# Patient Record
Sex: MALE | Race: WHITE | Employment: OTHER | URBAN - METROPOLITAN AREA
[De-identification: names, ages, dates, MRNs, and addresses within clinical notes are randomized per-mention and may not be internally consistent; named-entity substitution may affect disease eponyms.]

---

## 2018-01-22 ENCOUNTER — TELEPHONE (OUTPATIENT)
Dept: INTERNAL MEDICINE CLINIC | Age: 63
End: 2018-01-22

## 2018-01-25 ENCOUNTER — OFFICE VISIT (OUTPATIENT)
Dept: INTERNAL MEDICINE CLINIC | Age: 63
End: 2018-01-25

## 2018-01-25 VITALS
OXYGEN SATURATION: 97 % | TEMPERATURE: 97.8 F | HEIGHT: 71 IN | HEART RATE: 75 BPM | BODY MASS INDEX: 40.38 KG/M2 | RESPIRATION RATE: 16 BRPM | SYSTOLIC BLOOD PRESSURE: 128 MMHG | DIASTOLIC BLOOD PRESSURE: 74 MMHG | WEIGHT: 288.4 LBS

## 2018-01-25 DIAGNOSIS — J40 BRONCHITIS: Primary | ICD-10-CM

## 2018-01-25 PROCEDURE — 99213 OFFICE O/P EST LOW 20 MIN: CPT | Performed by: INTERNAL MEDICINE

## 2018-01-25 RX ORDER — AZITHROMYCIN 250 MG/1
TABLET, FILM COATED ORAL
Qty: 1 PACKET | Refills: 0 | Status: SHIPPED | OUTPATIENT
Start: 2018-01-25 | End: 2018-02-04

## 2018-01-25 RX ORDER — LORATADINE 10 MG/1
10 TABLET ORAL DAILY
Qty: 30 TABLET | Refills: 0 | Status: SHIPPED | OUTPATIENT
Start: 2018-01-25 | End: 2021-04-08 | Stop reason: ALTCHOICE

## 2018-01-25 ASSESSMENT — PATIENT HEALTH QUESTIONNAIRE - PHQ9
SUM OF ALL RESPONSES TO PHQ9 QUESTIONS 1 & 2: 0
SUM OF ALL RESPONSES TO PHQ QUESTIONS 1-9: 0
1. LITTLE INTEREST OR PLEASURE IN DOING THINGS: 0
2. FEELING DOWN, DEPRESSED OR HOPELESS: 0

## 2018-01-25 NOTE — PROGRESS NOTES
negative    Objective  Physical Examination:    Nursing note reviewed    /74 (Site: Right Arm, Position: Sitting, Cuff Size: Large Adult)   Pulse 75   Temp 97.8 °F (36.6 °C) (Oral)   Resp 16   Ht 5' 10.98\" (1.803 m)   Wt 288 lb 6.4 oz (130.8 kg)   SpO2 97%   BMI 40.24 kg/m²   BP Readings from Last 3 Encounters:   01/25/18 128/74   11/04/13 140/84         Constitutional:  Stephanie Link is oriented to place, person and time ,appears well-developed and well-nourished  HEENT:  Atraumatic and normocephalic, external ears normal bilaterally, nose normal no oropharyngeal exudate and is clear and moist  Eyes:  EOCM normal; conjunctivae normal; PERRLA bilaterally  Neck:  Normal range of motion, neck supple, no JVD and no thyromegaly  Cardiovascular:  RRR, normal heart sounds and intact distal pulses  Pulmonary:  effort normal and breath sounds normal bilaterally,no wheezes or rales, no respiratory distress  Abdominal:  Soft, non-tender; normal bowel sounds, no masses  Musculoskeletal:  Normal range of motion and no edema or tenderness bilaterally  No lymphadenopathy  Neurological:  alert, oriented, and normal reflexes bilaterally  Skin: warm and dry  Psychiatric:  normal mood and effect; behavior normal.    Labs:   No results found for: LABA1C  No results found for: CHOL  No results found for: HDL  No results found for: LDLCALC  No results found for: TRIG  No components found for: CHOLHDL  No results found for: WBC, HGB, HCT, MCV, PLT  No results found for: INR, PROTIME  No results found for: GLUCOSE, CREATININE, BUN, NA, K, CL, CO2  No results found for: ALT, AST, GGT, ALKPHOS, BILITOT  No results found for: LABPROT, LABALBU  No results found for: TSH, CBC  Assessment:  1. Bronchitis        Plan:  Z-Stuart for 5 days  Claritin 10 mg daily  Call back next week if no improvement           1. Stephanie Likn received counseling on the following healthy behaviors: nutrition and exercise    2.  Prior labs and health maintenance reviewed. 3.  Discussed use, benefit, and side effects of prescribed medications. Barriers to medication compliance addressed. All his questions were answered. Pt voiced understanding. Jaci Cates will continue current medications, diet and exercise. No orders of the defined types were placed in this encounter. Completed Refills               Requested Prescriptions      No prescriptions requested or ordered in this encounter     4. Patient given educational materials - see patient instructions    5. Was a self-tracking handout given in paper form or via [employer]? NO    No orders of the defined types were placed in this encounter. No Follow-up on file. Patient voiced understanding and agreed to treatment plan. Electronically signed by Luba Novak MD on 1/25/2018 at 12:01 PM    This note is created with a voice recognition program and while intend to generate a document that accurately reflects the content of the visit, no guarantee can be provided that every mistake has been identified and corrected by editing.

## 2018-02-07 ENCOUNTER — TELEPHONE (OUTPATIENT)
Dept: INTERNAL MEDICINE CLINIC | Age: 63
End: 2018-02-07

## 2018-02-07 DIAGNOSIS — N39.0 URINARY TRACT INFECTION WITHOUT HEMATURIA, SITE UNSPECIFIED: Primary | ICD-10-CM

## 2018-02-07 RX ORDER — LEVOFLOXACIN 500 MG/1
500 TABLET, FILM COATED ORAL DAILY
Qty: 10 TABLET | Refills: 0 | Status: SHIPPED | OUTPATIENT
Start: 2018-02-07 | End: 2018-02-17

## 2018-02-07 NOTE — TELEPHONE ENCOUNTER
Next Visit Date:  No future appointments. Health Maintenance   Topic Date Due    DTaP/Tdap/Td vaccine (1 - Tdap) 11/02/1975    Lipid screen  11/02/1996    Diabetes screen  11/02/1996    Colon cancer screen colonoscopy  11/02/2006    Flu vaccine (1) 09/01/2017    HIV screen  02/22/2018 (Originally 11/2/1970)    Zostavax vaccine  03/21/2018 (Originally 11/2/2016)    Hepatitis C screen  03/22/2018 (Originally 1955)       No results found for: LABA1C          ( goal A1C is < 7)   No results found for: LABMICR  No results found for: LDLCHOLESTEROL, LDLCALC    (goal LDL is <100)   No results found for: AST, ALT, BUN  BP Readings from Last 3 Encounters:   01/25/18 128/74   11/04/13 140/84          (goal 120/80)    All Future Testing planned in [facility]              There is no problem list on file for this patient.

## 2021-04-08 ENCOUNTER — HOSPITAL ENCOUNTER (OUTPATIENT)
Age: 66
Setting detail: SPECIMEN
Discharge: HOME | End: 2021-04-08
Payer: COMMERCIAL

## 2021-04-08 ENCOUNTER — OFFICE VISIT (OUTPATIENT)
Dept: INTERNAL MEDICINE CLINIC | Age: 66
End: 2021-04-08
Payer: COMMERCIAL

## 2021-04-08 VITALS
SYSTOLIC BLOOD PRESSURE: 156 MMHG | BODY MASS INDEX: 43.4 KG/M2 | DIASTOLIC BLOOD PRESSURE: 84 MMHG | WEIGHT: 310 LBS | TEMPERATURE: 98.3 F | RESPIRATION RATE: 18 BRPM | HEART RATE: 80 BPM | HEIGHT: 71 IN

## 2021-04-08 DIAGNOSIS — G89.29 CHRONIC PAIN OF BOTH KNEES: ICD-10-CM

## 2021-04-08 DIAGNOSIS — R60.0 LOWER EXTREMITY EDEMA: ICD-10-CM

## 2021-04-08 DIAGNOSIS — I10 ESSENTIAL HYPERTENSION: Primary | ICD-10-CM

## 2021-04-08 DIAGNOSIS — E66.01 MORBID OBESITY WITH BMI OF 40.0-44.9, ADULT (HCC): ICD-10-CM

## 2021-04-08 DIAGNOSIS — M25.562 CHRONIC PAIN OF BOTH KNEES: ICD-10-CM

## 2021-04-08 DIAGNOSIS — M25.561 CHRONIC PAIN OF BOTH KNEES: ICD-10-CM

## 2021-04-08 DIAGNOSIS — I10 ESSENTIAL HYPERTENSION: ICD-10-CM

## 2021-04-08 LAB
ALBUMIN SERPL-MCNC: 4.4 G/DL (ref 3.5–5.2)
ALBUMIN/GLOBULIN RATIO: 1.6 (ref 1–2.5)
ALP BLD-CCNC: 109 U/L (ref 40–129)
ALT SERPL-CCNC: 28 U/L (ref 5–41)
ANION GAP SERPL CALCULATED.3IONS-SCNC: 9 MMOL/L (ref 9–17)
AST SERPL-CCNC: 28 U/L
BILIRUB SERPL-MCNC: 0.43 MG/DL (ref 0.3–1.2)
BUN BLDV-MCNC: 10 MG/DL (ref 8–23)
BUN/CREAT BLD: NORMAL (ref 9–20)
CALCIUM SERPL-MCNC: 9 MG/DL (ref 8.6–10.4)
CHLORIDE BLD-SCNC: 104 MMOL/L (ref 98–107)
CHOLESTEROL/HDL RATIO: 7.4
CHOLESTEROL: 216 MG/DL
CO2: 26 MMOL/L (ref 20–31)
CREAT SERPL-MCNC: 0.8 MG/DL (ref 0.7–1.2)
GFR AFRICAN AMERICAN: >60 ML/MIN
GFR NON-AFRICAN AMERICAN: >60 ML/MIN
GFR SERPL CREATININE-BSD FRML MDRD: NORMAL ML/MIN/{1.73_M2}
GFR SERPL CREATININE-BSD FRML MDRD: NORMAL ML/MIN/{1.73_M2}
GLUCOSE BLD-MCNC: 95 MG/DL (ref 70–99)
HCT VFR BLD CALC: 49.6 % (ref 40.7–50.3)
HDLC SERPL-MCNC: 29 MG/DL
HEMOGLOBIN: 16.2 G/DL (ref 13–17)
LDL CHOLESTEROL DIRECT: 41 MG/DL
LDL CHOLESTEROL: ABNORMAL MG/DL (ref 0–130)
MAGNESIUM: 2.5 MG/DL (ref 1.6–2.6)
MCH RBC QN AUTO: 29.8 PG (ref 25.2–33.5)
MCHC RBC AUTO-ENTMCNC: 32.7 G/DL (ref 28.4–34.8)
MCV RBC AUTO: 91.3 FL (ref 82.6–102.9)
NRBC AUTOMATED: 0 PER 100 WBC
PDW BLD-RTO: 12.3 % (ref 11.8–14.4)
PLATELET # BLD: 233 K/UL (ref 138–453)
PMV BLD AUTO: 11.5 FL (ref 8.1–13.5)
POTASSIUM SERPL-SCNC: 3.9 MMOL/L (ref 3.7–5.3)
PROSTATE SPECIFIC ANTIGEN: 4.52 UG/L
RBC # BLD: 5.43 M/UL (ref 4.21–5.77)
SODIUM BLD-SCNC: 139 MMOL/L (ref 135–144)
TOTAL PROTEIN: 7.2 G/DL (ref 6.4–8.3)
TRIGL SERPL-MCNC: 810 MG/DL
TSH SERPL DL<=0.05 MIU/L-ACNC: 1.15 MIU/L (ref 0.3–5)
VITAMIN B-12: 326 PG/ML (ref 232–1245)
VLDLC SERPL CALC-MCNC: ABNORMAL MG/DL (ref 1–30)
WBC # BLD: 7.4 K/UL (ref 3.5–11.3)

## 2021-04-08 PROCEDURE — 99215 OFFICE O/P EST HI 40 MIN: CPT | Performed by: INTERNAL MEDICINE

## 2021-04-08 RX ORDER — HYDROCHLOROTHIAZIDE 25 MG/1
25 TABLET ORAL EVERY MORNING
Qty: 30 TABLET | Refills: 0 | Status: SHIPPED | OUTPATIENT
Start: 2021-04-08 | End: 2021-05-05

## 2021-04-08 SDOH — ECONOMIC STABILITY: FOOD INSECURITY: WITHIN THE PAST 12 MONTHS, YOU WORRIED THAT YOUR FOOD WOULD RUN OUT BEFORE YOU GOT MONEY TO BUY MORE.: NEVER TRUE

## 2021-04-08 SDOH — ECONOMIC STABILITY: INCOME INSECURITY: HOW HARD IS IT FOR YOU TO PAY FOR THE VERY BASICS LIKE FOOD, HOUSING, MEDICAL CARE, AND HEATING?: NOT HARD AT ALL

## 2021-04-08 SDOH — ECONOMIC STABILITY: FOOD INSECURITY: WITHIN THE PAST 12 MONTHS, THE FOOD YOU BOUGHT JUST DIDN'T LAST AND YOU DIDN'T HAVE MONEY TO GET MORE.: NEVER TRUE

## 2021-04-08 ASSESSMENT — PATIENT HEALTH QUESTIONNAIRE - PHQ9
SUM OF ALL RESPONSES TO PHQ QUESTIONS 1-9: 0
1. LITTLE INTEREST OR PLEASURE IN DOING THINGS: 0
SUM OF ALL RESPONSES TO PHQ QUESTIONS 1-9: 0

## 2021-04-08 NOTE — PROGRESS NOTES
Judah Wilkerson is a 60 y.o. male who presents for   Chief Complaint   Patient presents with    Knee Pain     bilat knee pain    Back Pain    Other     co legs feeling very heavy    and follow up of chronic medical problems. There is no problem list on file for this patient. HPI  Here to reestablish patient in complaining of leg swelling and gained weight and gained about 22 pounds since the last visit in 2018 and complains of legs feeling heavy otherwise denies any new complaints    Current Outpatient Medications   Medication Sig Dispense Refill    hydroCHLOROthiazide (HYDRODIURIL) 25 MG tablet Take 1 tablet by mouth every morning 30 tablet 0    Cyanocobalamin (VITAMIN B 12 PO) Take 1 tablet by mouth      VITAMIN D-3 (COLECALCIFEROL) 400 UNITS TABS Take  by mouth daily. No current facility-administered medications for this visit. Allergies   Allergen Reactions    Pcn [Penicillins]        No past medical history on file. No past surgical history on file. No family history on file.   ROS   Constitutional:  Negative for fatigue, loss of appetite and unexpected weight change   HEENT            : Negative for neck stiffness and pain, no congestion or sinus pressure   Eyes                : No visual disturbance or pain   Cardiovascular: No chest pain or palpitations or leg swelling   Respiratory      : Negative for cough, shortness of breath or wheezing   Gastrointestinal: Negative for abdominal pain, constipation or diarrhea and bloating No nausea or vomiting   Genitourinary:     No urgency or frequency, no burning or hematuria   Musculoskeletal: No arthralgias, back pain or myalgias   Skin                  : Negative for rash or erythema   Neurological    : Negative for dizziness, weakness, tremors ,light headedness or syncope   Psychiatric       : Negative for dysphoric mood, sleep disturbances, nervous or anxious, or decreased concentration   All other review of systems was negative    Objective  Physical Examination:    Nursing note reviewed    BP (!) 156/84   Pulse 80   Temp 98.3 °F (36.8 °C) (Infrared)   Resp 18   Ht 5' 11\" (1.803 m)   Wt (!) 310 lb (140.6 kg)   BMI 43.24 kg/m²   BP Readings from Last 3 Encounters:   04/08/21 (!) 156/84   01/25/18 128/74   11/04/13 140/84         Constitutional:  Jenna Pritchett is oriented to place, person and time ,appears well-developed and well-nourished  HEENT:  Atraumatic and normocephalic, external ears normal bilaterally, nose normal no oropharyngeal exudate and is clear and moist  Eyes:  EOCM normal; conjunctivae normal; PERRLA bilaterally  Neck:  Normal range of motion, neck supple, no JVD and no thyromegaly  Cardiovascular:  RRR, normal heart sounds and intact distal pulses  Pulmonary:  effort normal and breath sounds normal bilaterally,no wheezes or rales, no respiratory distress  Abdominal:  Soft, non-tender; normal bowel sounds, no masses  Musculoskeletal:  Normal range of motion and 1+ edema edema or tenderness bilaterally  No lymphadenopathy  Neurological:  alert, oriented, and normal reflexes bilaterally  Skin: warm and dry  Psychiatric:  normal mood and effect; behavior normal.    Labs:   No results found for: LABA1C  No results found for: CHOL  No results found for: HDL  No results found for: LDLCALC  No results found for: TRIG  No components found for: CHOLHDL  No results found for: WBC, HGB, HCT, MCV, PLT  No results found for: INR, PROTIME  No results found for: GLUCOSE, CREATININE, BUN, NA, K, CL, CO2  No results found for: ALT, AST, GGT, ALKPHOS, BILITOT  No results found for: LABPROT, LABALBU  No results found for: TSH, CBC  Assessment:  1. Essential hypertension    2. Lower extremity edema    3. Chronic pain of both knees    4.  Morbid obesity with BMI of 40.0-44.9, adult ([facility] [address]        Plan:  Patient had elevated blood pressure and also 1+ edema in the legs and so patient is started on hydrochlorothiazide 25 mg daily and advised to monitor blood pressure  Labs ordered to check for CBC CMP TSH and FLP  Bilateral knee x-rays ordered as patient complains of pain  Strongly counseled about diet exercise and weight loss  Review in 3 weeks           1. Raheem Cevallos received counseling on the following healthy behaviors: nutrition and exercise    2. Prior labs and health maintenance reviewed. 3.  Discussed use, benefit, and side effects of prescribed medications. Barriers to medication compliance addressed. All his questions were answered. Pt voiced understanding. Raheem Cevallos will continue current medications, diet and exercise. Orders Placed This Encounter   Medications    hydroCHLOROthiazide (HYDRODIURIL) 25 MG tablet     Sig: Take 1 tablet by mouth every morning     Dispense:  30 tablet     Refill:  0          Completed Refills               Requested Prescriptions     Signed Prescriptions Disp Refills    hydroCHLOROthiazide (HYDRODIURIL) 25 MG tablet 30 tablet 0     Sig: Take 1 tablet by mouth every morning     4. Patient given educational materials - see patient instructions    5. Was a self-tracking handout given in paper form or via [employer]?   NO    Orders Placed This Encounter   Procedures    XR Knee Bilateral 3 VW     Standing Status:   Future     Standing Expiration Date:   4/8/2022    Comprehensive Metabolic Panel     Standing Status:   Future     Standing Expiration Date:   4/8/2022    Lipid Panel     Standing Status:   Future     Standing Expiration Date:   4/8/2022     Order Specific Question:   Is Patient Fasting?/# of Hours     Answer:   12    TSH without Reflex     Standing Status:   Future     Standing Expiration Date:   4/8/2022    CBC     Standing Status:   Future     Standing Expiration Date:   4/8/2022    PSA screening     Standing Status:   Future     Standing Expiration Date:   4/8/2022    Magnesium     Standing Status:   Future     Standing Expiration Date:   4/8/2022    Vitamin B12 Standing Status:   Future     Standing Expiration Date:   4/8/2022     Return in about 3 weeks (around 4/29/2021). Patient voiced understanding and agreed to treatment plan. Electronically signed by Nigel Lindquist MD on 4/8/2021 at 3:15 PM    This note is created with a voice recognition program and while intend to generate a document that accurately reflects the content of the visit, no guarantee can be provided that every mistake has been identified and corrected by editing.

## 2021-04-13 ENCOUNTER — TELEPHONE (OUTPATIENT)
Dept: INTERNAL MEDICINE CLINIC | Age: 66
End: 2021-04-13

## 2021-04-13 DIAGNOSIS — R97.20 ELEVATED PSA: Primary | ICD-10-CM

## 2021-04-13 DIAGNOSIS — I10 ESSENTIAL HYPERTENSION: ICD-10-CM

## 2021-04-13 RX ORDER — FENOFIBRATE 145 MG/1
145 TABLET, COATED ORAL DAILY
Qty: 30 TABLET | Refills: 3 | Status: SHIPPED | OUTPATIENT
Start: 2021-04-13 | End: 2021-06-29 | Stop reason: SDUPTHER

## 2021-04-13 NOTE — TELEPHONE ENCOUNTER
----- Message from Steph Tamayo MD sent at 4/12/2021  1:13 PM EDT -----  Patient's PSA is elevated and advise to see urology and please make a referral  Also start on fenofibrate 145 mg daily for high triglycerides

## 2021-04-29 ENCOUNTER — OFFICE VISIT (OUTPATIENT)
Dept: INTERNAL MEDICINE CLINIC | Age: 66
End: 2021-04-29
Payer: COMMERCIAL

## 2021-04-29 VITALS
SYSTOLIC BLOOD PRESSURE: 134 MMHG | WEIGHT: 310 LBS | HEIGHT: 71 IN | TEMPERATURE: 98.2 F | BODY MASS INDEX: 43.4 KG/M2 | HEART RATE: 92 BPM | RESPIRATION RATE: 16 BRPM | DIASTOLIC BLOOD PRESSURE: 72 MMHG

## 2021-04-29 DIAGNOSIS — K92.89 GAS BLOAT SYNDROME: ICD-10-CM

## 2021-04-29 DIAGNOSIS — R60.0 LOWER EXTREMITY EDEMA: ICD-10-CM

## 2021-04-29 DIAGNOSIS — I10 ESSENTIAL HYPERTENSION: Primary | ICD-10-CM

## 2021-04-29 DIAGNOSIS — R97.20 ELEVATED PSA: ICD-10-CM

## 2021-04-29 PROCEDURE — 99214 OFFICE O/P EST MOD 30 MIN: CPT | Performed by: INTERNAL MEDICINE

## 2021-04-29 NOTE — PROGRESS NOTES
Morelia Gonzalez is a 60 y.o. male who presents for   Chief Complaint   Patient presents with    Hypertension     3 week check up, did not get home machine yet- labs 4/8/21   Eunice Krishnan     said new meds causing alot of gas    Leg Swelling     swelling of legs much better, feet and toes feel better    and follow up of chronic medical problems. There is no problem list on file for this patient. HPI  Here for follow-up on blood pressure and leg swelling and patient feeling better except developed gas and bloating after starting the medications    Current Outpatient Medications   Medication Sig Dispense Refill    fenofibrate (TRICOR) 145 MG tablet Take 1 tablet by mouth daily 30 tablet 3    Blood Pressure Monitoring MISC Blood pressure cuff for home use 1 each 0    hydroCHLOROthiazide (HYDRODIURIL) 25 MG tablet Take 1 tablet by mouth every morning 30 tablet 0    Cyanocobalamin (VITAMIN B 12 PO) Take 1 tablet by mouth      VITAMIN D-3 (COLECALCIFEROL) 400 UNITS TABS Take  by mouth daily. No current facility-administered medications for this visit. Allergies   Allergen Reactions    Pcn [Penicillins]        No past medical history on file. No past surgical history on file. No family history on file.   ROS   Constitutional:  Negative for fatigue, loss of appetite and unexpected weight change   HEENT            : Negative for neck stiffness and pain, no congestion or sinus pressure   Eyes                : No visual disturbance or pain   Cardiovascular: No chest pain or palpitations or leg swelling   Respiratory      : Negative for cough, shortness of breath or wheezing   Gastrointestinal: Negative for abdominal pain, constipation or diarrhea but positive for bloating No nausea or vomiting   Genitourinary:     No urgency or frequency, no burning or hematuria   Musculoskeletal: No arthralgias, back pain or myalgias   Skin                  : Negative for rash or erythema   Neurological    : Negative for dizziness, weakness, tremors ,light headedness or syncope   Psychiatric       : Negative for dysphoric mood, sleep disturbances, nervous or anxious, or decreased concentration   All other review of systems was negative    Objective  Physical Examination:    Nursing note reviewed    /72 (Site: Right Upper Arm, Position: Sitting, Cuff Size: Large Adult)   Pulse 92   Temp 98.2 °F (36.8 °C) (Infrared)   Resp 16   Ht 5' 11\" (1.803 m)   Wt (!) 310 lb (140.6 kg)   BMI 43.24 kg/m²   BP Readings from Last 3 Encounters:   04/29/21 134/72   04/08/21 (!) 156/84   01/25/18 128/74         Constitutional:  Mai Silva is oriented to place, person and time ,appears well-developed and well-nourished  HEENT:  Atraumatic and normocephalic, external ears normal bilaterally, nose normal no oropharyngeal exudate and is clear and moist  Eyes:  EOCM normal; conjunctivae normal; PERRLA bilaterally  Neck:  Normal range of motion, neck supple, no JVD and no thyromegaly  Cardiovascular:  RRR, normal heart sounds and intact distal pulses  Pulmonary:  effort normal and breath sounds normal bilaterally,no wheezes or rales, no respiratory distress  Abdominal:  Soft, non-tender; normal bowel sounds, no masses  Musculoskeletal:  Normal range of motion and no edema or tenderness bilaterally  No lymphadenopathy  Neurological:  alert, oriented, and normal reflexes bilaterally  Skin: warm and dry  Psychiatric:  normal mood and effect; behavior normal.    Labs:   No results found for: LABA1C  Lab Results   Component Value Date    CHOL 216 (H) 04/08/2021     Lab Results   Component Value Date    HDL 29 (L) 04/08/2021     No results found for: [address]  Lab Results   Component Value Date    TRIG 810 (H) 04/08/2021     No components found for: CHOLHDL  Lab Results   Component Value Date    WBC 7.4 04/08/2021    HGB 16.2 04/08/2021    HCT 49.6 04/08/2021    MCV 91.3 04/08/2021     04/08/2021     No results found for: INR, PROTIME Lab Results   Component Value Date    GLUCOSE 95 04/08/2021    CREATININE 0.80 04/08/2021    BUN 10 04/08/2021     04/08/2021    K 3.9 04/08/2021     04/08/2021    CO2 26 04/08/2021     Lab Results   Component Value Date    ALT 28 04/08/2021    AST 28 04/08/2021    ALKPHOS 109 04/08/2021    BILITOT 0.43 04/08/2021     Lab Results   Component Value Date    LABALBU 4.4 04/08/2021     Lab Results   Component Value Date    TSH 1.15 04/08/2021     Assessment:  1. Essential hypertension    2. Lower extremity edema    3. Elevated PSA    4. Gas bloat syndrome        Plan:  Patient blood pressure has improved and continue hydrochlorothiazide and swelling is resolved in the legs  As patient is complaining of bloating and gas after starting the medication side told him to hold the fenofibrate for 1 week to 10 days and call me back if bloating improved  or not  Patient has seen the urologist for elevated PSA and scheduling biopsy on May 20  Review in 3 months           1. Stella Cardona received counseling on the following healthy behaviors: nutrition and exercise    2. Prior labs and health maintenance reviewed. 3.  Discussed use, benefit, and side effects of prescribed medications. Barriers to medication compliance addressed. All his questions were answered. Pt voiced understanding. Stella Cardona will continue current medications, diet and exercise. No orders of the defined types were placed in this encounter. Completed Refills               Requested Prescriptions      No prescriptions requested or ordered in this encounter     4. Patient given educational materials - see patient instructions    5. Was a self-tracking handout given in paper form or via [employer]? NO    No orders of the defined types were placed in this encounter. Return in about 3 months (around 7/29/2021). Patient voiced understanding and agreed to treatment plan.      Electronically signed by Clyde Astudillo MD on 4/29/2021 at 3:01 PM    This note is created with a voice recognition program and while intend to generate a document that accurately reflects the content of the visit, no guarantee can be provided that every mistake has been identified and corrected by editing.

## 2021-05-05 RX ORDER — HYDROCHLOROTHIAZIDE 25 MG/1
25 TABLET ORAL EVERY MORNING
Qty: 30 TABLET | Refills: 1 | Status: SHIPPED | OUTPATIENT
Start: 2021-05-05 | End: 2021-06-29

## 2021-05-05 NOTE — TELEPHONE ENCOUNTER
Farideh Herndon is calling to request a refill on the following medication(s):    Medication Request:  Requested Prescriptions     Pending Prescriptions Disp Refills    hydroCHLOROthiazide (HYDRODIURIL) 25 MG tablet [Pharmacy Med Name: HYDROCHLOROTHIAZIDE 25MG TABLETS] 30 tablet 0     Sig: TAKE 1 TABLET BY MOUTH EVERY MORNING     Last fill 4/8/21  Last Visit Date (If Applicable):  6/52/8289    Next Visit Date:    7/29/2021

## 2021-05-11 ENCOUNTER — TELEPHONE (OUTPATIENT)
Dept: INTERNAL MEDICINE CLINIC | Age: 66
End: 2021-05-11

## 2021-05-29 ENCOUNTER — HOSPITAL ENCOUNTER (OUTPATIENT)
Dept: LAB | Age: 66
Setting detail: SPECIMEN
Discharge: HOME | End: 2021-05-29
Payer: COMMERCIAL

## 2021-05-29 DIAGNOSIS — Z01.818 PREOP TESTING: Primary | ICD-10-CM

## 2021-05-29 PROCEDURE — U0005 INFEC AGEN DETEC AMPLI PROBE: HCPCS

## 2021-05-29 PROCEDURE — U0003 INFECTIOUS AGENT DETECTION BY NUCLEIC ACID (DNA OR RNA); SEVERE ACUTE RESPIRATORY SYNDROME CORONAVIRUS 2 (SARS-COV-2) (CORONAVIRUS DISEASE [COVID-19]), AMPLIFIED PROBE TECHNIQUE, MAKING USE OF HIGH THROUGHPUT TECHNOLOGIES AS DESCRIBED BY CMS-2020-01-R: HCPCS

## 2021-05-30 LAB
SARS-COV-2: NORMAL
SARS-COV-2: NOT DETECTED
SOURCE: NORMAL

## 2021-06-02 ENCOUNTER — HOSPITAL ENCOUNTER (OUTPATIENT)
Dept: ULTRASOUND IMAGING | Age: 66
Discharge: HOME | End: 2021-06-04
Attending: UROLOGY
Payer: COMMERCIAL

## 2021-06-02 ENCOUNTER — HOSPITAL ENCOUNTER (OUTPATIENT)
Age: 66
Setting detail: OUTPATIENT SURGERY
Discharge: HOME | End: 2021-06-02
Attending: UROLOGY | Admitting: UROLOGY
Payer: COMMERCIAL

## 2021-06-02 VITALS
HEIGHT: 71 IN | DIASTOLIC BLOOD PRESSURE: 91 MMHG | WEIGHT: 295 LBS | BODY MASS INDEX: 41.3 KG/M2 | SYSTOLIC BLOOD PRESSURE: 181 MMHG | RESPIRATION RATE: 18 BRPM | HEART RATE: 85 BPM | TEMPERATURE: 96.8 F | OXYGEN SATURATION: 95 %

## 2021-06-02 PROCEDURE — 2500000003 HC RX 250 WO HCPCS: Performed by: UROLOGY

## 2021-06-02 PROCEDURE — 88305 TISSUE EXAM BY PATHOLOGIST: CPT

## 2021-06-02 PROCEDURE — 88344 IMHCHEM/IMCYTCHM EA MLT ANTB: CPT

## 2021-06-02 PROCEDURE — 3600000012 HC SURGERY LEVEL 2 ADDTL 15MIN: Performed by: UROLOGY

## 2021-06-02 PROCEDURE — 7100000040 HC SPAR PHASE II RECOVERY - FIRST 15 MIN: Performed by: UROLOGY

## 2021-06-02 PROCEDURE — 76942 ECHO GUIDE FOR BIOPSY: CPT

## 2021-06-02 PROCEDURE — 2709999900 HC NON-CHARGEABLE SUPPLY: Performed by: UROLOGY

## 2021-06-02 PROCEDURE — 6370000000 HC RX 637 (ALT 250 FOR IP): Performed by: UROLOGY

## 2021-06-02 PROCEDURE — 3600000002 HC SURGERY LEVEL 2 BASE: Performed by: UROLOGY

## 2021-06-02 RX ORDER — LIDOCAINE HYDROCHLORIDE 20 MG/ML
JELLY TOPICAL PRN
Status: DISCONTINUED | OUTPATIENT
Start: 2021-06-02 | End: 2021-06-02 | Stop reason: ALTCHOICE

## 2021-06-02 RX ORDER — CIPROFLOXACIN 500 MG/1
500 TABLET, FILM COATED ORAL 2 TIMES DAILY
COMMUNITY
End: 2021-07-29

## 2021-06-02 RX ORDER — LIDOCAINE HYDROCHLORIDE 10 MG/ML
INJECTION, SOLUTION EPIDURAL; INFILTRATION; INTRACAUDAL; PERINEURAL PRN
Status: DISCONTINUED | OUTPATIENT
Start: 2021-06-02 | End: 2021-06-02 | Stop reason: ALTCHOICE

## 2021-06-02 ASSESSMENT — PAIN SCALES - GENERAL: PAINLEVEL_OUTOF10: 0

## 2021-06-02 ASSESSMENT — PAIN - FUNCTIONAL ASSESSMENT: PAIN_FUNCTIONAL_ASSESSMENT: 0-10

## 2021-06-02 ASSESSMENT — PAIN DESCRIPTION - PAIN TYPE: TYPE: SURGICAL PAIN

## 2021-06-02 NOTE — PROGRESS NOTES
Pt. Drove self here, nobody to be with at home.   Urology PA here to talk with with about going as local.

## 2021-06-02 NOTE — OP NOTE
Dr. Patito Mendoza MD      [address] [facility]. [place]  06/02/21    Patient:  Radha Morse  MRN: [id number]  YOB: 1955    Surgeon: Dr. Patito Mendoza MD  Assistant: Lynn Rodriguez MD    Pre-op Diagnosis: Elevated PSA. Post-op Diagnosis: Elevated PSA. Procedure:   1. Trans-Rectal Ultrasound Guided Biopsy of the Prostate    Intraoperative Findings:   - Prostate Gland Size: 121cc  - Prostate Lesions: None    Anesthesia: Local  Complications: None  OR Blood Loss: Minimal  Fluids: Cystalloids  Specimens:  ID Type Source Tests Collected by Time Destination   A : PROSTATE BIOPSY X12 Tissue Prostate SURGICAL PATHOLOGY Chilo Rico MD 6/2/2021 1118        Indication:  60year old male with history of elevated PSA who presents for the prostate biopsy. Narrative of the Procedure:    After informed consent was obtain, the patient was taken to the operative suite. He remained on the [facility]. He was rolled onto the side. The legs were brought toward the chest. Lidocaine jelly was used in the rectum. A timeout occurred in which two patient identifiers were used. The rectal ultrasound probe was inserted and volumetric measurements were taken. The prostate volume was 121 grams. The prostate was assessed in its entirety and no hypoechoic or otherwise abnormal lesions were noted. The bilateral neurovascular bundles were located and 1% lidocaine local anesthetic was injected bilaterally for local nerve blocks. Starting at the base of the gland and worked apically, sampling was completed. A standard sextant template was employed bilaterally. A total of 2 cores were taken within each quadrant for a total of 24 biopsies. Once completed, the rectal ultrasound probe was removed. Inspection of the rectum demonstrated no active bleeding. The patient was rolled back into the supine position. He was then discharged back to the PACU in good and stable condition.      Follow-Up: The

## 2021-06-02 NOTE — H&P
Pre-op History and Physical  [address] PA-C    Patient:  Mallorie Win  MRN: [id number]  YOB: 1955    HISTORY OF PRESENT ILLNESS:     The patient is a 60 y.o. male who presents with elevated PSA. Here for prostate biopsy under mac. Patient's old records, notes and chart reviewed and summarized above. [address] Slater PA-C independently reviewed the images and verified the radiology reports from:    No results found. Past Medical History:    Past Medical History:   Diagnosis Date    Hyperlipidemia     Hypertension     PSA elevation        Past Surgical History:    History reviewed. No pertinent surgical history. Medications Prior to Admission:    Prior to Admission medications    Medication Sig Start Date End Date Taking? Authorizing Provider   ciprofloxacin (CIPRO) 500 MG tablet Take 500 mg by mouth 2 times daily   Yes Historical Provider, MD   VITAMIN E PO Take 1 tablet by mouth daily   Yes Historical Provider, MD   hydroCHLOROthiazide (HYDRODIURIL) 25 MG tablet TAKE 1 TABLET BY MOUTH EVERY MORNING 5/5/21  Yes Hank Anderson MD   fenofibrate (TRICOR) 145 MG tablet Take 1 tablet by mouth daily 4/13/21  Yes Hank Anderson MD   Cyanocobalamin (VITAMIN B 12 PO) Take 1 tablet by mouth   Yes Historical Provider, MD   VITAMIN D-3 (COLECALCIFEROL) 400 UNITS TABS Take  by mouth daily.    Yes Historical Provider, MD   Blood Pressure Monitoring MISC Blood pressure cuff for home use 4/13/21   Hank Anderson MD       Allergies:  Pcn [penicillins]    Social History:    Social History     Socioeconomic History    Marital status: Single     Spouse name: Not on file    Number of children: Not on file    Years of education: Not on file    Highest education level: Not on file   Occupational History    Not on file   Tobacco Use    Smoking status: Never Smoker    Smokeless tobacco: Never Used   Substance and Sexual Activity    Alcohol use: No    Drug use: Never    Sexual activity: Not on file   Other Topics Concern    Not on file   Social History Narrative    Not on file     Social Determinants of Health     Financial Resource Strain: Low Risk     Difficulty of Paying Living Expenses: Not hard at all   Food Insecurity: No Food Insecurity    Worried About Running Out of Food in the Last Year: Never true    [address] in the Last Year: Never true   Transportation Needs: No Transportation Needs    Lack of Transportation (Medical): No    Lack of Transportation (Non-Medical): No   Physical Activity:     Days of Exercise per Week:     Minutes of Exercise per Session:    Stress:     Feeling of Stress :    Social Connections:     Frequency of Communication with Friends and Family:     Frequency of Social Gatherings with Friends and Family:     Attends [employer] Services:     Active Member of Clubs or Organizations:     Attends Club or Organization Meetings:     Marital Status:    Intimate Partner Violence:     Fear of Current or Ex-Partner:     Emotionally Abused:     Physically Abused:     Sexually Abused:        Family History:  History reviewed. No pertinent family history. REVIEW OF SYSTEMS:  Constitutional: negative  Eyes: negative  Respiratory: negative  Cardiovascular: negative  Gastrointestinal: negative  Genitourinary: no acute issues  Musculoskeletal: negative  Skin: negative   Neurological: negative  Hematological/Lymphatic: negative  Psychological: negative    PHYSICAL EXAM:    Patient Vitals for the past 24 hrs:   BP Temp Temp src Pulse Resp SpO2 Height Weight   06/02/21 1002 (!) 160/92 97.2 °F (36.2 °C) Temporal 81 20 95 % 5' 11\" (1.803 m) 295 lb (133.8 kg)     Constitutional: Patient in NAD  Neuro: Alert and oriented to person, place, and time  Psych: Mood and affect normal  Skin: Clean, dry, intact   Lungs: Respiratory effort normal, CTA  Cardiovascular:  Normal peripheral pulses; no murmur.  Normal rhythm  Abdomen: Soft, non-tender,

## 2021-06-04 LAB — SURGICAL PATHOLOGY REPORT: NORMAL

## 2021-06-11 ENCOUNTER — OFFICE VISIT (OUTPATIENT)
Dept: INTERNAL MEDICINE CLINIC | Age: 66
End: 2021-06-11
Payer: COMMERCIAL

## 2021-06-11 VITALS
SYSTOLIC BLOOD PRESSURE: 142 MMHG | RESPIRATION RATE: 20 BRPM | DIASTOLIC BLOOD PRESSURE: 80 MMHG | HEART RATE: 80 BPM | HEIGHT: 71 IN | OXYGEN SATURATION: 98 % | BODY MASS INDEX: 41.47 KG/M2 | WEIGHT: 296.2 LBS | TEMPERATURE: 98.1 F

## 2021-06-11 DIAGNOSIS — R97.20 ELEVATED PSA: ICD-10-CM

## 2021-06-11 DIAGNOSIS — H53.9 DIFFICULTY READING DUE TO VISUAL PROBLEM: Primary | ICD-10-CM

## 2021-06-11 DIAGNOSIS — H43.392 FLOATERS IN VISUAL FIELD, LEFT: ICD-10-CM

## 2021-06-11 DIAGNOSIS — I10 ESSENTIAL HYPERTENSION: ICD-10-CM

## 2021-06-11 PROCEDURE — 99214 OFFICE O/P EST MOD 30 MIN: CPT | Performed by: INTERNAL MEDICINE

## 2021-06-11 ASSESSMENT — PATIENT HEALTH QUESTIONNAIRE - PHQ9
1. LITTLE INTEREST OR PLEASURE IN DOING THINGS: 0
SUM OF ALL RESPONSES TO PHQ QUESTIONS 1-9: 0
SUM OF ALL RESPONSES TO PHQ QUESTIONS 1-9: 0
SUM OF ALL RESPONSES TO PHQ9 QUESTIONS 1 & 2: 0
2. FEELING DOWN, DEPRESSED OR HOPELESS: 0
SUM OF ALL RESPONSES TO PHQ QUESTIONS 1-9: 0

## 2021-06-11 NOTE — PROGRESS NOTES
Negative for cough, shortness of breath or wheezing   Gastrointestinal: Negative for abdominal pain, constipation or diarrhea and bloating No nausea or vomiting   Genitourinary:     No urgency or frequency, no burning or hematuria   Musculoskeletal: No arthralgias, back pain or myalgias   Skin                  : Negative for rash or erythema   Neurological    : Negative for dizziness, weakness, tremors ,light headedness or syncope   Psychiatric       : Negative for dysphoric mood, sleep disturbances, nervous or anxious, or decreased concentration   All other review of systems was negative    Objective  Physical Examination:    Nursing note reviewed    BP (!) 142/80 (Site: Right Upper Arm, Position: Sitting, Cuff Size: Large Adult)   Pulse 80   Temp 98.1 °F (36.7 °C) (Temporal)   Resp 20   Ht 5' 10.98\" (1.803 m)   Wt 296 lb 3.2 oz (134.4 kg)   SpO2 98%   BMI 41.33 kg/m²   BP Readings from Last 3 Encounters:   06/11/21 (!) 142/80   06/02/21 (!) 181/91   04/29/21 134/72         Constitutional:  YOUSUF is oriented to place, person and time ,appears well-developed and well-nourished  HEENT:  Atraumatic and normocephalic, external ears normal bilaterally, nose normal no oropharyngeal exudate and is clear and moist  Eyes:  EOCM normal; conjunctivae normal; PERRLA bilaterally  Neck:  Normal range of motion, neck supple, no JVD and no thyromegaly  Cardiovascular:  RRR, normal heart sounds and intact distal pulses  Pulmonary:  effort normal and breath sounds normal bilaterally,no wheezes or rales, no respiratory distress  Abdominal:  Soft, non-tender; normal bowel sounds, no masses  Musculoskeletal:  Normal range of motion and no edema or tenderness bilaterally  No lymphadenopathy  Neurological:  alert, oriented, and normal reflexes bilaterally  Skin: warm and dry  Psychiatric:  normal mood and effect; behavior normal.    Labs:   No results found for: LABA1C  Lab Results   Component Value Date    CHOL 216 (H) 04/08/2021     Lab Results   Component Value Date    HDL 29 (L) 04/08/2021     No results found for: [address]  Lab Results   Component Value Date    TRIG 810 (H) 04/08/2021     No components found for: CHOLHDL  Lab Results   Component Value Date    WBC 7.4 04/08/2021    HGB 16.2 04/08/2021    HCT 49.6 04/08/2021    MCV 91.3 04/08/2021     04/08/2021     No results found for: INR, PROTIME  Lab Results   Component Value Date    GLUCOSE 95 04/08/2021    CREATININE 0.80 04/08/2021    BUN 10 04/08/2021     04/08/2021    K 3.9 04/08/2021     04/08/2021    CO2 26 04/08/2021     Lab Results   Component Value Date    ALT 28 04/08/2021    AST 28 04/08/2021    ALKPHOS 109 04/08/2021    BILITOT 0.43 04/08/2021     Lab Results   Component Value Date    LABALBU 4.4 04/08/2021     Lab Results   Component Value Date    TSH 1.15 04/08/2021     Assessment:   Diagnosis Orders   1. Difficulty reading due to visual problem     2. Essential hypertension     3. Elevated PSA     4. Floaters in visual field, left           Plan:  Patient blood pressure is borderline elevated and patient states his blood pressure is doing good and advised him to continue current treatment and monitor blood pressure  Patient had problems with his vision in the left eye which is getting worse over the last few months and advised patient to see the eye doctor and make an appointment and advised him to go to the ER if any worse  Patient had a prostate biopsy which is reviewed and showed one area where there is some atypical proliferation of the cells and advised patient to follow with urology  Review as scheduled           1. Inderjit Maldonado received counseling on the following healthy behaviors: nutrition and exercise    2. Prior labs and health maintenance reviewed. 3.  Discussed use, benefit, and side effects of prescribed medications. Barriers to medication compliance addressed. All his questions were answered. Pt voiced understanding. Margarita Avila will continue current medications, diet and exercise. No orders of the defined types were placed in this encounter. Completed Refills               Requested Prescriptions      No prescriptions requested or ordered in this encounter     4. Patient given educational materials - see patient instructions    5. Was a self-tracking handout given in paper form or via [employer]? NO    No orders of the defined types were placed in this encounter. No follow-ups on file. Patient voiced understanding and agreed to treatment plan. Electronically signed by Wendy Barger MD on 6/11/2021 at 11:08 AM    This note is created with a voice recognition program and while intend to generate a document that accurately reflects the content of the visit, no guarantee can be provided that every mistake has been identified and corrected by editing.

## 2021-06-28 NOTE — TELEPHONE ENCOUNTER
Erik Celeste is calling to request a refill on the following medication(s):    Medication Request:  Requested Prescriptions     Pending Prescriptions Disp Refills    hydroCHLOROthiazide (HYDRODIURIL) 25 MG tablet [Pharmacy Med Name: HYDROCHLOROTHIAZIDE 25MG TABLETS] 30 tablet 1     Sig: TAKE 1 TABLET BY MOUTH EVERY MORNING     Last fill 5/5/21  Last Visit Date (If Applicable):  1/80/9361    Next Visit Date:    7/29/2021

## 2021-06-29 RX ORDER — FENOFIBRATE 145 MG/1
145 TABLET, COATED ORAL DAILY
Qty: 30 TABLET | Refills: 3 | Status: SHIPPED | OUTPATIENT
Start: 2021-06-29 | End: 2021-08-20

## 2021-06-29 RX ORDER — HYDROCHLOROTHIAZIDE 25 MG/1
25 TABLET ORAL EVERY MORNING
Qty: 30 TABLET | Refills: 1 | Status: SHIPPED | OUTPATIENT
Start: 2021-06-29 | End: 2021-08-10

## 2021-06-29 NOTE — TELEPHONE ENCOUNTER
Ten Espinal is calling to request a refill on the following medication(s):    Medication Request:  Requested Prescriptions     Pending Prescriptions Disp Refills    fenofibrate (TRICOR) 145 MG tablet 30 tablet 3     Sig: Take 1 tablet by mouth daily       Last Visit Date (If Applicable):  1/14/9981    Next Visit Date:    7/29/2021

## 2021-07-29 ENCOUNTER — OFFICE VISIT (OUTPATIENT)
Dept: INTERNAL MEDICINE CLINIC | Age: 66
End: 2021-07-29
Payer: COMMERCIAL

## 2021-07-29 ENCOUNTER — HOSPITAL ENCOUNTER (OUTPATIENT)
Age: 66
Setting detail: SPECIMEN
Discharge: HOME | End: 2021-07-29
Payer: COMMERCIAL

## 2021-07-29 ENCOUNTER — TELEPHONE (OUTPATIENT)
Dept: INTERNAL MEDICINE CLINIC | Age: 66
End: 2021-07-29

## 2021-07-29 VITALS
HEART RATE: 76 BPM | RESPIRATION RATE: 18 BRPM | WEIGHT: 299 LBS | HEIGHT: 70 IN | DIASTOLIC BLOOD PRESSURE: 80 MMHG | BODY MASS INDEX: 42.8 KG/M2 | TEMPERATURE: 98.1 F | SYSTOLIC BLOOD PRESSURE: 138 MMHG

## 2021-07-29 DIAGNOSIS — K59.09 OTHER CONSTIPATION: ICD-10-CM

## 2021-07-29 DIAGNOSIS — R82.90 FOUL SMELLING URINE: ICD-10-CM

## 2021-07-29 DIAGNOSIS — K14.6 TONGUE BURNING SENSATION: ICD-10-CM

## 2021-07-29 DIAGNOSIS — N39.0 URINARY TRACT INFECTION WITHOUT HEMATURIA, SITE UNSPECIFIED: Primary | ICD-10-CM

## 2021-07-29 DIAGNOSIS — M79.10 MYALGIA: ICD-10-CM

## 2021-07-29 DIAGNOSIS — R15.1 FECAL SMEARING: ICD-10-CM

## 2021-07-29 DIAGNOSIS — K92.89 GAS BLOAT SYNDROME: ICD-10-CM

## 2021-07-29 DIAGNOSIS — K59.09 OTHER CONSTIPATION: Primary | ICD-10-CM

## 2021-07-29 LAB
-: ABNORMAL
ALBUMIN SERPL-MCNC: 3.9 G/DL (ref 3.5–5.2)
ALBUMIN/GLOBULIN RATIO: 1.3 (ref 1–2.5)
ALP BLD-CCNC: 81 U/L (ref 40–129)
ALT SERPL-CCNC: 24 U/L (ref 5–41)
AMORPHOUS: ABNORMAL
ANION GAP SERPL CALCULATED.3IONS-SCNC: 14 MMOL/L (ref 9–17)
AST SERPL-CCNC: 18 U/L
BACTERIA: ABNORMAL
BILIRUB SERPL-MCNC: 0.6 MG/DL (ref 0.3–1.2)
BILIRUBIN URINE: NEGATIVE
BUN BLDV-MCNC: 14 MG/DL (ref 8–23)
BUN/CREAT BLD: ABNORMAL (ref 9–20)
CALCIUM SERPL-MCNC: 9.1 MG/DL (ref 8.6–10.4)
CASTS UA: ABNORMAL /LPF (ref 0–8)
CHLORIDE BLD-SCNC: 104 MMOL/L (ref 98–107)
CO2: 24 MMOL/L (ref 20–31)
COLOR: YELLOW
COMMENT UA: ABNORMAL
CREAT SERPL-MCNC: 1.11 MG/DL (ref 0.7–1.2)
CRYSTALS, UA: ABNORMAL /HPF
EPITHELIAL CELLS UA: ABNORMAL /HPF (ref 0–5)
GFR AFRICAN AMERICAN: >60 ML/MIN
GFR NON-AFRICAN AMERICAN: >60 ML/MIN
GFR SERPL CREATININE-BSD FRML MDRD: ABNORMAL ML/MIN/{1.73_M2}
GFR SERPL CREATININE-BSD FRML MDRD: ABNORMAL ML/MIN/{1.73_M2}
GLUCOSE BLD-MCNC: 117 MG/DL (ref 70–99)
GLUCOSE URINE: NEGATIVE
HCT VFR BLD CALC: 46.9 % (ref 40.7–50.3)
HEMOGLOBIN: 14.8 G/DL (ref 13–17)
KETONES, URINE: NEGATIVE
LEUKOCYTE ESTERASE, URINE: ABNORMAL
MCH RBC QN AUTO: 29.5 PG (ref 25.2–33.5)
MCHC RBC AUTO-ENTMCNC: 31.6 G/DL (ref 28.4–34.8)
MCV RBC AUTO: 93.6 FL (ref 82.6–102.9)
MUCUS: ABNORMAL
NITRITE, URINE: POSITIVE
NRBC AUTOMATED: 0 PER 100 WBC
OTHER OBSERVATIONS UA: ABNORMAL
PDW BLD-RTO: 12.3 % (ref 11.8–14.4)
PH UA: 7.5 (ref 5–8)
PLATELET # BLD: 248 K/UL (ref 138–453)
PMV BLD AUTO: 11.2 FL (ref 8.1–13.5)
POTASSIUM SERPL-SCNC: 3.7 MMOL/L (ref 3.7–5.3)
PROTEIN UA: NEGATIVE
RBC # BLD: 5.01 M/UL (ref 4.21–5.77)
RBC UA: ABNORMAL /HPF (ref 0–4)
RENAL EPITHELIAL, UA: ABNORMAL /HPF
SODIUM BLD-SCNC: 142 MMOL/L (ref 135–144)
SPECIFIC GRAVITY UA: 1.02 (ref 1–1.03)
TOTAL CK: 77 U/L (ref 39–308)
TOTAL PROTEIN: 7 G/DL (ref 6.4–8.3)
TRICHOMONAS: ABNORMAL
TURBIDITY: CLEAR
URIC ACID: 5.2 MG/DL (ref 3.4–7)
URINE HGB: NEGATIVE
UROBILINOGEN, URINE: NORMAL
VITAMIN B-12: 305 PG/ML (ref 232–1245)
WBC # BLD: 6.9 K/UL (ref 3.5–11.3)
WBC UA: ABNORMAL /HPF (ref 0–5)
YEAST: ABNORMAL

## 2021-07-29 PROCEDURE — 99214 OFFICE O/P EST MOD 30 MIN: CPT | Performed by: INTERNAL MEDICINE

## 2021-07-29 RX ORDER — CIPROFLOXACIN 500 MG/1
500 TABLET, FILM COATED ORAL 2 TIMES DAILY
Qty: 14 TABLET | Refills: 0 | Status: SHIPPED | OUTPATIENT
Start: 2021-07-29 | End: 2021-08-05

## 2021-07-29 RX ORDER — POLYETHYLENE GLYCOL 3350 17 G/17G
17 POWDER, FOR SOLUTION ORAL DAILY PRN
Qty: 510 G | Refills: 0 | Status: SHIPPED | OUTPATIENT
Start: 2021-07-29 | End: 2021-08-28

## 2021-07-29 NOTE — PROGRESS NOTES
ULTRASOUND, OFFICE NOTIFYING ULTRASOUND performed by Helga Angelucci, MD at [place] [address]       No family history on file.   ROS   Constitutional:  Negative for fatigue, loss of appetite and unexpected weight change   HEENT            : Negative for neck stiffness and pain, no congestion or sinus pressure   Eyes                : No visual disturbance or pain   Cardiovascular: No chest pain or palpitations or leg swelling   Respiratory      : Negative for cough, shortness of breath or wheezing   Gastrointestinal: Negative for abdominal pain, positive for constipation but no diarrhea and positive for bloating No nausea or vomiting   Genitourinary:     No urgency or frequency, no burning or hematuria   Musculoskeletal: Positive for arthralgias, back pain or myalgias   Skin                  : Negative for rash or erythema   Neurological    : Negative for dizziness, weakness, tremors ,light headedness or syncope   Psychiatric       : Negative for dysphoric mood, sleep disturbances, nervous or anxious, or decreased concentration   All other review of systems was negative    Objective  Physical Examination:    Nursing note reviewed    /80   Pulse 76   Temp 98.1 °F (36.7 °C) (Infrared)   Resp 18   Ht 5' 10\" (1.778 m)   Wt 299 lb (135.6 kg)   BMI 42.90 kg/m²   BP Readings from Last 3 Encounters:   07/29/21 138/80   06/11/21 (!) 142/80   06/02/21 (!) 181/91         Constitutional:  Matt Redding is oriented to place, person and time ,appears well-developed and well-nourished  HEENT:  Atraumatic and normocephalic, external ears normal bilaterally, nose normal no oropharyngeal exudate and is clear and moist  Eyes:  EOCM normal; conjunctivae normal; PERRLA bilaterally  Neck:  Normal range of motion, neck supple, no JVD and no thyromegaly  Cardiovascular:  RRR, normal heart sounds and intact distal pulses  Pulmonary:  effort normal and breath sounds normal bilaterally,no wheezes or rales, no respiratory distress  Abdominal:  Soft, non-tender; normal bowel sounds, no masses  Musculoskeletal:  Normal range of motion and no edema or tenderness bilaterally  No lymphadenopathy  Neurological:  alert, oriented, and normal reflexes bilaterally  Skin: warm and dry  Psychiatric:  normal mood and effect; behavior normal.    Labs:   No results found for: LABA1C  Lab Results   Component Value Date    CHOL 216 (H) 04/08/2021     Lab Results   Component Value Date    HDL 29 (L) 04/08/2021     No results found for: [address]  Lab Results   Component Value Date    TRIG 810 (H) 04/08/2021     No components found for: CHOLHDL  Lab Results   Component Value Date    WBC 7.4 04/08/2021    HGB 16.2 04/08/2021    HCT 49.6 04/08/2021    MCV 91.3 04/08/2021     04/08/2021     No results found for: INR, PROTIME  Lab Results   Component Value Date    GLUCOSE 95 04/08/2021    CREATININE 0.80 04/08/2021    BUN 10 04/08/2021     04/08/2021    K 3.9 04/08/2021     04/08/2021    CO2 26 04/08/2021     Lab Results   Component Value Date    ALT 28 04/08/2021    AST 28 04/08/2021    ALKPHOS 109 04/08/2021    BILITOT 0.43 04/08/2021     Lab Results   Component Value Date    LABALBU 4.4 04/08/2021     Lab Results   Component Value Date    TSH 1.15 04/08/2021     Assessment:   Diagnosis Orders   1. Other constipation  Comprehensive Metabolic Panel    CBC    Urinalysis    Uric Acid    Vitamin B12    CK    Andreina Rush MD, Gastroenterology, [address]    CT ABDOMEN PELVIS W IV CONTRAST Additional Contrast? Oral    COVID-19   2. Gas bloat syndrome  Comprehensive Metabolic Panel    CBC    Urinalysis    Uric Acid    Vitamin B12    PRABHU Rush MD, Gastroenterology, [address]    CT ABDOMEN PELVIS W IV CONTRAST Additional Contrast? Oral    COVID-19   3.  Myalgia  Comprehensive Metabolic Panel    CBC    Urinalysis    Uric Acid    Vitamin B12    PRABHU Rush MD, Gastroenterology, [facility]    COVID-19   4. Foul smelling urine  Comprehensive Metabolic Panel    CBC    Urinalysis    Uric Acid    Vitamin B12    CK    [facility]   5. Fecal smearing  Comprehensive Metabolic Panel    CBC    Urinalysis    Uric Acid    Vitamin B12    CK    [facility]   6. Tongue burning sensation           Plan:  In view of his constipation advised to get a CT scan of the abdomen and pelvis and also referral done to cardiology as patient had no previous colonoscopy done and patient is started on MiraLAX 17 g daily for constipation  Patient also complaining of sore tongue and some loss of appetite and also having myalgias for the last 2 weeks and will get a Covid screening  Also advised patient to hold the fenofibrate because of the myalgia  Labs ordered to check for CBC CMP uric acid and urinalysis  Review in 3 weeks           1. Reena Mittal received counseling on the following healthy behaviors: nutrition and exercise    2. Prior labs and health maintenance reviewed. 3.  Discussed use, benefit, and side effects of prescribed medications. Barriers to medication compliance addressed. All his questions were answered. Pt voiced understanding. Reena Mittal will continue current medications, diet and exercise. Orders Placed This Encounter   Medications    polyethylene glycol (GLYCOLAX) 17 GM/SCOOP powder     Sig: Take 17 g by mouth daily as needed (Constipation)     Dispense:  510 g     Refill:  0          Completed Refills               Requested Prescriptions     Signed Prescriptions Disp Refills    polyethylene glycol (GLYCOLAX) 17 GM/SCOOP powder 510 g 0     Sig: Take 17 g by mouth daily as needed (Constipation)     4. Patient given educational materials - see patient instructions    5. Was a self-tracking handout given in paper form or via [employer]?   NO    Orders Placed This Encounter   Procedures    CT ABDOMEN PELVIS W IV CONTRAST Additional Contrast? Oral     Standing Status:   Future     Standing Expiration Date:   7/29/2022     Order Specific Question:   Additional Contrast?     Answer:   Oral     Order Specific Question:   Reason for exam:     Answer:   Constipation and stool leakage    Comprehensive Metabolic Panel     Standing Status:   Future     Standing Expiration Date:   7/29/2022    CBC     Standing Status:   Future     Standing Expiration Date:   7/29/2022    Urinalysis     Standing Status:   Future     Standing Expiration Date:   7/29/2022     Order Specific Question:   SPECIFY(EX-CATH,MIDSTREAM,CYSTO,ETC)? Answer:   midstream    Uric Acid     Standing Status:   Future     Standing Expiration Date:   7/29/2022    Vitamin B12     Standing Status:   Future     Standing Expiration Date:   7/29/2022    CK     Standing Status:   Future     Standing Expiration Date:   7/29/2022   Lizeth Ponce MD, Gastroenterology, [address]     Referral Priority:   Routine     Referral Type:   Eval and Treat     Referral Reason:   Specialty Services Required     Referred to Provider:   Aurora Barba MD     Requested Specialty:   Gastroenterology     Number of Visits Requested:   1     Return in about 3 weeks (around 8/19/2021). Patient voiced understanding and agreed to treatment plan. Electronically signed by Deejay Garcia MD on 7/29/2021 at 10:38 AM    This note is created with a voice recognition program and while intend to generate a document that accurately reflects the content of the visit, no guarantee can be provided that every mistake has been identified and corrected by editing.

## 2021-07-29 NOTE — TELEPHONE ENCOUNTER
----- Message from Ruby Hernandez MD sent at 7/29/2021  3:39 PM EDT -----  Start on Cipro 500 mg twice daily for 7 days for UTI and repeat urinalysis and culture and sensitivity in 2 weeks

## 2021-08-10 RX ORDER — HYDROCHLOROTHIAZIDE 25 MG/1
25 TABLET ORAL EVERY MORNING
Qty: 30 TABLET | Refills: 3 | Status: SHIPPED | OUTPATIENT
Start: 2021-08-10 | End: 2022-01-03

## 2021-08-11 ENCOUNTER — TELEPHONE (OUTPATIENT)
Dept: GASTROENTEROLOGY | Age: 66
End: 2021-08-11

## 2021-08-20 ENCOUNTER — OFFICE VISIT (OUTPATIENT)
Dept: INTERNAL MEDICINE CLINIC | Age: 66
End: 2021-08-20
Payer: COMMERCIAL

## 2021-08-20 VITALS
HEART RATE: 78 BPM | HEIGHT: 70 IN | SYSTOLIC BLOOD PRESSURE: 150 MMHG | BODY MASS INDEX: 42.66 KG/M2 | OXYGEN SATURATION: 99 % | RESPIRATION RATE: 20 BRPM | TEMPERATURE: 98.2 F | WEIGHT: 298 LBS | DIASTOLIC BLOOD PRESSURE: 90 MMHG

## 2021-08-20 DIAGNOSIS — I10 ESSENTIAL HYPERTENSION: Primary | ICD-10-CM

## 2021-08-20 DIAGNOSIS — E78.1 HIGH TRIGLYCERIDES: ICD-10-CM

## 2021-08-20 DIAGNOSIS — K59.09 OTHER CONSTIPATION: ICD-10-CM

## 2021-08-20 PROCEDURE — 99214 OFFICE O/P EST MOD 30 MIN: CPT | Performed by: INTERNAL MEDICINE

## 2021-08-20 RX ORDER — LUBIPROSTONE 8 UG/1
8 CAPSULE, GELATIN COATED ORAL DAILY
Qty: 4 CAPSULE | Refills: 0 | Status: ON HOLD | COMMUNITY
Start: 2021-08-20 | End: 2022-07-17

## 2021-08-20 ASSESSMENT — PATIENT HEALTH QUESTIONNAIRE - PHQ9
2. FEELING DOWN, DEPRESSED OR HOPELESS: 0
1. LITTLE INTEREST OR PLEASURE IN DOING THINGS: 0
SUM OF ALL RESPONSES TO PHQ QUESTIONS 1-9: 0
SUM OF ALL RESPONSES TO PHQ9 QUESTIONS 1 & 2: 0
SUM OF ALL RESPONSES TO PHQ QUESTIONS 1-9: 0
SUM OF ALL RESPONSES TO PHQ QUESTIONS 1-9: 0

## 2021-08-20 NOTE — PROGRESS NOTES
Augustine See is a 60 y.o. male who presents for   Chief Complaint   Patient presents with    Follow-up     still having some constipation issues; not fully going to the bathroom     Health Maintenance     d screen, colon, shingles, hep c, covid, hiv, tdap    and follow up of chronic medical problems. There is no problem list on file for this patient. HPI  Here for follow-up on constipation myalgia and patient feeling better after stopping the fenofibrate and he said he is following the diet and modified his diet habits    Current Outpatient Medications   Medication Sig Dispense Refill    lubiprostone (AMITIZA) 8 MCG CAPS capsule Take 1 capsule by mouth daily 4 capsule 0    hydroCHLOROthiazide (HYDRODIURIL) 25 MG tablet TAKE 1 TABLET BY MOUTH EVERY MORNING 30 tablet 3    polyethylene glycol (GLYCOLAX) 17 GM/SCOOP powder Take 17 g by mouth daily as needed (Constipation) 510 g 0    VITAMIN E PO Take 1 tablet by mouth daily       Cyanocobalamin (VITAMIN B 12 PO) Take 1 tablet by mouth       VITAMIN D-3 (COLECALCIFEROL) 400 UNITS TABS Take  by mouth daily. (Patient not taking: Reported on 8/20/2021)       No current facility-administered medications for this visit. Allergies   Allergen Reactions    Pcn [Penicillins]        Past Medical History:   Diagnosis Date    Hyperlipidemia     Hypertension     PSA elevation        Past Surgical History:   Procedure Laterality Date    PROSTATE BIOPSY      PROSTATE BIOPSY N/A 6/2/2021    PROSTATE BIOPSY WITH ULTRASOUND, OFFICE NOTIFYING ULTRASOUND performed by Matthias Benson MD at [place] [address]       No family history on file.   ROS   Constitutional:  Negative for fatigue, loss of appetite and unexpected weight change   HEENT            : Negative for neck stiffness and pain, no congestion or sinus pressure   Eyes                : No visual disturbance or pain   Cardiovascular: No chest pain or palpitations or leg swelling   Respiratory      : Negative for cough, shortness of breath or wheezing   Gastrointestinal: Negative for abdominal pain, constipation or diarrhea and bloating No nausea or vomiting   Genitourinary:     No urgency or frequency, no burning or hematuria   Musculoskeletal: No arthralgias, back pain or myalgias   Skin                  : Negative for rash or erythema   Neurological    : Negative for dizziness, weakness, tremors ,light headedness or syncope   Psychiatric       : Negative for dysphoric mood, sleep disturbances, nervous or anxious, or decreased concentration   All other review of systems was negative    Objective  Physical Examination:    Nursing note reviewed    BP (!) 150/90 (Site: Left Upper Arm, Position: Sitting, Cuff Size: Large Adult)   Pulse 78   Temp 98.2 °F (36.8 °C) (Temporal)   Resp 20   Ht 5' 10\" (1.778 m)   Wt 298 lb (135.2 kg)   SpO2 99%   BMI 42.76 kg/m²   BP Readings from Last 3 Encounters:   08/20/21 (!) 150/90   07/29/21 138/80   06/11/21 (!) 142/80         Constitutional:  Yonathan Fitch is oriented to place, person and time ,appears well-developed and well-nourished  HEENT:  Atraumatic and normocephalic, external ears normal bilaterally, nose normal no oropharyngeal exudate and is clear and moist  Eyes:  EOCM normal; conjunctivae normal; PERRLA bilaterally  Neck:  Normal range of motion, neck supple, no JVD and no thyromegaly  Cardiovascular:  RRR, normal heart sounds and intact distal pulses  Pulmonary:  effort normal and breath sounds normal bilaterally,no wheezes or rales, no respiratory distress  Abdominal:  Soft, non-tender; normal bowel sounds, no masses  Musculoskeletal:  Normal range of motion and no edema or tenderness bilaterally  No lymphadenopathy  Neurological:  alert, oriented, and normal reflexes bilaterally  Skin: warm and dry  Psychiatric:  normal mood and effect; behavior normal.    Labs:   No results found for: LABA1C  Lab Results   Component Value Date    CHOL 216 (H) 04/08/2021     Lab Results   Component Value Date    HDL 29 (L) 04/08/2021     No results found for: [address]  Lab Results   Component Value Date    TRIG 810 (H) 04/08/2021     No components found for: [place], [place]  Lab Results   Component Value Date    WBC 6.9 07/29/2021    HGB 14.8 07/29/2021    HCT 46.9 07/29/2021    MCV 93.6 07/29/2021     07/29/2021     No results found for: INR, PROTIME  Lab Results   Component Value Date    GLUCOSE 117 (H) 07/29/2021    CREATININE 1.11 07/29/2021    BUN 14 07/29/2021     07/29/2021    K 3.7 07/29/2021     07/29/2021    CO2 24 07/29/2021     Lab Results   Component Value Date    ALT 24 07/29/2021    AST 18 07/29/2021    ALKPHOS 81 07/29/2021    BILITOT 0.60 07/29/2021     Lab Results   Component Value Date    LABALBU 3.9 07/29/2021     Lab Results   Component Value Date    TSH 1.15 04/08/2021     Assessment:  1. Essential hypertension    2. High triglycerides    3. Other constipation        Plan:  Patient's myalgia resolved after stopping the fenofibrate and will continue to hold it and repeat lab work in 3 months  Blood pressure is borderline elevated and advised to continue hydrochlorothiazide and will reevaluate  Patient's constipation is slightly better with MiraLAX and advised patient to try Amitiza and office samples given for 4 days and advised him to call me back next week and also strongly counseled to make an appointment to see the gastroenterologist and he said he is going to call them and make an appointment  Review in 3 months           1. Candelario Clark received counseling on the following healthy behaviors: nutrition and exercise    2. Prior labs and health maintenance reviewed. 3.  Discussed use, benefit, and side effects of prescribed medications. Barriers to medication compliance addressed. All his questions were answered. Pt voiced understanding. Candelario Clark will continue current medications, diet and exercise.               Orders Placed This Encounter Medications    lubiprostone (AMITIZA) 8 MCG CAPS capsule     Sig: Take 1 capsule by mouth daily     Dispense:  4 capsule     Refill:  0     [id number] b1  11/21          Completed Refills               Requested Prescriptions     Signed Prescriptions Disp Refills    lubiprostone (AMITIZA) 8 MCG CAPS capsule 4 capsule 0     Sig: Take 1 capsule by mouth daily     4. Patient given educational materials - see patient instructions    5. Was a self-tracking handout given in paper form or via [email or web address]? NO    Orders Placed This Encounter   Procedures    CK     Standing Status:   Future     Standing Expiration Date:   8/20/2022    Comprehensive Metabolic Panel     Standing Status:   Future     Standing Expiration Date:   8/20/2022    Lipid Panel     Standing Status:   Future     Standing Expiration Date:   8/20/2022     Order Specific Question:   Is Patient Fasting?/# of Hours     Answer:   12     Return in about 3 months (around 11/20/2021). Patient voiced understanding and agreed to treatment plan. Electronically signed by Teo Whitfield MD on 8/20/2021 at 1:28 PM    This note is created with a voice recognition program and while intend to generate a document that accurately reflects the content of the visit, no guarantee can be provided that every mistake has been identified and corrected by editing.

## 2021-09-15 ENCOUNTER — TELEPHONE (OUTPATIENT)
Dept: INTERNAL MEDICINE CLINIC | Age: 66
End: 2021-09-15

## 2021-09-15 DIAGNOSIS — N39.0 URINARY TRACT INFECTION WITHOUT HEMATURIA, SITE UNSPECIFIED: Primary | ICD-10-CM

## 2021-09-15 NOTE — TELEPHONE ENCOUNTER
patient thinks he might still have a UTI. Says he has a foul smell and frequency. Is asking for a lab test to be ordered? He will go downstairs to get this done.     Please advise

## 2021-09-17 ENCOUNTER — HOSPITAL ENCOUNTER (OUTPATIENT)
Age: 66
Setting detail: SPECIMEN
Discharge: HOME | End: 2021-09-17
Payer: COMMERCIAL

## 2021-09-17 DIAGNOSIS — N39.0 URINARY TRACT INFECTION WITHOUT HEMATURIA, SITE UNSPECIFIED: ICD-10-CM

## 2021-09-17 LAB
-: NORMAL
AMORPHOUS: NORMAL
BACTERIA: NORMAL
BILIRUBIN URINE: NEGATIVE
CASTS UA: NORMAL /LPF (ref 0–8)
COLOR: YELLOW
COMMENT UA: ABNORMAL
CRYSTALS, UA: NORMAL /HPF
EPITHELIAL CELLS UA: NORMAL /HPF (ref 0–5)
GLUCOSE URINE: NEGATIVE
KETONES, URINE: NEGATIVE
LEUKOCYTE ESTERASE, URINE: ABNORMAL
MUCUS: NORMAL
NITRITE, URINE: POSITIVE
OTHER OBSERVATIONS UA: NORMAL
PH UA: 6.5 (ref 5–8)
PROTEIN UA: NEGATIVE
RBC UA: NORMAL /HPF (ref 0–4)
RENAL EPITHELIAL, UA: NORMAL /HPF
SPECIFIC GRAVITY UA: 1.01 (ref 1–1.03)
TRICHOMONAS: NORMAL
TURBIDITY: CLEAR
URINE HGB: NEGATIVE
UROBILINOGEN, URINE: NORMAL
WBC UA: NORMAL /HPF (ref 0–5)
YEAST: NORMAL

## 2021-09-19 LAB
CULTURE: NORMAL
Lab: NORMAL
SPECIMEN DESCRIPTION: NORMAL

## 2021-09-20 ENCOUNTER — TELEPHONE (OUTPATIENT)
Dept: INTERNAL MEDICINE CLINIC | Age: 66
End: 2021-09-20

## 2021-09-20 RX ORDER — CIPROFLOXACIN 500 MG/1
500 TABLET, FILM COATED ORAL 2 TIMES DAILY
Qty: 20 TABLET | Refills: 0 | Status: SHIPPED | OUTPATIENT
Start: 2021-09-20 | End: 2021-09-30

## 2021-09-20 NOTE — TELEPHONE ENCOUNTER
----- Message from Gomez Xiao MD sent at 9/20/2021  1:46 PM EDT -----  Patient's urinalysis shows nitrite positive but culture was negative and restart on Cipro 500 mg twice daily for 10 days and asked patient to follow-up with urology whom he has seen before

## 2021-10-29 ENCOUNTER — TELEPHONE (OUTPATIENT)
Dept: INTERNAL MEDICINE CLINIC | Age: 66
End: 2021-10-29

## 2021-10-29 RX ORDER — CIPROFLOXACIN 500 MG/1
500 TABLET, FILM COATED ORAL 2 TIMES DAILY
Qty: 20 TABLET | Refills: 0 | Status: SHIPPED | OUTPATIENT
Start: 2021-10-29 | End: 2021-11-08

## 2021-10-29 NOTE — TELEPHONE ENCOUNTER
cipro sent per request. However, if this returns, he must have a urine culture as the bacteria may be resistant to this antibiotic and it will not clear

## 2022-01-03 RX ORDER — HYDROCHLOROTHIAZIDE 25 MG/1
25 TABLET ORAL EVERY MORNING
Qty: 30 TABLET | Refills: 3 | Status: SHIPPED | OUTPATIENT
Start: 2022-01-03 | End: 2022-05-27

## 2022-01-03 NOTE — TELEPHONE ENCOUNTER
Troy Aviles is calling to request a refill on the following medication(s):    Medication Request:  Last fill 08/10/21 #30 w 3R    Requested Prescriptions     Pending Prescriptions Disp Refills    hydroCHLOROthiazide (HYDRODIURIL) 25 MG tablet [Pharmacy Med Name: HYDROCHLOROTHIAZIDE 25MG TABLETS] 30 tablet 3     Sig: TAKE 1 TABLET BY MOUTH EVERY MORNING       Last Visit Date (If Applicable):  0/55/4773    Next Visit Date:    Visit date not found

## 2022-01-27 ENCOUNTER — TELEPHONE (OUTPATIENT)
Dept: INTERNAL MEDICINE CLINIC | Age: 67
End: 2022-01-27

## 2022-01-27 RX ORDER — PREDNISONE 10 MG/1
10 TABLET ORAL 3 TIMES DAILY
Qty: 15 TABLET | Refills: 0 | Status: SHIPPED | OUTPATIENT
Start: 2022-01-27 | End: 2022-02-01

## 2022-02-23 ENCOUNTER — TELEPHONE (OUTPATIENT)
Dept: INTERNAL MEDICINE CLINIC | Age: 67
End: 2022-02-23

## 2022-02-23 RX ORDER — PREDNISONE 10 MG/1
10 TABLET ORAL
Qty: 15 TABLET | Refills: 0 | Status: SHIPPED | OUTPATIENT
Start: 2022-02-23 | End: 2022-02-28

## 2022-02-23 NOTE — TELEPHONE ENCOUNTER
Send prescription for prednisone 10 mg 3 times daily for 5 days and also advised him to go to ER if any worse

## 2022-02-23 NOTE — TELEPHONE ENCOUNTER
Patient calling states he woke up with a swollen lips not sure if its an allergic reaction and he still don't have insurance at this time   wife is asking for a script for prednisone says she will just have to pay for it

## 2022-04-27 ENCOUNTER — TELEPHONE (OUTPATIENT)
Dept: FAMILY MEDICINE CLINIC | Age: 67
End: 2022-04-27

## 2022-04-27 NOTE — TELEPHONE ENCOUNTER
Cologuard order placed 7/9/21. Client states he never received the cologuard. Please place order for cologuard.  Address as follows: [address] [address]

## 2022-05-27 RX ORDER — HYDROCHLOROTHIAZIDE 25 MG/1
25 TABLET ORAL EVERY MORNING
Qty: 30 TABLET | Refills: 0 | Status: SHIPPED | OUTPATIENT
Start: 2022-05-27 | End: 2022-06-03 | Stop reason: SDUPTHER

## 2022-05-27 NOTE — TELEPHONE ENCOUNTER
Tianna Figueroa is calling to request a refill on the following medication(s):    Medication Request:    Last filled 1/3/2022 #30 with 3 RF  Pended with note, pt is due for appt.       Requested Prescriptions     Pending Prescriptions Disp Refills    hydroCHLOROthiazide (HYDRODIURIL) 25 MG tablet [Pharmacy Med Name: HYDROCHLOROTHIAZIDE 25MG TABLETS] 30 tablet 3     Sig: TAKE 1 TABLET BY MOUTH EVERY MORNING       Last Visit Date (If Applicable):  6/71/9986    Next Visit Date:    Visit date not found

## 2022-06-01 DIAGNOSIS — Z12.11 SCREENING FOR COLON CANCER: Primary | ICD-10-CM

## 2022-06-03 RX ORDER — HYDROCHLOROTHIAZIDE 25 MG/1
25 TABLET ORAL EVERY MORNING
Qty: 30 TABLET | Refills: 0 | Status: SHIPPED | OUTPATIENT
Start: 2022-06-03 | End: 2022-07-01

## 2022-07-01 RX ORDER — HYDROCHLOROTHIAZIDE 25 MG/1
25 TABLET ORAL EVERY MORNING
Qty: 30 TABLET | Refills: 0 | Status: SHIPPED | OUTPATIENT
Start: 2022-07-01 | End: 2022-07-08 | Stop reason: SDUPTHER

## 2022-07-01 NOTE — TELEPHONE ENCOUNTER
Aletha Monk is calling to request a refill on the following medication(s):    Last Visit Date (If Applicable):  6/42/5844    Next Visit Date:    7/8/2022    Medication Request:  Requested Prescriptions     Pending Prescriptions Disp Refills    hydroCHLOROthiazide (HYDRODIURIL) 25 MG tablet [Pharmacy Med Name: HYDROCHLOROTHIAZIDE 25MG TABLETS] 30 tablet 0     Sig: TAKE 1 TABLET BY MOUTH EVERY MORNING

## 2022-07-08 ENCOUNTER — OFFICE VISIT (OUTPATIENT)
Dept: INTERNAL MEDICINE CLINIC | Age: 67
End: 2022-07-08
Payer: MEDICARE

## 2022-07-08 VITALS
RESPIRATION RATE: 24 BRPM | HEIGHT: 70 IN | WEIGHT: 312.6 LBS | DIASTOLIC BLOOD PRESSURE: 86 MMHG | OXYGEN SATURATION: 98 % | HEART RATE: 91 BPM | BODY MASS INDEX: 44.75 KG/M2 | TEMPERATURE: 98.2 F | SYSTOLIC BLOOD PRESSURE: 158 MMHG

## 2022-07-08 DIAGNOSIS — E66.01 OBESITY, CLASS III, BMI 40-49.9 (MORBID OBESITY) (HCC): ICD-10-CM

## 2022-07-08 DIAGNOSIS — I10 ESSENTIAL HYPERTENSION: Primary | ICD-10-CM

## 2022-07-08 DIAGNOSIS — Z71.3 DIETARY COUNSELING: ICD-10-CM

## 2022-07-08 DIAGNOSIS — F32.A DEPRESSION, UNSPECIFIED DEPRESSION TYPE: ICD-10-CM

## 2022-07-08 DIAGNOSIS — E78.1 HIGH TRIGLYCERIDES: ICD-10-CM

## 2022-07-08 DIAGNOSIS — Z91.81 AT HIGH RISK FOR FALLS: ICD-10-CM

## 2022-07-08 PROCEDURE — 1036F TOBACCO NON-USER: CPT | Performed by: INTERNAL MEDICINE

## 2022-07-08 PROCEDURE — G0447 BEHAVIOR COUNSEL OBESITY 15M: HCPCS | Performed by: INTERNAL MEDICINE

## 2022-07-08 PROCEDURE — G8427 DOCREV CUR MEDS BY ELIG CLIN: HCPCS | Performed by: INTERNAL MEDICINE

## 2022-07-08 PROCEDURE — G8417 CALC BMI ABV UP PARAM F/U: HCPCS | Performed by: INTERNAL MEDICINE

## 2022-07-08 PROCEDURE — G0444 DEPRESSION SCREEN ANNUAL: HCPCS | Performed by: INTERNAL MEDICINE

## 2022-07-08 PROCEDURE — 1123F ACP DISCUSS/DSCN MKR DOCD: CPT | Performed by: INTERNAL MEDICINE

## 2022-07-08 PROCEDURE — 99214 OFFICE O/P EST MOD 30 MIN: CPT | Performed by: INTERNAL MEDICINE

## 2022-07-08 PROCEDURE — 3017F COLORECTAL CA SCREEN DOC REV: CPT | Performed by: INTERNAL MEDICINE

## 2022-07-08 RX ORDER — HYDROCHLOROTHIAZIDE 25 MG/1
25 TABLET ORAL EVERY MORNING
Qty: 90 TABLET | Refills: 0 | Status: SHIPPED | OUTPATIENT
Start: 2022-07-08 | End: 2022-09-06

## 2022-07-08 SDOH — ECONOMIC STABILITY: FOOD INSECURITY: WITHIN THE PAST 12 MONTHS, YOU WORRIED THAT YOUR FOOD WOULD RUN OUT BEFORE YOU GOT MONEY TO BUY MORE.: PATIENT DECLINED

## 2022-07-08 SDOH — ECONOMIC STABILITY: FOOD INSECURITY: WITHIN THE PAST 12 MONTHS, THE FOOD YOU BOUGHT JUST DIDN'T LAST AND YOU DIDN'T HAVE MONEY TO GET MORE.: PATIENT DECLINED

## 2022-07-08 ASSESSMENT — PATIENT HEALTH QUESTIONNAIRE - PHQ9
4. FEELING TIRED OR HAVING LITTLE ENERGY: 2
SUM OF ALL RESPONSES TO PHQ QUESTIONS 1-9: 12
6. FEELING BAD ABOUT YOURSELF - OR THAT YOU ARE A FAILURE OR HAVE LET YOURSELF OR YOUR FAMILY DOWN: 1
9. THOUGHTS THAT YOU WOULD BE BETTER OFF DEAD, OR OF HURTING YOURSELF: 0
SUM OF ALL RESPONSES TO PHQ QUESTIONS 1-9: 12
10. IF YOU CHECKED OFF ANY PROBLEMS, HOW DIFFICULT HAVE THESE PROBLEMS MADE IT FOR YOU TO DO YOUR WORK, TAKE CARE OF THINGS AT HOME, OR GET ALONG WITH OTHER PEOPLE: 2
SUM OF ALL RESPONSES TO PHQ QUESTIONS 1-9: 12
5. POOR APPETITE OR OVEREATING: 1
7. TROUBLE CONCENTRATING ON THINGS, SUCH AS READING THE NEWSPAPER OR WATCHING TELEVISION: 0
8. MOVING OR SPEAKING SO SLOWLY THAT OTHER PEOPLE COULD HAVE NOTICED. OR THE OPPOSITE, BEING SO FIGETY OR RESTLESS THAT YOU HAVE BEEN MOVING AROUND A LOT MORE THAN USUAL: 0
1. LITTLE INTEREST OR PLEASURE IN DOING THINGS: 2
2. FEELING DOWN, DEPRESSED OR HOPELESS: 3
SUM OF ALL RESPONSES TO PHQ QUESTIONS 1-9: 12
SUM OF ALL RESPONSES TO PHQ9 QUESTIONS 1 & 2: 5
3. TROUBLE FALLING OR STAYING ASLEEP: 3

## 2022-07-08 ASSESSMENT — SOCIAL DETERMINANTS OF HEALTH (SDOH): HOW HARD IS IT FOR YOU TO PAY FOR THE VERY BASICS LIKE FOOD, HOUSING, MEDICAL CARE, AND HEATING?: PATIENT DECLINED

## 2022-07-08 NOTE — PROGRESS NOTES
Peg Dye is a 73 y.o. male who presents for   Chief Complaint   Patient presents with    Hypertension     follow up, med refills.  Health Maintenance     awv, covid, hiv, hep c, tdap, dm, shing, pneumo, psa    Other     pt states his tongue sometimes swells. and follow up of chronic medical problems. There is no problem list on file for this patient. HPI  Here for follow-up on blood pressure denies any new complaints    Current Outpatient Medications   Medication Sig Dispense Refill    hydroCHLOROthiazide (HYDRODIURIL) 25 MG tablet Take 1 tablet by mouth every morning 90 tablet 0    lubiprostone (AMITIZA) 8 MCG CAPS capsule Take 1 capsule by mouth daily 4 capsule 0    VITAMIN E PO Take 1 tablet by mouth daily       Cyanocobalamin (VITAMIN B 12 PO) Take 1 tablet by mouth       VITAMIN D-3 (COLECALCIFEROL) 400 UNITS TABS Take by mouth daily        No current facility-administered medications for this visit. Allergies   Allergen Reactions    Pcn [Penicillins]        Past Medical History:   Diagnosis Date    Hyperlipidemia     Hypertension     PSA elevation        Past Surgical History:   Procedure Laterality Date    PROSTATE BIOPSY      PROSTATE BIOPSY N/A 6/2/2021    PROSTATE BIOPSY WITH ULTRASOUND, OFFICE NOTIFYING ULTRASOUND performed by Virginia Mcconnell MD at [place] [id number]       No family history on file.   ROS   Constitutional:  Negative for fatigue, loss of appetite and unexpected weight change   HEENT            : Negative for neck stiffness and pain, no congestion or sinus pressure   Eyes                : No visual disturbance or pain   Cardiovascular: No chest pain or palpitations or leg swelling   Respiratory      : Negative for cough, shortness of breath or wheezing   Gastrointestinal: Negative for abdominal pain, constipation or diarrhea and bloating No nausea or vomiting   Genitourinary:     No urgency or frequency, no burning or hematuria   Musculoskeletal: No arthralgias, back pain or myalgias   Skin                  : Negative for rash or erythema   Neurological    : Negative for dizziness, weakness, tremors ,light headedness or syncope   Psychiatric       : Negative for dysphoric mood, sleep disturbances, nervous or anxious, or decreased concentration   All other review of systems was negative    Objective  Physical Examination:    Nursing note reviewed    BP (!) 158/86 (Site: Left Upper Arm, Position: Sitting, Cuff Size: Large Adult)   Pulse 91   Temp 98.2 °F (36.8 °C) (Temporal)   Resp 24   Ht 5' 10\" (1.778 m)   Wt (!) 312 lb 9.6 oz (141.8 kg)   SpO2 98%   BMI 44.85 kg/m²   BP Readings from Last 3 Encounters:   07/08/22 (!) 158/86   08/20/21 (!) 150/90   07/29/21 138/80         Constitutional:  Eddie Hernandez is oriented to place, person and time ,appears well-developed and well-nourished  HEENT:  Atraumatic and normocephalic, external ears normal bilaterally, nose normal no oropharyngeal exudate and is clear and moist  Eyes:  EOCM normal; conjunctivae normal; PERRLA bilaterally  Neck:  Normal range of motion, neck supple, no JVD and no thyromegaly  Cardiovascular:  RRR, normal heart sounds and intact distal pulses  Pulmonary:  effort normal and breath sounds normal bilaterally,no wheezes or rales, no respiratory distress  Abdominal:  Soft, non-tender; normal bowel sounds, no masses  Musculoskeletal:  Normal range of motion and no edema or tenderness bilaterally  No lymphadenopathy  Neurological:  alert, oriented, and normal reflexes bilaterally  Skin: warm and dry  Psychiatric:  normal mood and effect; behavior normal.    Labs:   No results found for: LABA1C  Lab Results   Component Value Date    CHOL 216 (H) 04/08/2021     Lab Results   Component Value Date    HDL 29 (L) 04/08/2021     No results found for: [address]  Lab Results   Component Value Date    TRIG 810 (H) 04/08/2021     No results found for: [place], [place]  Lab Results   Component Value Date    WBC 6.9 07/29/2021    HGB 14.8 07/29/2021    HCT 46.9 07/29/2021    MCV 93.6 07/29/2021     07/29/2021     No results found for: INR, PROTIME  Lab Results   Component Value Date    GLUCOSE 117 (H) 07/29/2021    CREATININE 1.11 07/29/2021    BUN 14 07/29/2021     07/29/2021    K 3.7 07/29/2021     07/29/2021    CO2 24 07/29/2021     Lab Results   Component Value Date    ALT 24 07/29/2021    AST 18 07/29/2021    ALKPHOS 81 07/29/2021    BILITOT 0.60 07/29/2021     Lab Results   Component Value Date    LABALBU 3.9 07/29/2021     Lab Results   Component Value Date    TSH 1.15 04/08/2021     Assessment:  1. Essential hypertension    2. Obesity, Class III, BMI 40-49.9 (morbid obesity) ([address]    3. High triglycerides    4. At high risk for falls    5. Depression, unspecified depression type        Plan:  Patient blood pressure is slightly elevated and he says he is not taking his blood pressure medication as he ran out about 3 weeks back and advised him to restart and monitor blood pressure and call me back  Patient had never done the blood test for his triglycerides and not taking any medications and so new labs ordered  Patient feels more frustrated than depression and I advised him to try medications and office samples of Viibryd given and advised him to call me back in 2 weeks  Strongly counseled about diet exercise and weight loss  Review in 4 months           1. Shoaib Chris received counseling on the following healthy behaviors: nutrition and exercise    2. Prior labs and health maintenance reviewed. 3.  Discussed use, benefit, and side effects of prescribed medications. Barriers to medication compliance addressed. All his questions were answered. Pt voiced understanding. Shoaib Chris will continue current medications, diet and exercise.               Orders Placed This Encounter   Medications    hydroCHLOROthiazide (HYDRODIURIL) 25 MG tablet     Sig: Take 1 tablet by mouth every morning     Dispense:  90 tablet     Refill:  0          Completed Refills               Requested Prescriptions     Signed Prescriptions Disp Refills    hydroCHLOROthiazide (HYDRODIURIL) 25 MG tablet 90 tablet 0     Sig: Take 1 tablet by mouth every morning     4. Patient given educational materials - see patient instructions    5. Was a self-tracking handout given in paper form or via [facility]? NO    Orders Placed This Encounter   Procedures    Comprehensive Metabolic Panel     Standing Status:   Future     Standing Expiration Date:   7/8/2023    CBC     Standing Status:   Future     Standing Expiration Date:   7/8/2023    TSH     Standing Status:   Future     Standing Expiration Date:   7/8/2023    Lipid Panel     Standing Status:   Future     Standing Expiration Date:   7/8/2023     Order Specific Question:   Is Patient Fasting?/# of Hours     Answer:   12     No follow-ups on file. Patient voiced understanding and agreed to treatment plan. Electronically signed by Ayanna Mora MD on 7/8/2022 at 12:23 PM    This note is created with a voice recognition program and while intend to generate a document that accurately reflects the content of the visit, no guarantee can be provided that every mistake has been identified and corrected by editing. On the basis of positive falls risk screening, assessment and plan is as follows: home safety tips provided. BMI was elevated today, and weight loss plan recommended is : daily exercise regimen.

## 2022-07-11 ENCOUNTER — HOSPITAL ENCOUNTER (OUTPATIENT)
Age: 67
Setting detail: SPECIMEN
Discharge: HOME | End: 2022-07-11

## 2022-07-11 DIAGNOSIS — E78.1 HIGH TRIGLYCERIDES: ICD-10-CM

## 2022-07-11 DIAGNOSIS — E66.01 OBESITY, CLASS III, BMI 40-49.9 (MORBID OBESITY) (HCC): ICD-10-CM

## 2022-07-11 DIAGNOSIS — I10 ESSENTIAL HYPERTENSION: ICD-10-CM

## 2022-07-11 LAB
ALBUMIN SERPL-MCNC: 4 G/DL (ref 3.5–5.2)
ALBUMIN/GLOBULIN RATIO: 1.1 (ref 1–2.5)
ALP BLD-CCNC: 97 U/L (ref 40–129)
ALT SERPL-CCNC: 25 U/L (ref 5–41)
ANION GAP SERPL CALCULATED.3IONS-SCNC: 18 MMOL/L (ref 9–17)
AST SERPL-CCNC: 23 U/L
BILIRUB SERPL-MCNC: 1.03 MG/DL (ref 0.3–1.2)
BUN BLDV-MCNC: 12 MG/DL (ref 8–23)
CALCIUM SERPL-MCNC: 9.1 MG/DL (ref 8.6–10.4)
CHLORIDE BLD-SCNC: 98 MMOL/L (ref 98–107)
CHOLESTEROL/HDL RATIO: 5.6
CHOLESTEROL: 192 MG/DL
CO2: 25 MMOL/L (ref 20–31)
CREAT SERPL-MCNC: 0.95 MG/DL (ref 0.7–1.2)
GFR AFRICAN AMERICAN: >60 ML/MIN
GFR NON-AFRICAN AMERICAN: >60 ML/MIN
GFR SERPL CREATININE-BSD FRML MDRD: ABNORMAL ML/MIN/{1.73_M2}
GLUCOSE BLD-MCNC: 118 MG/DL (ref 70–99)
HCT VFR BLD CALC: 47.8 % (ref 40.7–50.3)
HDLC SERPL-MCNC: 34 MG/DL
HEMOGLOBIN: 16.2 G/DL (ref 13–17)
LDL CHOLESTEROL: 84 MG/DL (ref 0–130)
MCH RBC QN AUTO: 30.4 PG (ref 25.2–33.5)
MCHC RBC AUTO-ENTMCNC: 33.9 G/DL (ref 28.4–34.8)
MCV RBC AUTO: 89.7 FL (ref 82.6–102.9)
NRBC AUTOMATED: 0 PER 100 WBC
PDW BLD-RTO: 12.6 % (ref 11.8–14.4)
PLATELET # BLD: 236 K/UL (ref 138–453)
PMV BLD AUTO: 11.2 FL (ref 8.1–13.5)
POTASSIUM SERPL-SCNC: 4 MMOL/L (ref 3.7–5.3)
RBC # BLD: 5.33 M/UL (ref 4.21–5.77)
SODIUM BLD-SCNC: 141 MMOL/L (ref 135–144)
TOTAL PROTEIN: 7.5 G/DL (ref 6.4–8.3)
TRIGL SERPL-MCNC: 368 MG/DL
TSH SERPL DL<=0.05 MIU/L-ACNC: 1.83 UIU/ML (ref 0.3–5)
WBC # BLD: 9.1 K/UL (ref 3.5–11.3)

## 2022-07-11 NOTE — PATIENT INSTRUCTIONS
Learning About Obesity  What is obesity? Obesity means having an unhealthy amount of body fat. This puts your health in danger. It can lead to other health problems, such as type 2 diabetes and highblood pressure. How do you know if your weight is in the obesity range? To know if your weight is in the obesity range, your doctor looks at your bodymass index (BMI) and waist size. BMI is a number that is calculated from your weight and your height. To figure out your BMI for yourself, you can use an online tool, such as http://www.Netology/ on [employer]. If your BMI is 30.0 or higher, it falls within the obesity range. Keep in mind that BMI and waist size are only guides. They are not tools to determine yourideal body weight. What causes obesity? When you take in more calories than you burn off, you gain weight. How you eat, how active you are, and other things affect how your body uses calories andwhether you gain weight. If you have family members who have too much body fat, you may have inherited a tendency to gain weight. And your family also helps form your eating andlifestyle habits, which can lead to obesity. Also, our busy lives make it harder to plan and cook healthy meals. For many of us, it's easier to reach for prepared foods, go out to eat, or go to the drive-through. But these foods are often high in saturated fat and calories. Portions are often too large. What can you do to reach a healthy weight? Focus on health, not diets. Diets are hard to stay on and don't work in the long run. It is very hard tostay with a diet that includes lots of big changes in your eating habits. Instead of a diet, focus on lifestyle changes that will improve your health and achieve the right balance of energy and calories. To lose weight, you need to burn more calories than you take in.  You can do it by eating healthy foods in reasonable amounts and becoming more active, even a little bit every day. Making small changes over time can add up to a lot. Make a plan for change. Many people have found that naming their reasons for change and staying focused on their plan can make a big difference. Work with your doctor tocreate a plan that is right for you.  Ask yourself: Jose Son are my personal, most powerful reasons for wanting this change? What will my life look like when I've made the change? \"   Set your long-term goal. Make it specific, such as \"I will lose x pounds. \"  Lincoln Rash your long-term goal into smaller, short-term goals. Make these small steps specific and within your reach, things you know you can do. These steps are what keep you going from day to day. Talk with your doctor about other weight-loss options. If you have a BMI in a certain range and have not been able to lose weight with diet and exercise, medicine or surgery may be an option for you. Before your doctor will prescribe medicines or surgery, he or she will probably want you to be more active and follow your healthy eating plan for a period of time. These habits are key lifelong changes for managing your weight, with or without other medical treatment. And these changes can help you avoid weight-relatedhealth problems. How can you stay on your plan for change? Be ready. Choose to start during a time when there are few events like holidays, socialevents, and high-stress periods. These events might trigger slip-ups. Decide on your first few steps. Most people have more success when they make small changes, one step at a time. For example, you might switch a daily candy bar to a piece of fruit, walk10 minutes more, or add more vegetables to a meal.  Line up your support people. Make sure you're not going to be alone as you make this change. Connect with people who understand how important it is to you.  Ask family members and friends for help in keeping with your plan. And think about who could make itharder for you, and how to handle them. Try tracking. People who keep track of what they eat, feel, and do are better at losingweight. Try writing down things like:   What and how much you eat.  How you feel before and after each meal.   Details about each meal (like eating out or at home, eating alone, or with friends or family).  What you do to be active. Look and plan. As you track, look for patterns that you may want to change. Take note of:   When you eat and whether you skip meals.  How often you eat out.  How many fruits and vegetables you eat.  When you eat beyond feeling full.  When and why you eat for reasons other than being hungry. When you stray from your plan, don't get upset. Figure out what made you slipup and how you can fix it. Can you take medicines or have surgery to lose weight? If you have a BMI in a certain range and have not been able to lose weight withdiet and exercise, medicine or surgery may be an option for you. If you have a BMI of at least 30.0 (or a BMI of at least 27.0 and another health problem related to your weight), ask your doctor about weight-loss medicines. They work by making you feel less hungry, making you feel full more quickly, or changing how you digest fat. Medicines are used along with dietchanges and more physical activity to help you make lasting changes. If you have a BMI of 40.0 or more (or a BMI of 35.0 or more and another health problem related to your weight), your doctor may talk with you about surgery. Weight-loss surgery has risks, and you will need to work with your doctor tocompare the risk of having obesity with the risks of surgery. With any option you choose, you will still need to eat a healthy diet and getregular exercise. Follow-up care is a key part of your treatment and safety. Be sure to make and go to all appointments, and call your doctor if you are having problems.  It's also a good idea to know your test results and keep alist of the medicines you take. Where can you learn more? Go to [email or web address] org and sign in to your M[employer] account. Enter N111 in the [facility] box to learn more about \"Learning About Obesity. \"     If you do not have an account, please click on the \"Sign Up Now\" link. Current as of: December 27, 2021               Content Version: 13.3  © 2006-2022 Healthwise, Incorporated. Care instructions adapted under license by [facility] ([facility]). If you have questions about a medical condition or this instruction, always ask your healthcare professional. [address] any warranty or liability for your use of this information.

## 2022-07-17 ENCOUNTER — APPOINTMENT (OUTPATIENT)
Dept: GENERAL RADIOLOGY | Age: 67
End: 2022-07-17
Payer: MEDICARE

## 2022-07-17 ENCOUNTER — HOSPITAL ENCOUNTER (OUTPATIENT)
Age: 67
Setting detail: OBSERVATION
Discharge: HOME | End: 2022-07-18
Attending: EMERGENCY MEDICINE | Admitting: INTERNAL MEDICINE
Payer: MEDICARE

## 2022-07-17 DIAGNOSIS — T78.3XXA ANGIOEDEMA, INITIAL ENCOUNTER: Primary | ICD-10-CM

## 2022-07-17 PROBLEM — E78.5 HYPERLIPIDEMIA: Status: ACTIVE | Noted: 2022-07-17

## 2022-07-17 PROBLEM — I10 HYPERTENSION: Status: ACTIVE | Noted: 2022-07-17

## 2022-07-17 PROBLEM — R97.20 PSA ELEVATION: Status: ACTIVE | Noted: 2022-07-17

## 2022-07-17 PROBLEM — K58.1 IRRITABLE BOWEL SYNDROME WITH CONSTIPATION: Status: ACTIVE | Noted: 2022-07-17

## 2022-07-17 PROBLEM — T78.3XXD: Status: ACTIVE | Noted: 2022-07-17

## 2022-07-17 LAB
-: NORMAL
ABSOLUTE EOS #: <0.03 K/UL (ref 0–0.44)
ABSOLUTE IMMATURE GRANULOCYTE: 0.1 K/UL (ref 0–0.3)
ABSOLUTE LYMPH #: 2.88 K/UL (ref 1.1–3.7)
ABSOLUTE MONO #: 0.7 K/UL (ref 0.1–1.2)
ALBUMIN SERPL-MCNC: 4.1 G/DL (ref 3.5–5.2)
ALP BLD-CCNC: 104 U/L (ref 40–129)
ALT SERPL-CCNC: 19 U/L (ref 5–41)
ANION GAP SERPL CALCULATED.3IONS-SCNC: 13 MMOL/L (ref 9–17)
AST SERPL-CCNC: 14 U/L
BASOPHILS # BLD: 0 % (ref 0–2)
BASOPHILS ABSOLUTE: 0.04 K/UL (ref 0–0.2)
BILIRUB SERPL-MCNC: 1.38 MG/DL (ref 0.3–1.2)
BILIRUBIN URINE: NEGATIVE
BUN BLDV-MCNC: 9 MG/DL (ref 8–23)
BUN/CREAT BLD: 9 (ref 9–20)
CALCIUM SERPL-MCNC: 9 MG/DL (ref 8.6–10.4)
CHLORIDE BLD-SCNC: 98 MMOL/L (ref 98–107)
CO2: 27 MMOL/L (ref 20–31)
COLOR: YELLOW
CREAT SERPL-MCNC: 0.98 MG/DL (ref 0.7–1.2)
EOSINOPHILS RELATIVE PERCENT: 0 % (ref 1–4)
EPITHELIAL CELLS UA: NORMAL /HPF (ref 0–5)
GFR AFRICAN AMERICAN: >60 ML/MIN
GFR NON-AFRICAN AMERICAN: >60 ML/MIN
GFR SERPL CREATININE-BSD FRML MDRD: ABNORMAL ML/MIN/{1.73_M2}
GLUCOSE BLD-MCNC: 155 MG/DL (ref 70–99)
GLUCOSE URINE: NEGATIVE
HCT VFR BLD CALC: 51.4 % (ref 40.7–50.3)
HEMOGLOBIN: 16.7 G/DL (ref 13–17)
IMMATURE GRANULOCYTES: 1 %
KETONES, URINE: ABNORMAL
LEUKOCYTE ESTERASE, URINE: ABNORMAL
LYMPHOCYTES # BLD: 18 % (ref 24–43)
MAGNESIUM: 2.1 MG/DL (ref 1.6–2.6)
MCH RBC QN AUTO: 30.1 PG (ref 25.2–33.5)
MCHC RBC AUTO-ENTMCNC: 32.5 G/DL (ref 28.4–34.8)
MCV RBC AUTO: 92.6 FL (ref 82.6–102.9)
MONOCYTES # BLD: 4 % (ref 3–12)
NITRITE, URINE: POSITIVE
NRBC AUTOMATED: 0 PER 100 WBC
PDW BLD-RTO: 12.4 % (ref 11.8–14.4)
PH UA: 6 (ref 5–8)
PLATELET # BLD: 282 K/UL (ref 138–453)
PMV BLD AUTO: 10.5 FL (ref 8.1–13.5)
POTASSIUM SERPL-SCNC: 3.5 MMOL/L (ref 3.7–5.3)
PRO-BNP: 32 PG/ML
PROTEIN UA: NEGATIVE
RBC # BLD: 5.55 M/UL (ref 4.21–5.77)
RBC UA: NORMAL /HPF (ref 0–2)
SEG NEUTROPHILS: 77 % (ref 36–65)
SEGMENTED NEUTROPHILS ABSOLUTE COUNT: 12.11 K/UL (ref 1.5–8.1)
SODIUM BLD-SCNC: 138 MMOL/L (ref 135–144)
SPECIFIC GRAVITY UA: 1.02 (ref 1–1.03)
TOTAL PROTEIN: 7.8 G/DL (ref 6.4–8.3)
TROPONIN, HIGH SENSITIVITY: 7 NG/L (ref 0–22)
TURBIDITY: ABNORMAL
URINE HGB: NEGATIVE
UROBILINOGEN, URINE: NORMAL
WBC # BLD: 15.9 K/UL (ref 3.5–11.3)
WBC UA: NORMAL /HPF (ref 0–5)

## 2022-07-17 PROCEDURE — 85025 COMPLETE CBC W/AUTO DIFF WBC: CPT

## 2022-07-17 PROCEDURE — 93005 ELECTROCARDIOGRAM TRACING: CPT | Performed by: EMERGENCY MEDICINE

## 2022-07-17 PROCEDURE — 6370000000 HC RX 637 (ALT 250 FOR IP): Performed by: NURSE PRACTITIONER

## 2022-07-17 PROCEDURE — 2500000003 HC RX 250 WO HCPCS: Performed by: EMERGENCY MEDICINE

## 2022-07-17 PROCEDURE — 71045 X-RAY EXAM CHEST 1 VIEW: CPT

## 2022-07-17 PROCEDURE — 99219 PR INITIAL OBSERVATION CARE/DAY 50 MINUTES: CPT | Performed by: NURSE PRACTITIONER

## 2022-07-17 PROCEDURE — 96372 THER/PROPH/DIAG INJ SC/IM: CPT

## 2022-07-17 PROCEDURE — 99220 PR INITIAL OBSERVATION CARE/DAY 70 MINUTES: CPT | Performed by: NURSE PRACTITIONER

## 2022-07-17 PROCEDURE — G0378 HOSPITAL OBSERVATION PER HR: HCPCS

## 2022-07-17 PROCEDURE — 99285 EMERGENCY DEPT VISIT HI MDM: CPT

## 2022-07-17 PROCEDURE — 6360000002 HC RX W HCPCS: Performed by: NURSE PRACTITIONER

## 2022-07-17 PROCEDURE — 2580000003 HC RX 258: Performed by: NURSE PRACTITIONER

## 2022-07-17 PROCEDURE — 96375 TX/PRO/DX INJ NEW DRUG ADDON: CPT

## 2022-07-17 PROCEDURE — 83735 ASSAY OF MAGNESIUM: CPT

## 2022-07-17 PROCEDURE — 96365 THER/PROPH/DIAG IV INF INIT: CPT

## 2022-07-17 PROCEDURE — 84484 ASSAY OF TROPONIN QUANT: CPT

## 2022-07-17 PROCEDURE — 6360000002 HC RX W HCPCS: Performed by: EMERGENCY MEDICINE

## 2022-07-17 PROCEDURE — 96374 THER/PROPH/DIAG INJ IV PUSH: CPT

## 2022-07-17 PROCEDURE — 83880 ASSAY OF NATRIURETIC PEPTIDE: CPT

## 2022-07-17 PROCEDURE — 87086 URINE CULTURE/COLONY COUNT: CPT

## 2022-07-17 PROCEDURE — A4216 STERILE WATER/SALINE, 10 ML: HCPCS | Performed by: EMERGENCY MEDICINE

## 2022-07-17 PROCEDURE — 96376 TX/PRO/DX INJ SAME DRUG ADON: CPT

## 2022-07-17 PROCEDURE — 80053 COMPREHEN METABOLIC PANEL: CPT

## 2022-07-17 PROCEDURE — 81001 URINALYSIS AUTO W/SCOPE: CPT

## 2022-07-17 PROCEDURE — 86160 COMPLEMENT ANTIGEN: CPT

## 2022-07-17 PROCEDURE — 2580000003 HC RX 258: Performed by: EMERGENCY MEDICINE

## 2022-07-17 RX ORDER — SODIUM CHLORIDE 9 MG/ML
INJECTION, SOLUTION INTRAVENOUS PRN
Status: DISCONTINUED | OUTPATIENT
Start: 2022-07-17 | End: 2022-07-18 | Stop reason: HOSPADM

## 2022-07-17 RX ORDER — METHYLPREDNISOLONE SODIUM SUCCINATE 125 MG/2ML
125 INJECTION, POWDER, LYOPHILIZED, FOR SOLUTION INTRAMUSCULAR; INTRAVENOUS ONCE
Status: COMPLETED | OUTPATIENT
Start: 2022-07-17 | End: 2022-07-17

## 2022-07-17 RX ORDER — METHYLPREDNISOLONE SODIUM SUCCINATE 40 MG/ML
40 INJECTION, POWDER, LYOPHILIZED, FOR SOLUTION INTRAMUSCULAR; INTRAVENOUS EVERY 12 HOURS
Status: DISCONTINUED | OUTPATIENT
Start: 2022-07-17 | End: 2022-07-18 | Stop reason: HOSPADM

## 2022-07-17 RX ORDER — FAMOTIDINE 20 MG/1
20 TABLET, FILM COATED ORAL 2 TIMES DAILY
Status: DISCONTINUED | OUTPATIENT
Start: 2022-07-17 | End: 2022-07-18 | Stop reason: HOSPADM

## 2022-07-17 RX ORDER — POLYETHYLENE GLYCOL 3350 17 G/17G
17 POWDER, FOR SOLUTION ORAL DAILY PRN
Status: DISCONTINUED | OUTPATIENT
Start: 2022-07-17 | End: 2022-07-18 | Stop reason: HOSPADM

## 2022-07-17 RX ORDER — HYDRALAZINE HYDROCHLORIDE 25 MG/1
25 TABLET, FILM COATED ORAL EVERY 8 HOURS PRN
Status: DISCONTINUED | OUTPATIENT
Start: 2022-07-17 | End: 2022-07-18 | Stop reason: HOSPADM

## 2022-07-17 RX ORDER — SULFAMETHOXAZOLE AND TRIMETHOPRIM 800; 160 MG/1; MG/1
1 TABLET ORAL EVERY 12 HOURS SCHEDULED
Status: DISCONTINUED | OUTPATIENT
Start: 2022-07-17 | End: 2022-07-18 | Stop reason: HOSPADM

## 2022-07-17 RX ORDER — HYDRALAZINE HYDROCHLORIDE 25 MG/1
25 TABLET, FILM COATED ORAL EVERY 8 HOURS SCHEDULED
Status: DISCONTINUED | OUTPATIENT
Start: 2022-07-17 | End: 2022-07-17

## 2022-07-17 RX ORDER — ACETAMINOPHEN 325 MG/1
650 TABLET ORAL EVERY 6 HOURS PRN
Status: DISCONTINUED | OUTPATIENT
Start: 2022-07-17 | End: 2022-07-18 | Stop reason: HOSPADM

## 2022-07-17 RX ORDER — HYDROCHLOROTHIAZIDE 25 MG/1
25 TABLET ORAL EVERY MORNING
Status: DISCONTINUED | OUTPATIENT
Start: 2022-07-18 | End: 2022-07-18 | Stop reason: HOSPADM

## 2022-07-17 RX ORDER — SODIUM CHLORIDE 0.9 % (FLUSH) 0.9 %
5-40 SYRINGE (ML) INJECTION EVERY 12 HOURS SCHEDULED
Status: DISCONTINUED | OUTPATIENT
Start: 2022-07-17 | End: 2022-07-18 | Stop reason: HOSPADM

## 2022-07-17 RX ORDER — ENOXAPARIN SODIUM 100 MG/ML
30 INJECTION SUBCUTANEOUS 2 TIMES DAILY
Status: DISCONTINUED | OUTPATIENT
Start: 2022-07-17 | End: 2022-07-18 | Stop reason: HOSPADM

## 2022-07-17 RX ORDER — ICATIBANT 30 MG/3ML
30 INJECTION, SOLUTION SUBCUTANEOUS ONCE
Status: COMPLETED | OUTPATIENT
Start: 2022-07-17 | End: 2022-07-17

## 2022-07-17 RX ORDER — POTASSIUM CHLORIDE 7.45 MG/ML
10 INJECTION INTRAVENOUS PRN
Status: DISCONTINUED | OUTPATIENT
Start: 2022-07-17 | End: 2022-07-18 | Stop reason: HOSPADM

## 2022-07-17 RX ORDER — ONDANSETRON 4 MG/1
4 TABLET, ORALLY DISINTEGRATING ORAL EVERY 8 HOURS PRN
Status: DISCONTINUED | OUTPATIENT
Start: 2022-07-17 | End: 2022-07-18 | Stop reason: HOSPADM

## 2022-07-17 RX ORDER — MAGNESIUM SULFATE 1 G/100ML
1000 INJECTION INTRAVENOUS PRN
Status: DISCONTINUED | OUTPATIENT
Start: 2022-07-17 | End: 2022-07-18 | Stop reason: HOSPADM

## 2022-07-17 RX ORDER — DIPHENHYDRAMINE HYDROCHLORIDE 50 MG/ML
50 INJECTION INTRAMUSCULAR; INTRAVENOUS ONCE
Status: COMPLETED | OUTPATIENT
Start: 2022-07-17 | End: 2022-07-17

## 2022-07-17 RX ORDER — ONDANSETRON 2 MG/ML
4 INJECTION INTRAMUSCULAR; INTRAVENOUS EVERY 6 HOURS PRN
Status: DISCONTINUED | OUTPATIENT
Start: 2022-07-17 | End: 2022-07-18 | Stop reason: HOSPADM

## 2022-07-17 RX ORDER — SODIUM CHLORIDE 0.9 % (FLUSH) 0.9 %
10 SYRINGE (ML) INJECTION PRN
Status: DISCONTINUED | OUTPATIENT
Start: 2022-07-17 | End: 2022-07-18 | Stop reason: HOSPADM

## 2022-07-17 RX ORDER — DIPHENHYDRAMINE HYDROCHLORIDE 50 MG/ML
12.5 INJECTION INTRAMUSCULAR; INTRAVENOUS EVERY 6 HOURS
Status: DISCONTINUED | OUTPATIENT
Start: 2022-07-17 | End: 2022-07-18 | Stop reason: HOSPADM

## 2022-07-17 RX ORDER — ACETAMINOPHEN 650 MG/1
650 SUPPOSITORY RECTAL EVERY 6 HOURS PRN
Status: DISCONTINUED | OUTPATIENT
Start: 2022-07-17 | End: 2022-07-18 | Stop reason: HOSPADM

## 2022-07-17 RX ORDER — POTASSIUM CHLORIDE 20 MEQ/1
40 TABLET, EXTENDED RELEASE ORAL PRN
Status: DISCONTINUED | OUTPATIENT
Start: 2022-07-17 | End: 2022-07-18 | Stop reason: HOSPADM

## 2022-07-17 RX ADMIN — TRANEXAMIC ACID 1000 MG: 1 INJECTION, SOLUTION INTRAVENOUS at 10:55

## 2022-07-17 RX ADMIN — METHYLPREDNISOLONE SODIUM SUCCINATE 125 MG: 125 INJECTION, POWDER, FOR SOLUTION INTRAMUSCULAR; INTRAVENOUS at 10:38

## 2022-07-17 RX ADMIN — ENOXAPARIN SODIUM 30 MG: 100 INJECTION SUBCUTANEOUS at 21:51

## 2022-07-17 RX ADMIN — DIPHENHYDRAMINE HYDROCHLORIDE 50 MG: 50 INJECTION, SOLUTION INTRAMUSCULAR; INTRAVENOUS at 10:37

## 2022-07-17 RX ADMIN — SODIUM CHLORIDE, PRESERVATIVE FREE 10 ML: 5 INJECTION INTRAVENOUS at 21:51

## 2022-07-17 RX ADMIN — FAMOTIDINE 20 MG: 10 INJECTION, SOLUTION INTRAVENOUS at 10:38

## 2022-07-17 RX ADMIN — FAMOTIDINE 20 MG: 20 TABLET, FILM COATED ORAL at 21:52

## 2022-07-17 RX ADMIN — METHYLPREDNISOLONE SODIUM SUCCINATE 40 MG: 40 INJECTION, POWDER, FOR SOLUTION INTRAMUSCULAR; INTRAVENOUS at 21:49

## 2022-07-17 RX ADMIN — DIPHENHYDRAMINE HYDROCHLORIDE 12.5 MG: 50 INJECTION, SOLUTION INTRAMUSCULAR; INTRAVENOUS at 21:50

## 2022-07-17 RX ADMIN — DIPHENHYDRAMINE HYDROCHLORIDE 12.5 MG: 50 INJECTION, SOLUTION INTRAMUSCULAR; INTRAVENOUS at 16:56

## 2022-07-17 RX ADMIN — SULFAMETHOXAZOLE AND TRIMETHOPRIM 1 TABLET: 800; 160 TABLET ORAL at 21:54

## 2022-07-17 RX ADMIN — ICATIBANT 30 MG: 10 INJECTION, SOLUTION SUBCUTANEOUS at 10:56

## 2022-07-17 RX ADMIN — EPINEPHRINE 0.3 MG: 1 INJECTION INTRAMUSCULAR; INTRAVENOUS; SUBCUTANEOUS at 10:40

## 2022-07-17 ASSESSMENT — ENCOUNTER SYMPTOMS
COLOR CHANGE: 1
VOMITING: 0
FACIAL SWELLING: 1
PHOTOPHOBIA: 0
CONSTIPATION: 0
SHORTNESS OF BREATH: 1
NAUSEA: 0
SINUS PAIN: 0
DIARRHEA: 0
SINUS PRESSURE: 0
WHEEZING: 0
SHORTNESS OF BREATH: 0
COUGH: 0
ABDOMINAL PAIN: 0

## 2022-07-17 ASSESSMENT — PAIN - FUNCTIONAL ASSESSMENT: PAIN_FUNCTIONAL_ASSESSMENT: 0-10

## 2022-07-17 ASSESSMENT — PAIN SCALES - GENERAL: PAINLEVEL_OUTOF10: 6

## 2022-07-17 NOTE — PROGRESS NOTES
Pt admitted to room per w/c in good condition from ED. Oriented to room and surroundings  Bed in lowest position, wheels locked, 2/4 side rails up  Call light in reach, room free of clutter, adequate lighting provided  Denies any further questions at this time  Instructed to call out with any questions/concerns/new onset of pain and/or n/v   White board updated  Continue to monitor with hourly rounding  Non-skid socks on/at bedside  [address] in place for patient/family to view & ask questions.

## 2022-07-17 NOTE — ED PROVIDER NOTES
[facility]  Emergency Department Encounter     Pt Name: Carissa Morales  MRN: [id number]  [place] 1955  Date of evaluation: 7/17/22  PCP:  Bonnie Wallace MD    [address] [place] [address]       Chief Complaint   Patient presents with    Allergic Reaction     Pt reports facial swelling since 2 am.  Pt states that he is also having SOB       HISTORY OF PRESENT ILLNESS  (Location/Symptom, Timing/Onset, Context/Setting, Quality, Duration, Modifying Factors, Severity.)    Carissa Morales is a 73 y.o. male who presents with progressively worsening lip swelling, tongue swelling that is been going on since around 10 PM last night. Patient states he is also been short of breath. He states that this is happened to him multiple times in the past and his talk to his family doctor who is refused to refer him to a allergist and has told him \"that it is just in his head \". He is currently on prednisone and taking his hydrochlorothiazide as directed. No other new medications or changes in medications. No changes in any hygiene products. Has not been around anybody with a rash or any other type of reaction. PAST MEDICAL / SURGICAL / SOCIAL / FAMILY HISTORY    has a past medical history of Hyperlipidemia, Hypertension, and PSA elevation. has a past surgical history that includes Prostate biopsy and Prostate biopsy (N/A, 6/2/2021).     Social History     Socioeconomic History    Marital status: Single     Spouse name: Not on file    Number of children: Not on file    Years of education: Not on file    Highest education level: Not on file   Occupational History    Not on file   Tobacco Use    Smoking status: Never    Smokeless tobacco: Never   Substance and Sexual Activity    Alcohol use: No    Drug use: Never    Sexual activity: Not on file   Other Topics Concern    Not on file   Social History Narrative    Not on file     Social Determinants of Health     Financial Resource Strain: Unknown    Difficulty of Paying Living Expenses: Patient refused   Food Insecurity: Unknown    Worried About Running Out of Food in the Last Year: Patient refused    Ran Out of Food in the Last Year: Patient refused   Transportation Needs: Not on file   Physical Activity: Not on file   Stress: Not on file   Social Connections: Not on file   Intimate Partner Violence: Not on file   Housing Stability: Not on file       History reviewed. No pertinent family history. Allergies:    Coconut oil and Pcn [penicillins]    Home Medications:  Prior to Admission medications    Medication Sig Start Date End Date Taking? Authorizing Provider   hydroCHLOROthiazide (HYDRODIURIL) 25 MG tablet Take 1 tablet by mouth every morning 7/8/22   Jose Crowe MD   lubiprostone (AMITIZA) 8 MCG CAPS capsule Take 1 capsule by mouth daily 8/20/21   Jose Crowe MD   VITAMIN E PO Take 1 tablet by mouth daily     Historical Provider, MD   Cyanocobalamin (VITAMIN B 12 PO) Take 1 tablet by mouth     Historical Provider, MD   VITAMIN D-3 (COLECALCIFEROL) 400 UNITS TABS Take by mouth daily     Historical Provider, MD       REVIEW OF SYSTEMS    (2-9 systems for level 4, 10 or more for level 5)    Review of Systems   Unable to perform ROS: Acuity of condition   HENT:  Positive for facial swelling. Respiratory:  Positive for shortness of breath. Skin:  Positive for color change. PHYSICAL EXAM   (up to 7 for level 4, 8 or more for level 5)    VITALS:   Vitals:    07/17/22 1031 07/17/22 1042 07/17/22 1050 07/17/22 1057   BP: (!) 197/103 (!) 165/103 (!) 158/88 (!) 156/86   Pulse: (!) 105 96 96 96   Resp: 20 14 20 17   Temp: 98.8 °F (37.1 °C)      TempSrc: Oral      SpO2: 97% 98% 94% 94%   Weight: (!) 310 lb (140.6 kg)      Height: 5' 11\" (1.803 m)          Physical Exam  Vitals and nursing note reviewed. Constitutional:       General: He is not in acute distress. Appearance: He is well-developed. He is obese.  He is not LABS:  Labs Reviewed   CBC WITH AUTO DIFFERENTIAL - Abnormal; Notable for the following components:       Result Value    WBC 15.9 (*)     Hematocrit 51.4 (*)     Seg Neutrophils 77 (*)     Lymphocytes 18 (*)     Eosinophils % 0 (*)     Immature Granulocytes 1 (*)     Segs Absolute 12.11 (*)     All other components within normal limits   COMPREHENSIVE METABOLIC PANEL W/ REFLEX TO MG FOR LOW K - Abnormal; Notable for the following components:    Glucose 155 (*)     Potassium 3.5 (*)     Total Bilirubin 1.38 (*)     All other components within normal limits   TROPONIN   BRAIN NATRIURETIC PEPTIDE   MAGNESIUM   C1 ESTERASE INHIBITOR PANEL       RADIOLOGY:  XR CHEST 1 VIEW    Result Date: 7/17/2022  EXAMINATION: ONE XRAY VIEW OF THE CHEST 7/17/2022 10:51 am COMPARISON: None. HISTORY: ORDERING SYSTEM PROVIDED HISTORY: allergic rxn SOB TECHNOLOGIST PROVIDED HISTORY: allergic rxn SOB Reason for Exam: Pt states allergic reaction pta. Visible swelling. AP UPRIGHT PORTABLE FINDINGS: Heart size is mildly prominent. Overlying soft tissue attenuation is limiting assessment of the lung bases. No significant vascular congestion, focal airspace consolidation, pneumothorax, or pleural effusion. No free air beneath the diaphragm. Mildly limited by overlying soft tissue attenuation. No evidence of acute process.       EKG    EKG Interpretation    Interpreted by emergency department physician    Rhythm: sinus tachycardia  Rate: 100-110  Axis: normal  Ectopy: none  Conduction: normal  ST Segments: no acute change  T Waves: no acute change  Q Waves: none    Clinical Impression: non-specific EKG    All EKG's are interpreted by the Emergency Department Physician whoeither signs or Co-signs this chart in the absence of a cardiologist.    EMERGENCY DEPARTMENT COURSE:  ED Course as of 07/17/22 1203   Sun Jul 17, 2022   1043 CBC with Auto Differential(!):    WBC 15.9(!)   RBC 5.55   Hemoglobin Quant 16.7   Hematocrit 51.4(!)   MCV 92.6   MCH 30.1   MCHC 32.5   RDW 12.4   Platelet Count 569   MPV 10.5   NRBC Automated 0.0   Seg Neutrophils 77(!)   Lymphocytes 18(!)   Monocytes 4   Eosinophils % 0(!)   Basophils 0   Immature Granulocytes 1(!)   Segs Absolute 12.11(!)   Absolute Lymph # 2.88   Absolute Mono # 0.70   Absolute Eos # <0.03   Basophils Absolute 0.04   Absolute Immature Granulocyte 0.10  Has been on prednisone [AO]   1106 Comprehensive Metabolic Panel w/ Reflex to MG(!):    GLUCOSE, FASTING,(!)   BUN,BUNPL 9   Creatinine 0.98   Bun/Cre Ratio 9   CALCIUM, SERUM, 988243 9.0   Sodium 138   Potassium PENDING   Chloride 98   CO2 27   Anion Gap 13   Alk Phos 104   ALT 19   AST 14   Bilirubin 1.38(!)   Total Protein 7.8   Albumin 4.1   GFR Non- >60   GFR  >60   GFR Comment      [AO]   1109 Brain Natriuretic Peptide:    Pro-BNP 32 [AO]   1110 Troponin:    Troponin, High Sensitivity 7 [AO]   1140 XR CHEST 1 VIEW [AO]   1150 Pt lips slightly less swollen however tongue is still significantly enlarged and his voice is still muffled.  [AO]   1201 Intermed accepted  [AO]      ED Course User Index  [AO] Neillemya Erps, DO     MDM  Number of Diagnoses or Management Options  Angioedema, initial encounter  Diagnosis management comments: Presents emergency department with facial swelling has been progressively worsening since around 10 PM last night. However patient states this is happened multiple times in the past as primary care doctor just told him it was \"in his head \"and he has been put on oral steroids. Has never had a formal work-up or seen a specialist for any of this. Initial evaluation he has lip swelling, tongue swelling, muffled voice. No drooling. Maintaining his oxygenation status appropriately. Given multiple medications with only minimal improvement of the lip swelling. Metabolic work-up unremarkable, C1 esterase inhibitor panel sent.   Plan for admission to the hospital for monitoring of swelling and monitoring of airway, coordination of care. Amount and/or Complexity of Data Reviewed  Clinical lab tests: ordered and reviewed  Tests in the radiology section of CPT®: ordered and reviewed  Review and summarize past medical records: yes  Discuss the patient with other providers: yes  Independent visualization of images, tracings, or specimens: yes    Critical Care  Total time providing critical care: < 30 minutes    Patient Progress  Patient progress: stable      PROCEDURES:  Procedures     CONSULTS:  IP CONSULT TO HOSPITALIST    CRITICAL CARE:  There was significant risk of life threatening deterioration of patient's condition requiring my direct management. Critical care time 27 minutes, excluding any documented procedures. FINAL IMPRESSION     1. Angioedema, initial encounter       DISPOSITION / PLAN   DISPOSITION Decision To Admit 07/17/2022 11:43:14 AM      Lizy Del Cid DO  Emergency Medicine Physician    (Please note that portions of this note were completed with a voice recognition program.  Efforts were made to edit the dictations but occasionally words are mis-transcribed.)        Haile Amaya DO  07/17/22 0848

## 2022-07-17 NOTE — ED NOTES
ED to inpatient nurses report     Chief Complaint   Patient presents with    Allergic Reaction     Pt reports facial swelling since 2 am.  Pt states that he is also having SOB      Present to ED from home  LOC: alert and orientated to name, place, date  Vital signs   Vitals:    07/17/22 1057 07/17/22 1157 07/17/22 1227 07/17/22 1330   BP: (!) 156/86 (!) 157/85 (!) 169/77 (!) 162/86   Pulse: 96 100 100 99   Resp: 17 17 22 21   Temp:       TempSrc:       SpO2: 94% 95% 93% 93%   Weight:       Height:          Oxygen Baseline 94    Current needs required O2 at 2 liters NC while sleeping   LDAs:   Peripheral IV 07/17/22 Left Antecubital (Active)   Site Assessment Clean, dry & intact 07/17/22 1034   Line Status Blood return noted;Brisk blood return;Flushed;Specimen collected 07/17/22 1034   R [address] changed 07/17/22 1034   Phlebitis Assessment No symptoms 07/17/22 1034   Infiltration Assessment 0 07/17/22 1034     Mobility: Independent  Fall Risk:    Pending ED orders: admission  Present condition: improved  Code Status: full  Consults: IP CONSULT TO HOSPITALIST  []  Hospitalist  Completed  [] yes [] no Who:   [x]  Medicine  Completed  [] yes [] No Who:   []  Cardiology  Completed  [] yes [] No Who:   []  GI   Completed  [] yes [] No Who:   []  Neurology  Completed  [] yes [] No Who:   []  Nephrology Completed  [] yes [] No Who:    []  Vascular  Completed  [] yes [] No Who:   []  Ortho  Completed  [] yes [] No Who:     []  Surgery  Completed  [] yes [] No Who:    []  Urology  Completed  [] yes [] No Who:    []  CT Surgery Completed  [] yes [] No Who:   []  Podiatry  Completed  [] yes [] No Who:    []  Other    Completed  [] yes [] No Who:  Interventions: IV, labs, meds, telemetry. Important Events: Pt has had approx 20 episodes of tongue swelling & swollen lips. Today is 1st time coming to ED.          Electronically signed by Ricky Butler RN on 7/17/2022 at 2:24 PM     Avtar Quan RN  07/17/22 9592

## 2022-07-17 NOTE — H&P
[facility]  Office: [address], DO, Cannon Dominick, DO, Tiffany Dooley, DO, Haileyden Maharaj Blood, DO, Elizabeth Melgar MD, Quentin Sheth MD, Jarod Matos MD, Merari Orellana MD, Penelope Wilkerson MD, Mary Ochoa MD, Jaclyn Kramer MD, Jose Luis Max DO, Shelbi Cook MD,  Kirill Morrison DO, Poonam Barrera MD, Marline Vaca MD, Jose M Aguilar DO, Ze Begum MD, Brigid Sands MD, Jennifer Sommers DO, Harsh Nelson MD, Jania Salgado MD, Yolanda Morton, [facility], [facility], CNP, Taina Watts, CNP, Tyesha Bhatti, CNP, Kayla Dwo, CNP, Manas Daley, CNP, Anjel Hollingsworth PA-C, Cleone Denver, DNP, Dolores Sharp, [facility], Jaziel Abreu, CNP, Ashleigh Jeffers, CNP, Panchito Crystal, CNS, Juan Pablo Busch, [facility], [place] Rajan, CNP, Zeeshan Addison, CNP, Kei Luke, [address]    HISTORY AND PHYSICAL EXAMINATION            Date:   7/17/2022  Patient name:  Sourav Lu  Date of admission:  7/17/2022 10:29 AM  MRN:   [id number]  Account:  [de-identified]  YOB: 1955  PCP:    Nida Orona MD  Room:   2011/2011-02  Code Status:    Full Code    Chief Complaint:     Chief Complaint   Patient presents with    Allergic Reaction     Pt reports facial swelling since 2 am.  Pt states that he is also having SOB       History Obtained From:     patient    History of Present Illness:     Sourav Lu is a 73 y.o. Non- / non  male who presents with Allergic Reaction (Pt reports facial swelling since 2 am.  Pt states that he is also having SOB)   and is admitted to the hospital for the management of Angio-edema. Patient reports to the emergency department with severe facial swelling and symptoms consistent with angioedema. Patient reports that he has had between 25 and 30 episodes of angioedema over the past 24 months.   Patient reports that he was having difficulty swallowing his own saliva and he therefore recognizes has a severe reaction and came to the emergency department. Patient has been hospitalized several times for angioedema but has not had a full angioedema work-up as an outpatient. Patient was treated very aggressively in the emergency department where he received Benadryl, high intensity Solu-Medrol, TXA, as well as Firazyr. This was successful at stabilizing his condition. At the time my exam patient is resting comfortably without respiratory distress and has regained his ability to swallow. Patient remains with edema to the soft tissues surrounding his adenoids and his lips continue to be swollen. Patient self reports that this is dramatically improved from this morning. Interview with the patient reveals that he does not take any ACE inhibitor's. He reports that he was started on hydrochlorothiazide approximately 7 months ago. Literature review findings highly infrequent episodes of hydrochlorothiazide induced angioedema but only in the presence of patients with sulfa allergies. Patient denies any allergies to sulfa. Long discussion is held with the patient and it is revealed that he was started on amitiza approximately 2 years ago for IBS induced constipation. After long discussion with the patient it is revealed his recurrent angioedema started to develop around the same time. Initially patient did not make the correlation as he assumes this was in the same medication as over-the-counter laxatives that he had taken for many decades as needed. Literature review is completed and Vero Gomez is known to cause angioedema in patients who are predisposed to hereditary angioedema. This diagnosis will obviously need to be confirmed with outpatient diagnostics with ENT/allergist.  Further interview with the patient reveals that he did take what he thought was a laxative last night several hours before his angioedema developed.   After brief investigation it is confirmed that this was Amitiza. Past Medical History:     Past Medical History:   Diagnosis Date    Hyperlipidemia     Hypertension     PSA elevation         Past Surgical History:     Past Surgical History:   Procedure Laterality Date    PROSTATE BIOPSY      PROSTATE BIOPSY N/A 6/2/2021    PROSTATE BIOPSY WITH ULTRASOUND, OFFICE NOTIFYING ULTRASOUND performed by Cesia Witt MD at [place] [address]        Medications Prior to Admission:     Prior to Admission medications    Medication Sig Start Date End Date Taking? Authorizing Provider   hydroCHLOROthiazide (HYDRODIURIL) 25 MG tablet Take 1 tablet by mouth every morning 7/8/22   Bud Rocha MD   lubiprostone (AMITIZA) 8 MCG CAPS capsule Take 1 capsule by mouth daily  Patient not taking: Reported on 7/17/2022 8/20/21 7/17/22  Bud Rocha MD   VITAMIN E PO Take 1 tablet by mouth daily     Historical Provider, MD   Cyanocobalamin (VITAMIN B 12 PO) Take 1 tablet by mouth     Historical Provider, MD   VITAMIN D-3 (COLECALCIFEROL) 400 UNITS TABS Take by mouth daily     Historical Provider, MD        Allergies:     Coconut oil and Pcn [penicillins]    Social History:     Tobacco:    reports that he has never smoked. He has never used smokeless tobacco.  Alcohol:      reports no history of alcohol use. Drug Use:  reports no history of drug use. Family History:     Family History   Problem Relation Age of Onset    Diabetes Mother     Diabetes Father        Review of Systems:     Positive and Negative as described in HPI. Review of Systems   Constitutional:  Negative for activity change, chills, fatigue and fever. HENT:  Positive for drooling and facial swelling. Negative for sinus pressure and sinus pain. Eyes:  Negative for photophobia and visual disturbance. Respiratory:  Negative for cough, shortness of breath and wheezing. Cardiovascular: Negative. Negative for chest pain, palpitations and leg swelling.    Gastrointestinal:  Negative for abdominal pain, constipation, diarrhea, nausea and vomiting. Endocrine: Negative for cold intolerance, heat intolerance and polyuria. Genitourinary:  Negative for difficulty urinating and urgency. Musculoskeletal:  Negative for arthralgias and myalgias. Skin: Negative. Negative for wound. Neurological:  Negative for dizziness, syncope, weakness and light-headedness. Hematological:  Negative for adenopathy. Does not bruise/bleed easily. Psychiatric/Behavioral:  Negative for agitation and confusion. The patient is not nervous/anxious. Physical Exam:   BP (!) 163/85   Pulse (!) 113   Temp 97.4 °F (36.3 °C) (Oral)   Resp 16   Ht 5' 11\" (1.803 m)   Wt (!) 310 lb (140.6 kg)   SpO2 100%   BMI 43.24 kg/m²   Temp (24hrs), Av.9 °F (36.6 °C), Min:97.4 °F (36.3 °C), Max:98.8 °F (37.1 °C)    No results for input(s): POCGLU in the last 72 hours. No intake or output data in the 24 hours ending 22 1603    Physical Exam  Constitutional:       General: He is not in acute distress. Appearance: Normal appearance. He is normal weight. He is not toxic-appearing. HENT:      Head: Normocephalic and atraumatic. Jaw: Swelling present. Salivary Glands: Right salivary gland is diffusely enlarged and tender. Left salivary gland is diffusely enlarged and tender. Mouth/Throat:      Mouth: Mucous membranes are dry. Eyes:      Extraocular Movements: Extraocular movements intact. Pupils: Pupils are equal, round, and reactive to light. Cardiovascular:      Rate and Rhythm: Normal rate and regular rhythm. Pulses: Normal pulses. Heart sounds: Normal heart sounds. No murmur heard. Pulmonary:      Effort: Pulmonary effort is normal. No respiratory distress. Abdominal:      General: Abdomen is flat. Bowel sounds are normal. There is no distension. Palpations: Abdomen is soft. Tenderness: There is no abdominal tenderness.    Musculoskeletal:         General: No swelling or tenderness. Normal range of motion. Cervical back: Normal range of motion and neck supple. Skin:     General: Skin is warm and dry. Capillary Refill: Capillary refill takes less than 2 seconds. Coloration: Skin is not pale. Neurological:      General: No focal deficit present. Mental Status: He is alert and oriented to person, place, and time. Mental status is at baseline. Psychiatric:         Mood and Affect: Mood normal.         Behavior: Behavior normal.         Thought Content:  Thought content normal.         Judgment: Judgment normal.       Investigations:      Laboratory Testing:  Recent Results (from the past 24 hour(s))   CBC with Auto Differential    Collection Time: 07/17/22 10:35 AM   Result Value Ref Range    WBC 15.9 (H) 3.5 - 11.3 k/uL    RBC 5.55 4.21 - 5.77 m/uL    Hemoglobin 16.7 13.0 - 17.0 g/dL    Hematocrit 51.4 (H) 40.7 - 50.3 %    MCV 92.6 82.6 - 102.9 fL    MCH 30.1 25.2 - 33.5 pg    MCHC 32.5 28.4 - 34.8 g/dL    RDW 12.4 11.8 - 14.4 %    Platelets 522 922 - 571 k/uL    MPV 10.5 8.1 - 13.5 fL    NRBC Automated 0.0 0.0 per 100 WBC    Seg Neutrophils 77 (H) 36 - 65 %    Lymphocytes 18 (L) 24 - 43 %    Monocytes 4 3 - 12 %    Eosinophils % 0 (L) 1 - 4 %    Basophils 0 0 - 2 %    Immature Granulocytes 1 (H) 0 %    Segs Absolute 12.11 (H) 1.50 - 8.10 k/uL    Absolute Lymph # 2.88 1.10 - 3.70 k/uL    Absolute Mono # 0.70 0.10 - 1.20 k/uL    Absolute Eos # <0.03 0.00 - 0.44 k/uL    Basophils Absolute 0.04 0.00 - 0.20 k/uL    Absolute Immature Granulocyte 0.10 0.00 - 0.30 k/uL   Comprehensive Metabolic Panel w/ Reflex to MG    Collection Time: 07/17/22 10:35 AM   Result Value Ref Range    Glucose 155 (H) 70 - 99 mg/dL    BUN 9 8 - 23 mg/dL    CREATININE 0.98 0.70 - 1.20 mg/dL    Bun/Cre Ratio 9 9 - 20    Calcium 9.0 8.6 - 10.4 mg/dL    Sodium 138 135 - 144 mmol/L    Potassium 3.5 (L) 3.7 - 5.3 mmol/L    Chloride 98 98 - 107 mmol/L    CO2 27 20 - 31 mmol/L    Anion Gap 13 9 - 17 mmol/L    Alkaline Phosphatase 104 40 - 129 U/L    ALT 19 5 - 41 U/L    AST 14 <40 U/L    Total Bilirubin 1.38 (H) 0.3 - 1.2 mg/dL    Total Protein 7.8 6.4 - 8.3 g/dL    Albumin 4.1 3.5 - 5.2 g/dL    GFR Non-African American >60 >60 mL/min    GFR African American >60 >60 mL/min    GFR Comment         Troponin    Collection Time: 07/17/22 10:35 AM   Result Value Ref Range    Troponin, High Sensitivity 7 0 - 22 ng/L   Brain Natriuretic Peptide    Collection Time: 07/17/22 10:35 AM   Result Value Ref Range    Pro-BNP 32 <300 pg/mL   Magnesium    Collection Time: 07/17/22 10:35 AM   Result Value Ref Range    Magnesium 2.1 1.6 - 2.6 mg/dL   EKG 12 Lead    Collection Time: 07/17/22 10:42 AM   Result Value Ref Range    Ventricular Rate 100 BPM    Atrial Rate 100 BPM    P-R Interval 166 ms    QRS Duration 90 ms    Q-T Interval 376 ms    QTc Calculation (Bazett) 485 ms    P Axis 44 degrees    R Axis -15 degrees    T Axis 3 degrees   Urinalysis with Reflex to Culture    Collection Time: 07/17/22  3:30 PM    Specimen: Urine, clean catch   Result Value Ref Range    Color, UA Yellow Yellow    Turbidity UA SLIGHTLY CLOUDY (A) Clear    Glucose, Ur NEGATIVE NEGATIVE    Bilirubin Urine NEGATIVE NEGATIVE    Ketones, Urine 1+ (A) NEGATIVE    Specific Gravity, UA 1.020 1.005 - 1.030    Urine Hgb NEGATIVE NEGATIVE    pH, UA 6.0 5.0 - 8.0    Protein, UA NEGATIVE NEGATIVE    Urobilinogen, Urine Normal Normal    Nitrite, Urine POSITIVE (A) NEGATIVE    Leukocyte Esterase, Urine SMALL (A) NEGATIVE   Microscopic Urinalysis    Collection Time: 07/17/22  3:30 PM   Result Value Ref Range    -          WBC, UA 10 TO 20 0 - 5 /HPF    RBC, UA None 0 - 2 /HPF    Epithelial Cells UA 2 TO 5 0 - 5 /HPF       Imaging/Diagnostics:  XR CHEST 1 VIEW    Result Date: 7/17/2022  Mildly limited by overlying soft tissue attenuation. No evidence of acute process.        Assessment :      Hospital Problems             Last Modified POA    * (Principal) Angio-edema 7/17/2022 Yes    Hypertension 7/17/2022 Yes    Irritable bowel syndrome with constipation 7/17/2022 Yes       Plan:     Patient status inpatient in the  Progressive Unit/Step down    Recurrent angioedema of unknown cause  Aggressive therapy in the emergency department with successful at stabilizing patient's condition  Continue Solu-Medrol twice daily  Continue scheduled Benadryl  Airway monitoring, high risk airway if angioedema worsens, if respiratory distress develops strongly recommend early intubation by anesthesia if able  Hold Amitiza as angioedema episodes seem to correlate to its administration, strongly recommend follow-up with allergist as an outpatient to confirm diagnosis  Hypertension  Continue hydrochlorothiazide  As needed hydralazine for systolic greater than 394  IBS with C  Hold Amitiza    Consultations:   IP CONSULT TO HOSPITALIST      TOYIN Barkley NP  7/17/2022  4:03 PM    Copy sent to Dr. Ayanna Mora MD

## 2022-07-18 VITALS
DIASTOLIC BLOOD PRESSURE: 64 MMHG | RESPIRATION RATE: 16 BRPM | OXYGEN SATURATION: 96 % | SYSTOLIC BLOOD PRESSURE: 137 MMHG | HEIGHT: 71 IN | TEMPERATURE: 98.1 F | HEART RATE: 93 BPM | WEIGHT: 310.5 LBS | BODY MASS INDEX: 43.47 KG/M2

## 2022-07-18 PROBLEM — N39.0 URINARY TRACT INFECTION IN MALE: Status: ACTIVE | Noted: 2022-07-18

## 2022-07-18 LAB
ANION GAP SERPL CALCULATED.3IONS-SCNC: 10 MMOL/L (ref 9–17)
BUN BLDV-MCNC: 14 MG/DL (ref 8–23)
BUN/CREAT BLD: 15 (ref 9–20)
CALCIUM SERPL-MCNC: 8.8 MG/DL (ref 8.6–10.4)
CHLORIDE BLD-SCNC: 104 MMOL/L (ref 98–107)
CO2: 25 MMOL/L (ref 20–31)
CREAT SERPL-MCNC: 0.95 MG/DL (ref 0.7–1.2)
CULTURE: NORMAL
EKG ATRIAL RATE: 100 BPM
EKG P AXIS: 44 DEGREES
EKG P-R INTERVAL: 166 MS
EKG Q-T INTERVAL: 376 MS
EKG QRS DURATION: 90 MS
EKG QTC CALCULATION (BAZETT): 485 MS
EKG R AXIS: -15 DEGREES
EKG T AXIS: 3 DEGREES
EKG VENTRICULAR RATE: 100 BPM
GFR AFRICAN AMERICAN: >60 ML/MIN
GFR NON-AFRICAN AMERICAN: >60 ML/MIN
GFR SERPL CREATININE-BSD FRML MDRD: ABNORMAL ML/MIN/{1.73_M2}
GLUCOSE BLD-MCNC: 180 MG/DL (ref 70–99)
INR BLD: 1
POTASSIUM SERPL-SCNC: 3.6 MMOL/L (ref 3.7–5.3)
PROTHROMBIN TIME: 13.4 SEC (ref 11.5–14.2)
SODIUM BLD-SCNC: 139 MMOL/L (ref 135–144)
SPECIMEN DESCRIPTION: NORMAL

## 2022-07-18 PROCEDURE — G0378 HOSPITAL OBSERVATION PER HR: HCPCS

## 2022-07-18 PROCEDURE — 36415 COLL VENOUS BLD VENIPUNCTURE: CPT

## 2022-07-18 PROCEDURE — 80048 BASIC METABOLIC PNL TOTAL CA: CPT

## 2022-07-18 PROCEDURE — 93010 ELECTROCARDIOGRAM REPORT: CPT | Performed by: INTERNAL MEDICINE

## 2022-07-18 PROCEDURE — 96376 TX/PRO/DX INJ SAME DRUG ADON: CPT

## 2022-07-18 PROCEDURE — 2580000003 HC RX 258: Performed by: NURSE PRACTITIONER

## 2022-07-18 PROCEDURE — 99217 PR OBSERVATION CARE DISCHARGE MANAGEMENT: CPT | Performed by: NURSE PRACTITIONER

## 2022-07-18 PROCEDURE — 6360000002 HC RX W HCPCS: Performed by: NURSE PRACTITIONER

## 2022-07-18 PROCEDURE — 85610 PROTHROMBIN TIME: CPT

## 2022-07-18 PROCEDURE — 6370000000 HC RX 637 (ALT 250 FOR IP): Performed by: NURSE PRACTITIONER

## 2022-07-18 RX ORDER — SULFAMETHOXAZOLE AND TRIMETHOPRIM 800; 160 MG/1; MG/1
1 TABLET ORAL EVERY 12 HOURS SCHEDULED
Qty: 12 TABLET | Refills: 0 | Status: SHIPPED | OUTPATIENT
Start: 2022-07-18 | End: 2022-07-24

## 2022-07-18 RX ORDER — PREDNISONE 10 MG/1
10 TABLET ORAL 2 TIMES DAILY
Qty: 10 TABLET | Refills: 0 | Status: SHIPPED | OUTPATIENT
Start: 2022-07-18 | End: 2022-07-23

## 2022-07-18 RX ORDER — FAMOTIDINE 20 MG/1
20 TABLET, FILM COATED ORAL 2 TIMES DAILY
Qty: 60 TABLET | Refills: 3 | Status: SHIPPED | OUTPATIENT
Start: 2022-07-18

## 2022-07-18 RX ADMIN — FAMOTIDINE 20 MG: 20 TABLET, FILM COATED ORAL at 09:24

## 2022-07-18 RX ADMIN — DIPHENHYDRAMINE HYDROCHLORIDE 12.5 MG: 50 INJECTION, SOLUTION INTRAMUSCULAR; INTRAVENOUS at 09:25

## 2022-07-18 RX ADMIN — METHYLPREDNISOLONE SODIUM SUCCINATE 40 MG: 40 INJECTION, POWDER, FOR SOLUTION INTRAMUSCULAR; INTRAVENOUS at 09:24

## 2022-07-18 RX ADMIN — HYDROCHLOROTHIAZIDE 25 MG: 25 TABLET ORAL at 09:26

## 2022-07-18 RX ADMIN — SODIUM CHLORIDE, PRESERVATIVE FREE 10 ML: 5 INJECTION INTRAVENOUS at 09:24

## 2022-07-18 RX ADMIN — DIPHENHYDRAMINE HYDROCHLORIDE 12.5 MG: 50 INJECTION, SOLUTION INTRAMUSCULAR; INTRAVENOUS at 04:50

## 2022-07-18 RX ADMIN — SULFAMETHOXAZOLE AND TRIMETHOPRIM 1 TABLET: 800; 160 TABLET ORAL at 09:24

## 2022-07-18 NOTE — PLAN OF CARE
Problem: Discharge Planning  Goal: Discharge to home or other facility with appropriate resources  7/18/2022 1117 by Aparna Hatch RN  Outcome: Progressing Towards Goal  Flowsheets (Taken 7/18/2022 0920)  Discharge to home or other facility with appropriate resources:   Identify barriers to discharge with patient and caregiver   Arrange for needed discharge resources and transportation as appropriate   Identify discharge learning needs (meds, wound care, etc)   Refer to discharge planning if patient needs post-hospital services based on physician order or complex needs related to functional status, cognitive ability or social support system  7/18/2022 0555 by Nohemy Nicole, RN  Outcome: Progressing Towards Goal     Problem: Safety - Adult  Goal: Free from fall injury  7/18/2022 1117 by Aparna Hatch RN  Outcome: Progressing Towards Goal  7/18/2022 0555 by Nohemy Nicole, RN  Outcome: Progressing Towards Goal

## 2022-07-18 NOTE — PLAN OF CARE
Problem: Discharge Planning  Goal: Discharge to home or other facility with appropriate resources  7/18/2022 1117 by Humera Campos RN  Outcome: Progressing Towards Goal  Flowsheets (Taken 7/18/2022 0920)  Discharge to home or other facility with appropriate resources:   Identify barriers to discharge with patient and caregiver   Arrange for needed discharge resources and transportation as appropriate   Identify discharge learning needs (meds, wound care, etc)   Refer to discharge planning if patient needs post-hospital services based on physician order or complex needs related to functional status, cognitive ability or social support system  7/18/2022 0555 by Barrera Booth RN  Outcome: Progressing Towards Goal     Problem: Pain  Goal: Verbalizes/displays adequate comfort level or baseline comfort level  7/18/2022 1117 by Humera Campos RN  Outcome: Progressing Towards Goal  7/18/2022 0555 by Barrera Booth RN  Outcome: Progressing Towards Goal     Problem: Safety - Adult  Goal: Free from fall injury  7/18/2022 1117 by Humera Campos RN  Outcome: Progressing Towards Goal  7/18/2022 0555 by Barrera Booth RN  Outcome: Progressing Towards Goal

## 2022-07-18 NOTE — ACP (ADVANCE CARE PLANNING)
Advance Care Planning     Advance Care Planning Activator (Inpatient)  Conversation Note      Date of ACP Conversation: 7/18/2022     Conversation Conducted with: Patient with Decision Making Capacity    ACP Activator: Garnette Boast, RN    {When Decision Maker makes decisions on behalf of the incapacitated patient: Decision Maker is asked to consider and make decisions based on patient values, known preferences, or best interests. Health Care Decision Maker:     Current Designated Health Care Decision Maker:     Primary Decision Maker: Crystalfrancisco j Ramirez - Child - [phone]  Click here to complete Healthcare Decision Makers including section of the Healthcare Decision Maker Relationship (ie \"Primary\")      Care Preferences    Ventilation: \"If you were in your present state of health and suddenly became very ill and were unable to breathe on your own, what would your preference be about the use of a ventilator (breathing machine) if it were available to you? \"      Would the patient desire the use of ventilator (breathing machine)?: yes    \"If your health worsens and it becomes clear that your chance of recovery is unlikely, what would your preference be about the use of a ventilator (breathing machine) if it were available to you? \"     Would the patient desire the use of ventilator (breathing machine)?: No      Resuscitation  \"CPR works best to restart the heart when there is a sudden event, like a heart attack, in someone who is otherwise healthy. Unfortunately, CPR does not typically restart the heart for people who have serious health conditions or who are very sick. \"    \"In the event your heart stopped as a result of an underlying serious health condition, would you want attempts to be made to restart your heart (answer \"yes\" for attempt to resuscitate) or would you prefer a natural death (answer \"no\" for do not attempt to resuscitate)? \" yes       [] Yes   [x] No   Educated Patient / Lita Yoon

## 2022-07-18 NOTE — PROGRESS NOTES
[facility]  Office: [address], DO, Brandy Tyler, DO, Natty Alonso, DO, [facility] Blood, DO, Emiliano Pradhan MD, Maci Nelson MD, Adal Bill MD, Claudio Caceres MD, Ladonna Chanel MD, Carlyn Snell MD, Rhina Waller MD, Phyllis Anderson, DO, Nicole Ambriz MD,  Naren Shultz, DO, James Iirzarry MD, Kristina Reynolds MD, Reema Busby, DO, Kayy Dove MD, Lizette Purvis MD, Mariano Davis DO, Winnie Easley MD, Liane Waller MD, Brenda Perry, [facility], [facility], CNP, Azul Reyna, CNP, Marva Cummings, CNP, Bill Rosenberg, CNP, Delmy Pascal, CNP, Dorothy Maguire PA-C, Gallito Daniels, [facility], Rocio Cook, CNP, Ebenezer Arroyo, CNP, Festus Zaidi, CNP, Saray Adrian, CNS, Nenita Garcia, [facility], Destiny Fajardo, CNP, Makayla Cardenas, [facility], [facility], [facility]    Progress Note    7/18/2022    11:24 AM    Name:   Jas Miranda  MRN:     [id number]     [place]:      [de-identified]   Room:   2011/2011-02  IP Day:  0  Admit Date:  7/17/2022 10:29 AM    PCP:   Shanique King MD  Code Status:  Full Code    Subjective:     C/C:   Chief Complaint   Patient presents with    Allergic Reaction     Pt reports facial swelling since 2 am.  Pt states that he is also having SOB     Interval History Status: significantly improved. Significant improvement in condition overnight. Only mild facial swelling remains. Adenoids and tonsils are significantly improved from yesterday. Patient is highly motivated for discharge and risks and benefits are discussed with the patient. He has elected to pursue an outpatient treatment modality, internal medicine is comfortable with this. We strongly recommend he follow-up with an allergist and discontinue his Amitiza. Brief History:     7/17 - Patient reports to the emergency department with severe facial swelling and symptoms consistent with angioedema.   Patient reports that he has had between 25 and 30 episodes of angioedema over the past 24 months. Patient reports that he was having difficulty swallowing his own saliva and he therefore recognizes has a severe reaction and came to the emergency department. Patient has been hospitalized several times for angioedema but has not had a full angioedema work-up as an outpatient. Patient was treated very aggressively in the emergency department where he received Benadryl, high intensity Solu-Medrol, TXA, as well as Firazyr. This was successful at stabilizing his condition. At the time my exam patient is resting comfortably without respiratory distress and has regained his ability to swallow. Patient remains with edema to the soft tissues surrounding his adenoids and his lips continue to be swollen. Patient self reports that this is dramatically improved from this morning. 7/18 - Significant improvement in condition overnight. Only mild facial swelling remains. Adenoids and tonsils are significantly improved from yesterday. Patient is highly motivated for discharge and risks and benefits are discussed with the patient. He has elected to pursue an outpatient treatment modality, internal medicine is comfortable with this. We strongly recommend he follow-up with an allergist and discontinue his Amitiza. Review of Systems:     Constitutional:  negative for chills, fevers, sweats  Respiratory:  negative for cough, dyspnea on exertion, shortness of breath, wheezing  Cardiovascular:  negative for chest pain, chest pressure/discomfort, lower extremity edema, palpitations  Gastrointestinal:  negative for abdominal pain, constipation, diarrhea, nausea, vomiting  Neurological:  negative for dizziness, headache    Medications: Allergies:     Allergies   Allergen Reactions    Coconut Oil     Pcn [Penicillins]        Current Meds:   Scheduled Meds:    sodium chloride flush  5-40 mL IntraVENous 2 times per day    enoxaparin  30 mg SubCUTAneous BID    diphenhydrAMINE  12.5 mg IntraVENous Q6H    methylPREDNISolone  40 mg IntraVENous Q12H    famotidine  20 mg Oral BID    hydroCHLOROthiazide  25 mg Oral QAM    sulfamethoxazole-trimethoprim  1 tablet Oral 2 times per day     Continuous Infusions:    sodium chloride       PRN Meds: sodium chloride flush, sodium chloride, potassium chloride **OR** potassium alternative oral replacement **OR** potassium chloride, magnesium sulfate, ondansetron **OR** ondansetron, polyethylene glycol, acetaminophen **OR** acetaminophen, hydrALAZINE    Data:     Past Medical History:   has a past medical history of Hyperlipidemia, Hypertension, and PSA elevation. Social History:   reports that he has never smoked. He has never used smokeless tobacco. He reports that he does not drink alcohol and does not use drugs. Family History:   Family History   Problem Relation Age of Onset    Diabetes Mother     Diabetes Father        Vitals:  /64   Pulse 93   Temp 98.1 °F (36.7 °C) (Oral)   Resp 16   Ht 5' 11\" (1.803 m)   Wt (!) 310 lb 8 oz (140.8 kg)   SpO2 96%   BMI 43.31 kg/m²   Temp (24hrs), Av.9 °F (36.6 °C), Min:97.4 °F (36.3 °C), Max:98.2 °F (36.8 °C)    No results for input(s): POCGLU in the last 72 hours. I/O (24Hr):     Intake/Output Summary (Last 24 hours) at 2022 1124  Last data filed at 2022 0738  Gross per 24 hour   Intake 720 ml   Output 700 ml   Net 20 ml       Labs:  Hematology:  Recent Labs     22  1035 22  0537   WBC 15.9*  --    RBC 5.55  --    HGB 16.7  --    HCT 51.4*  --    MCV 92.6  --    MCH 30.1  --    MCHC 32.5  --    RDW 12.4  --      --    MPV 10.5  --    INR  --  1.0     Chemistry:  Recent Labs     22  1035 22  0537    139   K 3.5* 3.6*   CL 98 104   CO2 27 25   GLUCOSE 155* 180*   BUN 9 14   CREATININE 0.98 0.95   MG 2.1  --    ANIONGAP 13 10   LABGLOM >60 >60   GFRAA >60 >60   CALCIUM 9.0 8.8   PROBNP 32  --    TROPHS 7  -- Recent Labs     07/17/22  1035   PROT 7.8   LABALBU 4.1   AST 14   ALT 19   ALKPHOS 104   BILITOT 1.38*     ABG:No results found for: POCPH, PHART, PH, POCPCO2, NDW0QQA, PCO2, POCPO2, PO2ART, PO2, POCHCO3, NGK6FXG, HCO3, NBEA, PBEA, BEART, BE, THGBART, THB, KAJ6XEL, DHHN0CFL, N2VFBRJV, O2SAT, FIO2  Lab Results   Component Value Date/Time    SPECIAL NOT REPORTED 09/17/2021 11:20 AM     Lab Results   Component Value Date/Time    CULTURE NO SIGNIFICANT GROWTH 09/17/2021 11:20 AM       Radiology:  XR CHEST 1 VIEW    Result Date: 7/17/2022  Mildly limited by overlying soft tissue attenuation. No evidence of acute process.        Physical Examination:        General appearance:  alert, cooperative and no distress  Mental Status:  oriented to person, place and time and normal affect  Lungs:  clear to auscultation bilaterally, normal effort  Heart:  regular rate and rhythm, no murmur  Abdomen:  soft, nontender, nondistended, normal bowel sounds, no masses, hepatomegaly, splenomegaly  Extremities:  no edema, redness, tenderness in the calves  Skin:  no gross lesions, rashes, induration    Assessment:        Hospital Problems             Last Modified POA    * (Principal) Angio-edema 7/17/2022 Yes    Hypertension 7/17/2022 Yes    Irritable bowel syndrome with constipation 7/17/2022 Yes    Urinary tract infection in male 7/18/2022 Yes       Plan:        Angioedema  Transition to oral prednisone, Continue Pepcid,Stable for discharge, Follow-up with allergist as outpatient  Hold Amitiza as angioedema episodes seem to correlate to its administration, strongly recommend follow-up with allergist as an outpatient to confirm diagnosis  Hypertension  Continue hydrochlorothiazide  As needed hydralazine for systolic greater than 504  IBS with C  [address] APRN - NP  7/18/2022  11:24 AM

## 2022-07-18 NOTE — CARE COORDINATION
Case Management Initial Discharge Plan  Pierre Masters,             Met with:patient to discuss discharge plans. Information verified: address, contacts, phone number, , insurance Yes  PCP: Rita Polanco MD  Date of last visit: 2022    Insurance Provider: Medicare    Discharge Planning    Living Arrangements:  Spouse/Significant Other, Children   Support Systems:  Spouse/Significant Other, Children    Home has 1 stories  0 stairs to climb to get into front door - has ramp, 0 stairs to climb to reach second floor  Location of bedroom/bathroom in home  - main floor    Patient able to perform ADL's: Independent    Current Services (outpatient & in home) none  DME equipment: none  DME provider: N/A    Pharmacy: [facility]    Potential Assistance Needed:  N/A    Patient agreeable to home care: No  [place] of choice provided:  n/a    Prior SNF/Rehab Placement and Facility: No  Agreeable to SNF/Rehab: No  [place] of choice provided: n/a   Evaluation: n/a    Expected Discharge date:     Patient expects to be discharged to:   home  Follow Up Appointment: Best Day/ Time: Monday PM    Transportation provider: kenan  Transportation arrangements needed for discharge: No    Readmission Risk              Risk of Unplanned Readmission:  0             Does patient have a readmission risk score greater than 14?: No  If yes, follow-up appointment must be made within 7 days of discharge. Goal of Care:       Discharge Plan: Met with patient at bedside. Lives with girlfriend and is her caregiver since she has CP. Pt is independent, drives and works full time. Admitted for angio edema and UTI. Started to have tongue swelling  due to allergic reaction from medication. Has history of angio edema in the past.  Currently on IV Solu Medrol and Benedryl. Also on PO Bactrim for UTI. Plan is to follow up with allergist as OP.   Pt is hoping to D/C home today with no needs Electronically signed by Duane Jenkins RN on 7/18/22 at 9:27 AM EDT

## 2022-07-18 NOTE — PROGRESS NOTES
Physical Therapy  {PT ALL NOTES:008342}  Peg Dye is a 73 y.o. Non- / non  male who presents with Allergic Reaction (Pt reports facial swelling since 2 am.  Pt states that he is also having SOB)   and is admitted to the hospital for the management of Angio-edema.

## 2022-07-18 NOTE — DISCHARGE SUMMARY
[facility]  Office: [address], DO, Lin Sabra, DO, Ketty Bejarano, DO, Sanket Soria Blood, DO, Silvano Bernard MD, Liberty Malloy MD, Arline Pereyra MD, Amos Madrigal MD, Kellie Lang MD, Felipe Momin MD, Teresa Cash MD, Ramesh Blank, DO, Natalie Winn MD,  Hiro Marin, DO, Nicki Webb MD, Ely Webb MD, Charlie Vides, DO, Halley Gar MD, Abida Strong MD, Lindsay John DO, Michelle Whalen MD, Jessy Hawkins MD, Jeimy Mendoza, [facility], [facility], CNP, Darvin Ontiveros, [facility], Anthony Cline, [facility], Joleen Aguilera, [facility], Micky Mtz, CNP, Rowan Oppenheim, PA-C, Orlin Vines, [facility], Cate Simmons, CNP, Michelle Raymond, CNP, Claudeen Quarry, CNP, Rosa Burns, CNS, Delbert De Guzman, [facility], Linda Trimble, CNP, Miguel Ballesteros, CNP, Maxim Perry, [facility]    Discharge Summary     Patient ID: Florentin Molina  :  1955   MRN: [id number]     ACCOUNT:  [de-identified]   Patient's PCP: Donna Rios MD  Admit Date: 2022   Discharge Date: 2022     Length of Stay: 0  Code Status:  Full Code  Admitting Physician: Nelly Garcia MD  Discharge Physician: Adelaide Brittle, APRN - NP     Active Discharge Diagnoses:     Hospital Problem Lists:  Principal Problem:    Angio-edema  Active Problems:    Hypertension    Irritable bowel syndrome with constipation    Urinary tract infection in male  Resolved Problems:    * No resolved hospital problems. *      Admission Condition:  fair     Discharged Condition: good    Hospital Stay:     Hospital Course:  Florentin Molina is a 73 y.o. male who was admitted for the management of  Angio-edema , presented to ER with Allergic Reaction (Pt reports facial swelling since 2 am.  Pt states that he is also having SOB)       - Patient reports to the emergency department with severe facial swelling and symptoms consistent with angioedema.   Patient reports that he has had between 25 and 30 episodes of angioedema over the past 24 months. Patient reports that he was having difficulty swallowing his own saliva and he therefore recognizes has a severe reaction and came to the emergency department. Patient has been hospitalized several times for angioedema but has not had a full angioedema work-up as an outpatient. Patient was treated very aggressively in the emergency department where he received Benadryl, high intensity Solu-Medrol, TXA, as well as Firazyr. This was successful at stabilizing his condition. At the time my exam patient is resting comfortably without respiratory distress and has regained his ability to swallow. Patient remains with edema to the soft tissues surrounding his adenoids and his lips continue to be swollen. Patient self reports that this is dramatically improved from this morning. 7/18 - Significant improvement in condition overnight. Only mild facial swelling remains. Adenoids and tonsils are significantly improved from yesterday. Patient is highly motivated for discharge and risks and benefits are discussed with the patient. He has elected to pursue an outpatient treatment modality, internal medicine is comfortable with this. We strongly recommend he follow-up with an allergist and discontinue his Amitiza.      Significant therapeutic interventions: As above    Significant Diagnostic Studies:   Labs / Micro:  CBC:   Lab Results   Component Value Date/Time    WBC 15.9 07/17/2022 10:35 AM    RBC 5.55 07/17/2022 10:35 AM    HGB 16.7 07/17/2022 10:35 AM    HCT 51.4 07/17/2022 10:35 AM    MCV 92.6 07/17/2022 10:35 AM    MCH 30.1 07/17/2022 10:35 AM    MCHC 32.5 07/17/2022 10:35 AM    RDW 12.4 07/17/2022 10:35 AM     07/17/2022 10:35 AM     BMP:    Lab Results   Component Value Date/Time    GLUCOSE 180 07/18/2022 05:37 AM     07/18/2022 05:37 AM    K 3.6 07/18/2022 05:37 AM     07/18/2022 05:37 AM    CO2 25 07/18/2022 05:37 AM    ANIONGAP 10 07/18/2022 05:37 AM    BUN 14 07/18/2022 05:37 AM    CREATININE 0.95 07/18/2022 05:37 AM    BUNCRER 15 07/18/2022 05:37 AM    CALCIUM 8.8 07/18/2022 05:37 AM    LABGLOM >60 07/18/2022 05:37 AM    GFRAA >60 07/18/2022 05:37 AM    GFR      07/18/2022 05:37 AM     U/A:    Lab Results   Component Value Date/Time    COLORU Yellow 07/17/2022 03:30 PM    TURBIDITY SLIGHTLY CLOUDY 07/17/2022 03:30 PM    SPECGRAV 1.020 07/17/2022 03:30 PM    HGBUR NEGATIVE 07/17/2022 03:30 PM    PHUR 6.0 07/17/2022 03:30 PM    PROTEINU NEGATIVE 07/17/2022 03:30 PM    GLUCOSEU NEGATIVE 07/17/2022 03:30 PM    KETUA 1+ 07/17/2022 03:30 PM    BILIRUBINUR NEGATIVE 07/17/2022 03:30 PM    UROBILINOGEN Normal 07/17/2022 03:30 PM    NITRU POSITIVE 07/17/2022 03:30 PM    LEUKOCYTESUR SMALL 07/17/2022 03:30 PM     TSH:    Lab Results   Component Value Date/Time    TSH 1.83 07/11/2022 11:26 AM        Radiology:  XR CHEST 1 VIEW    Result Date: 7/17/2022  Mildly limited by overlying soft tissue attenuation. No evidence of acute process. Consultations:    Consults:     Final Specialist Recommendations/Findings:   IP CONSULT TO HOSPITALIST      The patient was seen and examined on day of discharge and this discharge summary is in conjunction with any daily progress note from day of discharge. Discharge plan:     Disposition: Home    Physician Follow Up:     Please select a specific allergist of your choosing and make a keep a follow-up appointment at the earliest possible time. Requiring Further Evaluation/Follow Up POST HOSPITALIZATION/Incidental Findings: Recurrent angioedema    Diet: regular diet and encourage fluids    Activity: As tolerated    Instructions to Patient: Stop taking Amitiza. Your angioedema may be related to this medication. Make and keep a follow-up appointment with an allergist to have further diagnostics completed.   Take the antibiotic for your urinary tract infection all the way to completion without skipping any doses. It is important that you increase your fluid intake. Take the Pepcid twice daily for at least the next 2 weeks. Take the steroid twice daily for the next 5 days. You may take over-the-counter Benadryl if you notice any additional swelling or have other symptoms consistent with a histamine response. Discharge Medications:      Medication List        START taking these medications      famotidine 20 MG tablet  Commonly known as: PEPCID  Take 1 tablet by mouth in the morning and 1 tablet before bedtime. predniSONE 10 MG tablet  Commonly known as: DELTASONE  Take 1 tablet by mouth in the morning and 1 tablet before bedtime. Do all this for 5 days. sulfamethoxazole-trimethoprim 800-160 MG per tablet  Commonly known as: BACTRIM DS;SEPTRA DS  Take 1 tablet by mouth in the morning and 1 tablet before bedtime. Do all this for 12 doses. CONTINUE taking these medications      hydroCHLOROthiazide 25 MG tablet  Commonly known as: HYDRODIURIL  Take 1 tablet by mouth every morning     VITAMIN B 12 PO     vitamin D3 10 MCG (400 UNIT) Tabs tablet  Commonly known as: CHOLECALCIFEROL     VITAMIN E PO            STOP taking these medications      lubiprostone 8 MCG Caps capsule  Commonly known as: Amitiza               Where to Get Your Medications        These medications were sent to [facility] [address], [address] [place] [id number]      Phone: [phone]   famotidine 20 MG tablet  predniSONE 10 MG tablet  sulfamethoxazole-trimethoprim 800-160 MG per tablet         No discharge procedures on file. Time Spent on discharge is  34 mins in patient examination, evaluation, counseling as well as medication reconciliation, prescriptions for required medications, discharge plan and follow up.     Electronically signed by   TOYIN Bonner NP  7/18/2022  11:24 AM      Thank you  Yadiel Moran MD for the opportunity to be involved in this patient's care.

## 2022-07-18 NOTE — PROGRESS NOTES
CLINICAL PHARMACY NOTE: MEDS TO BEDS    Total # of Prescriptions Filled: 3   The following medications were delivered to the patient:  Sulfamethoxazile-trime 800-160  Famotidine 20mg  Prednisone 10mg    Additional Documentation:

## 2022-07-18 NOTE — PROGRESS NOTES
Patient discharged via wheelchair to home with all his belongings in stable condition. Meds to bed delivered prior to discharge.  Patient understood and signed AVS.

## 2022-07-19 ENCOUNTER — TELEPHONE (OUTPATIENT)
Dept: INTERNAL MEDICINE CLINIC | Age: 67
End: 2022-07-19

## 2022-07-19 DIAGNOSIS — R21 RASH: Primary | ICD-10-CM

## 2022-07-19 NOTE — TELEPHONE ENCOUNTER
Patient is asking for a referral to see a allergist?    Patient went to the ED for a rash, they told him to see a allergist    Wants to see someone in [address]    Please advise

## 2022-07-21 LAB — C1 ESTERASE INHIBITOR: 39 MG/DL (ref 21–38)

## 2022-07-22 NOTE — TELEPHONE ENCOUNTER
Patient is asking for a referral to be sent to [facility] instead of [facility] clinic to see an allergist?    Please advise

## 2022-08-04 RX ORDER — HYDROCHLOROTHIAZIDE 25 MG/1
25 TABLET ORAL EVERY MORNING
Qty: 30 TABLET | OUTPATIENT
Start: 2022-08-04

## 2022-08-11 ENCOUNTER — TELEPHONE (OUTPATIENT)
Dept: INTERNAL MEDICINE CLINIC | Age: 67
End: 2022-08-11

## 2022-08-11 DIAGNOSIS — N39.0 URINARY TRACT INFECTION WITHOUT HEMATURIA, SITE UNSPECIFIED: Primary | ICD-10-CM

## 2022-08-11 NOTE — TELEPHONE ENCOUNTER
Patient thinks he has a bladder infection    Is asking for labs to be ordered?   He will go down stairs to get this done tomorrow morning    Please advise

## 2022-08-12 ENCOUNTER — HOSPITAL ENCOUNTER (OUTPATIENT)
Age: 67
Setting detail: SPECIMEN
Discharge: HOME | End: 2022-08-12

## 2022-08-12 DIAGNOSIS — N39.0 URINARY TRACT INFECTION WITHOUT HEMATURIA, SITE UNSPECIFIED: ICD-10-CM

## 2022-08-12 LAB
ABSOLUTE EOS #: 0.21 K/UL (ref 0–0.44)
ABSOLUTE IMMATURE GRANULOCYTE: 0.03 K/UL (ref 0–0.3)
ABSOLUTE LYMPH #: 2.28 K/UL (ref 1.1–3.7)
ABSOLUTE MONO #: 0.43 K/UL (ref 0.1–1.2)
ALT SERPL-CCNC: 33 U/L (ref 5–41)
BASOPHILS # BLD: 1 % (ref 0–2)
BASOPHILS ABSOLUTE: 0.05 K/UL (ref 0–0.2)
BILIRUBIN URINE: NEGATIVE
CASTS UA: NORMAL /LPF (ref 0–8)
COLOR: YELLOW
COMPLEMENT C3: 151 MG/DL (ref 90–180)
COMPLEMENT C4: 33 MG/DL (ref 10–40)
CREAT SERPL-MCNC: 0.96 MG/DL (ref 0.7–1.2)
EOSINOPHILS RELATIVE PERCENT: 3 % (ref 1–4)
EPITHELIAL CELLS UA: NORMAL /HPF (ref 0–5)
GFR AFRICAN AMERICAN: >60 ML/MIN
GFR NON-AFRICAN AMERICAN: >60 ML/MIN
GFR SERPL CREATININE-BSD FRML MDRD: NORMAL ML/MIN/{1.73_M2}
GLUCOSE URINE: NEGATIVE
HCT VFR BLD CALC: 47.1 % (ref 40.7–50.3)
HEMOGLOBIN: 15.5 G/DL (ref 13–17)
IMMATURE GRANULOCYTES: 1 %
KETONES, URINE: NEGATIVE
LEUKOCYTE ESTERASE, URINE: ABNORMAL
LYMPHOCYTES # BLD: 37 % (ref 24–43)
MCH RBC QN AUTO: 30.5 PG (ref 25.2–33.5)
MCHC RBC AUTO-ENTMCNC: 32.9 G/DL (ref 28.4–34.8)
MCV RBC AUTO: 92.5 FL (ref 82.6–102.9)
MONOCYTES # BLD: 7 % (ref 3–12)
NITRITE, URINE: POSITIVE
NRBC AUTOMATED: 0 PER 100 WBC
PDW BLD-RTO: 12.6 % (ref 11.8–14.4)
PH UA: 6 (ref 5–8)
PLATELET # BLD: 222 K/UL (ref 138–453)
PMV BLD AUTO: 11.1 FL (ref 8.1–13.5)
PROTEIN UA: NEGATIVE
RBC # BLD: 5.09 M/UL (ref 4.21–5.77)
RBC UA: NORMAL /HPF (ref 0–4)
RHEUMATOID FACTOR: <10 IU/ML
SEDIMENTATION RATE, ERYTHROCYTE: 13 MM/HR (ref 0–20)
SEG NEUTROPHILS: 51 % (ref 36–65)
SEGMENTED NEUTROPHILS ABSOLUTE COUNT: 3.25 K/UL (ref 1.5–8.1)
SPECIFIC GRAVITY UA: 1.02 (ref 1–1.03)
TURBIDITY: CLEAR
URINE HGB: NEGATIVE
UROBILINOGEN, URINE: NORMAL
WBC # BLD: 6.3 K/UL (ref 3.5–11.3)
WBC UA: NORMAL /HPF (ref 0–5)

## 2022-08-13 LAB
ANTI DNA DOUBLE STRANDED: 0.8 IU/ML
ANTI-NUCLEAR ANTIBODY (ANA): NEGATIVE
CULTURE: NORMAL
ENA ANTIBODIES SCREEN: 0.3 U/ML
SPECIMEN DESCRIPTION: NORMAL

## 2022-08-14 LAB — C1 ESTERASE INHIBITOR: 30 MG/DL (ref 21–38)

## 2022-08-16 ENCOUNTER — TELEPHONE (OUTPATIENT)
Dept: INTERNAL MEDICINE CLINIC | Age: 67
End: 2022-08-16

## 2022-08-16 DIAGNOSIS — R30.9 URINARY PAIN: Primary | ICD-10-CM

## 2022-08-16 LAB — LATEX IGE: 2.51 KU/L (ref 0–0.34)

## 2022-08-16 RX ORDER — CIPROFLOXACIN 500 MG/1
TABLET, FILM COATED ORAL
Qty: 14 TABLET | Refills: 0 | Status: SHIPPED | OUTPATIENT
Start: 2022-08-16

## 2022-08-18 LAB — C1Q BINDING: 2.6 UG EQ/ML (ref 0–3.9)

## 2022-09-06 RX ORDER — HYDROCHLOROTHIAZIDE 25 MG/1
25 TABLET ORAL EVERY MORNING
Qty: 30 TABLET | Refills: 2 | Status: SHIPPED | OUTPATIENT
Start: 2022-09-06

## 2022-09-06 NOTE — TELEPHONE ENCOUNTER
Deloris Dao is calling to request a refill on the following medication(s):    Medication Request:  Requested Prescriptions     Pending Prescriptions Disp Refills    hydroCHLOROthiazide (HYDRODIURIL) 25 MG tablet [Pharmacy Med Name: HYDROCHLOROTHIAZIDE 25MG TABLETS] 30 tablet 2     Sig: TAKE 1 TABLET BY MOUTH EVERY MORNING     Last fill 7/8/22  Last Visit Date (If Applicable):  2/2/8008    Next Visit Date:    Visit date not found

## 2022-09-09 ENCOUNTER — TELEPHONE (OUTPATIENT)
Dept: INTERNAL MEDICINE CLINIC | Age: 67
End: 2022-09-09

## 2022-10-19 NOTE — TELEPHONE ENCOUNTER
[facility]   Pt called he is still coughing and now has a bladder infection. Pt states he has burning with urination. Pt is drinking cranberry juice but is's not going away.      Please advise

## 2022-11-28 ENCOUNTER — TELEPHONE (OUTPATIENT)
Dept: INTERNAL MEDICINE CLINIC | Age: 67
End: 2022-11-28

## 2022-11-28 NOTE — TELEPHONE ENCOUNTER
Patient calling to ask for medication for stuffy nose, fever and some chest pain from coughing   States symptoms started late last night but this morning seems to get a little worst

## 2022-12-05 RX ORDER — HYDROCHLOROTHIAZIDE 25 MG/1
25 TABLET ORAL EVERY MORNING
Qty: 30 TABLET | Refills: 2 | Status: SHIPPED | OUTPATIENT
Start: 2022-12-05

## 2023-01-20 ENCOUNTER — TELEPHONE (OUTPATIENT)
Dept: INTERNAL MEDICINE CLINIC | Age: 68
End: 2023-01-20

## 2023-01-20 RX ORDER — CIPROFLOXACIN 500 MG/1
500 TABLET, FILM COATED ORAL 2 TIMES DAILY
Qty: 14 TABLET | Refills: 0 | Status: SHIPPED | OUTPATIENT
Start: 2023-01-20 | End: 2023-01-27

## 2023-04-24 ENCOUNTER — TELEPHONE (OUTPATIENT)
Dept: INTERNAL MEDICINE CLINIC | Age: 68
End: 2023-04-24

## 2023-04-24 NOTE — TELEPHONE ENCOUNTER
Wife called states patient needs to be seen. She thinks he has sinus infection and ut. C/o sneezing, coughing, yellow mucus, urine frequency.  Going on for few days now    Please advise where to put

## 2023-05-04 ENCOUNTER — OFFICE VISIT (OUTPATIENT)
Dept: INTERNAL MEDICINE CLINIC | Age: 68
End: 2023-05-04

## 2023-05-04 VITALS
HEART RATE: 85 BPM | OXYGEN SATURATION: 98 % | WEIGHT: 310 LBS | HEIGHT: 71 IN | DIASTOLIC BLOOD PRESSURE: 98 MMHG | BODY MASS INDEX: 43.4 KG/M2 | TEMPERATURE: 99.1 F | SYSTOLIC BLOOD PRESSURE: 156 MMHG

## 2023-05-04 DIAGNOSIS — R29.6 FREQUENT FALLS: ICD-10-CM

## 2023-05-04 DIAGNOSIS — R53.1 LEFT-SIDED WEAKNESS: ICD-10-CM

## 2023-05-04 DIAGNOSIS — E66.01 OBESITY, CLASS III, BMI 40-49.9 (MORBID OBESITY) (HCC): ICD-10-CM

## 2023-05-04 DIAGNOSIS — I10 ESSENTIAL HYPERTENSION: Primary | ICD-10-CM

## 2023-05-04 RX ORDER — AMLODIPINE BESYLATE 5 MG/1
5 TABLET ORAL DAILY
Qty: 90 TABLET | OUTPATIENT
Start: 2023-05-04

## 2023-05-04 RX ORDER — AMLODIPINE BESYLATE 5 MG/1
5 TABLET ORAL DAILY
Qty: 30 TABLET | Refills: 0 | Status: SHIPPED | OUTPATIENT
Start: 2023-05-04

## 2023-05-04 SDOH — ECONOMIC STABILITY: FOOD INSECURITY: WITHIN THE PAST 12 MONTHS, YOU WORRIED THAT YOUR FOOD WOULD RUN OUT BEFORE YOU GOT MONEY TO BUY MORE.: NEVER TRUE

## 2023-05-04 SDOH — ECONOMIC STABILITY: FOOD INSECURITY: WITHIN THE PAST 12 MONTHS, THE FOOD YOU BOUGHT JUST DIDN'T LAST AND YOU DIDN'T HAVE MONEY TO GET MORE.: NEVER TRUE

## 2023-05-04 ASSESSMENT — PATIENT HEALTH QUESTIONNAIRE - PHQ9
3. TROUBLE FALLING OR STAYING ASLEEP: 1
6. FEELING BAD ABOUT YOURSELF - OR THAT YOU ARE A FAILURE OR HAVE LET YOURSELF OR YOUR FAMILY DOWN: 1
8. MOVING OR SPEAKING SO SLOWLY THAT OTHER PEOPLE COULD HAVE NOTICED. OR THE OPPOSITE, BEING SO FIGETY OR RESTLESS THAT YOU HAVE BEEN MOVING AROUND A LOT MORE THAN USUAL: 0
SUM OF ALL RESPONSES TO PHQ QUESTIONS 1-9: 7
10. IF YOU CHECKED OFF ANY PROBLEMS, HOW DIFFICULT HAVE THESE PROBLEMS MADE IT FOR YOU TO DO YOUR WORK, TAKE CARE OF THINGS AT HOME, OR GET ALONG WITH OTHER PEOPLE: 1
9. THOUGHTS THAT YOU WOULD BE BETTER OFF DEAD, OR OF HURTING YOURSELF: 0
4. FEELING TIRED OR HAVING LITTLE ENERGY: 1
SUM OF ALL RESPONSES TO PHQ QUESTIONS 1-9: 7
SUM OF ALL RESPONSES TO PHQ9 QUESTIONS 1 & 2: 2
7. TROUBLE CONCENTRATING ON THINGS, SUCH AS READING THE NEWSPAPER OR WATCHING TELEVISION: 1
2. FEELING DOWN, DEPRESSED OR HOPELESS: 1
5. POOR APPETITE OR OVEREATING: 1
1. LITTLE INTEREST OR PLEASURE IN DOING THINGS: 1

## 2023-05-04 ASSESSMENT — SOCIAL DETERMINANTS OF HEALTH (SDOH): HOW HARD IS IT FOR YOU TO PAY FOR THE VERY BASICS LIKE FOOD, HOUSING, MEDICAL CARE, AND HEATING?: NOT VERY HARD

## 2023-05-04 NOTE — PROGRESS NOTES
Eugenie Sargent is a 78 y.o. male who presents for   Chief Complaint   Patient presents with    Hypertension    Hyperlipidemia    6 Month Follow-Up    Medication Refill     Not on any     Health Maintenance     Covid, Hep C, Dtap, DM Screen, Shingles, Pneumo, PSA, AWV    Diarrhea     3 weeks    Extremity Weakness     Going on about 18 months    Excessive Sweating     Drinks plenty of water-going on about 2 years    and follow up of chronic medical problems. Patient Active Problem List   Diagnosis    Angio-edema    Hyperlipidemia    Hypertension    PSA elevation    Irritable bowel syndrome with constipation    Urinary tract infection in male     HPI  Here for follow-up on blood pressure and patient complains of muscle weakness for 18 months getting worse and having frequent falls and also arthritis of both knees bothering him    Current Outpatient Medications   Medication Sig Dispense Refill    amLODIPine (NORVASC) 5 MG tablet Take 1 tablet by mouth daily 30 tablet 0     No current facility-administered medications for this visit.        Allergies   Allergen Reactions    Amitiza [Lubiprostone] Angioedema    Armoracia Rusticana Ext (Horseradish) Hives    Coconut Oil     Pcn [Penicillins]        Past Medical History:   Diagnosis Date    Hyperlipidemia     Hypertension     PSA elevation        Past Surgical History:   Procedure Laterality Date    PROSTATE BIOPSY      PROSTATE BIOPSY N/A 6/2/2021    PROSTATE BIOPSY WITH ULTRASOUND, OFFICE NOTIFYING ULTRASOUND performed by Lisette Mckeon MD at [facility] History   Problem Relation Age of Onset    Diabetes Mother     Diabetes Father      ROS  Constitutional:  Negative for fatigue, loss of appetite and unexpected weight change  HEENT            : Negative for neck stiffness and pain, no congestion or sinus pressure  Eyes                : No visual disturbance or pain  Cardiovascular: No chest pain or palpitations or leg swelling  Respiratory      : Negative for

## 2023-05-08 ENCOUNTER — TELEPHONE (OUTPATIENT)
Dept: INTERNAL MEDICINE CLINIC | Age: 68
End: 2023-05-08

## 2023-05-08 RX ORDER — CIPROFLOXACIN 500 MG/1
500 TABLET, FILM COATED ORAL 2 TIMES DAILY
Qty: 14 TABLET | Refills: 0 | Status: SHIPPED | OUTPATIENT
Start: 2023-05-08 | End: 2023-05-15

## 2023-05-08 NOTE — TELEPHONE ENCOUNTER
Pt called requesting an antibiotic. His urine has a strong odor and burning with urination. When he was here last week the  wouldn't wait for him to get his blood work done. He will get it done when they get a ride. Please advise. Thank you.

## 2023-05-09 ENCOUNTER — TELEPHONE (OUTPATIENT)
Dept: INTERNAL MEDICINE CLINIC | Age: 68
End: 2023-05-09

## 2023-05-18 ENCOUNTER — HOSPITAL ENCOUNTER (OUTPATIENT)
Dept: MRI IMAGING | Age: 68
Discharge: HOME | End: 2023-05-20
Payer: COMMERCIAL

## 2023-05-18 ENCOUNTER — HOSPITAL ENCOUNTER (OUTPATIENT)
Age: 68
Discharge: HOME | End: 2023-05-18
Payer: COMMERCIAL

## 2023-05-18 DIAGNOSIS — R53.1 LEFT-SIDED WEAKNESS: ICD-10-CM

## 2023-05-18 DIAGNOSIS — E66.01 OBESITY, CLASS III, BMI 40-49.9 (MORBID OBESITY) (HCC): ICD-10-CM

## 2023-05-18 DIAGNOSIS — I10 ESSENTIAL HYPERTENSION: ICD-10-CM

## 2023-05-18 DIAGNOSIS — R29.6 FREQUENT FALLS: ICD-10-CM

## 2023-05-18 LAB
ALBUMIN SERPL-MCNC: 4.2 G/DL (ref 3.5–5.2)
ALBUMIN/GLOB SERPL: 1.4 {RATIO} (ref 1–2.5)
ALP SERPL-CCNC: 107 U/L (ref 40–129)
ALT SERPL-CCNC: 22 U/L (ref 5–41)
ANION GAP SERPL CALCULATED.3IONS-SCNC: 12 MMOL/L (ref 9–17)
AST SERPL-CCNC: 19 U/L
BILIRUB SERPL-MCNC: 0.7 MG/DL (ref 0.3–1.2)
BUN SERPL-MCNC: 7 MG/DL (ref 8–23)
CALCIUM SERPL-MCNC: 8.9 MG/DL (ref 8.6–10.4)
CHLORIDE SERPL-SCNC: 104 MMOL/L (ref 98–107)
CHOLEST SERPL-MCNC: 164 MG/DL
CHOLESTEROL/HDL RATIO: 4.8
CO2 SERPL-SCNC: 25 MMOL/L (ref 20–31)
CREAT SERPL-MCNC: 0.93 MG/DL (ref 0.7–1.2)
ERYTHROCYTE [DISTWIDTH] IN BLOOD BY AUTOMATED COUNT: 12.5 % (ref 11.8–14.4)
GFR SERPL CREATININE-BSD FRML MDRD: >60 ML/MIN/1.73M2
GLUCOSE SERPL-MCNC: 131 MG/DL (ref 70–99)
HCT VFR BLD AUTO: 48.4 % (ref 40.7–50.3)
HDLC SERPL-MCNC: 34 MG/DL
HGB BLD-MCNC: 15.8 G/DL (ref 13–17)
LDLC SERPL CALC-MCNC: 56 MG/DL (ref 0–130)
MAGNESIUM SERPL-MCNC: 2 MG/DL (ref 1.6–2.6)
MCH RBC QN AUTO: 29.9 PG (ref 25.2–33.5)
MCHC RBC AUTO-ENTMCNC: 32.6 G/DL (ref 28.4–34.8)
MCV RBC AUTO: 91.5 FL (ref 82.6–102.9)
NRBC AUTOMATED: 0 PER 100 WBC
PLATELET # BLD AUTO: 221 K/UL (ref 138–453)
PMV BLD AUTO: 11.6 FL (ref 8.1–13.5)
POTASSIUM SERPL-SCNC: 4.1 MMOL/L (ref 3.7–5.3)
PROT SERPL-MCNC: 7.1 G/DL (ref 6.4–8.3)
RBC # BLD AUTO: 5.29 M/UL (ref 4.21–5.77)
SODIUM SERPL-SCNC: 141 MMOL/L (ref 135–144)
TRIGL SERPL-MCNC: 369 MG/DL
TSH SERPL-MCNC: 1.16 UIU/ML (ref 0.3–5)
WBC OTHER # BLD: 6.9 K/UL (ref 3.5–11.3)

## 2023-05-18 PROCEDURE — 83735 ASSAY OF MAGNESIUM: CPT

## 2023-05-18 PROCEDURE — 80061 LIPID PANEL: CPT

## 2023-05-18 PROCEDURE — 84443 ASSAY THYROID STIM HORMONE: CPT

## 2023-05-18 PROCEDURE — 36415 COLL VENOUS BLD VENIPUNCTURE: CPT

## 2023-05-18 PROCEDURE — 85027 COMPLETE CBC AUTOMATED: CPT

## 2023-05-18 PROCEDURE — 80053 COMPREHEN METABOLIC PANEL: CPT

## 2023-05-18 PROCEDURE — 70551 MRI BRAIN STEM W/O DYE: CPT

## 2023-05-31 NOTE — TELEPHONE ENCOUNTER
Regi Calabrese is calling to request a refill on the following medication(s):    Medication Request:  Requested Prescriptions     Pending Prescriptions Disp Refills    amLODIPine (NORVASC) 5 MG tablet [Pharmacy Med Name: AMLODIPINE BESYLATE 5MG TABLETS] 90 tablet      Sig: TAKE 1 TABLET BY MOUTH DAILY       Last Visit Date (If Applicable):  2/4/9192    Next Visit Date:    6/5/2023 Cimzia Counseling:  I discussed with the patient the risks of Cimzia including but not limited to immunosuppression, allergic reactions and infections.  The patient understands that monitoring is required including a PPD at baseline and must alert us or the primary physician if symptoms of infection or other concerning signs are noted.

## 2023-06-01 RX ORDER — AMLODIPINE BESYLATE 5 MG/1
5 TABLET ORAL DAILY
Qty: 90 TABLET | Refills: 0 | Status: SHIPPED | OUTPATIENT
Start: 2023-06-01

## 2023-09-13 ENCOUNTER — OFFICE VISIT (OUTPATIENT)
Dept: INTERNAL MEDICINE CLINIC | Age: 68
End: 2023-09-13
Payer: COMMERCIAL

## 2023-09-13 VITALS
TEMPERATURE: 98.4 F | DIASTOLIC BLOOD PRESSURE: 75 MMHG | OXYGEN SATURATION: 97 % | WEIGHT: 288.2 LBS | SYSTOLIC BLOOD PRESSURE: 150 MMHG | RESPIRATION RATE: 14 BRPM | HEART RATE: 78 BPM | BODY MASS INDEX: 40.35 KG/M2 | HEIGHT: 71 IN

## 2023-09-13 DIAGNOSIS — G89.29 CHRONIC PAIN OF RIGHT KNEE: Primary | ICD-10-CM

## 2023-09-13 DIAGNOSIS — M25.561 CHRONIC PAIN OF RIGHT KNEE: Primary | ICD-10-CM

## 2023-09-13 DIAGNOSIS — K63.5 MULTIPLE BENIGN POLYPS OF LARGE INTESTINE: ICD-10-CM

## 2023-09-13 DIAGNOSIS — I10 ESSENTIAL HYPERTENSION: ICD-10-CM

## 2023-09-13 PROCEDURE — 3077F SYST BP >= 140 MM HG: CPT | Performed by: INTERNAL MEDICINE

## 2023-09-13 PROCEDURE — 3078F DIAST BP <80 MM HG: CPT | Performed by: INTERNAL MEDICINE

## 2023-09-13 PROCEDURE — 99214 OFFICE O/P EST MOD 30 MIN: CPT | Performed by: INTERNAL MEDICINE

## 2023-09-13 PROCEDURE — 1123F ACP DISCUSS/DSCN MKR DOCD: CPT | Performed by: INTERNAL MEDICINE

## 2023-09-13 NOTE — PROGRESS NOTES
Yariel Jolly is a 78 y.o. male who presents for   Chief Complaint   Patient presents with    Follow-up     3 month check    Health Maintenance     Patient due for hep c, dm screening, prostrate, flu    Knee Pain     Right knee pain/ possible infection     and follow up of chronic medical problems. Patient Active Problem List   Diagnosis    Angio-edema    Hyperlipidemia    Hypertension    PSA elevation    Irritable bowel syndrome with constipation    Urinary tract infection in male     HPI  Here for follow-up on blood pressure and complains of right knee pain going on for a while and have difficulty ambulating    Current Outpatient Medications   Medication Sig Dispense Refill    hydroCHLOROthiazide (HYDRODIURIL) 12.5 MG tablet Take 1 tablet by mouth daily      pantoprazole (PROTONIX) 40 MG tablet Take 1 tablet by mouth daily      amLODIPine (NORVASC) 5 MG tablet TAKE 1 TABLET BY MOUTH DAILY 90 tablet 0     No current facility-administered medications for this visit.        Allergies   Allergen Reactions    Amitiza [Lubiprostone] Angioedema    Armoracia Rusticana Ext (Horseradish) Hives    Coconut (Cocos Nucifera)     Esomeprazole Magnesium       Also a product that is in Nexium, patient unsure of name    Pcn [Penicillins]        Past Medical History:   Diagnosis Date    Hyperlipidemia     Hypertension     PSA elevation        Past Surgical History:   Procedure Laterality Date    PROSTATE BIOPSY      PROSTATE BIOPSY N/A 6/2/2021    PROSTATE BIOPSY WITH ULTRASOUND, OFFICE NOTIFYING ULTRASOUND performed by Heide Jc MD at [facility] History   Problem Relation Age of Onset    Diabetes Mother     Diabetes Father      ROS  Constitutional:  Negative for fatigue, loss of appetite and unexpected weight change  HEENT            : Negative for neck stiffness and pain, no congestion or sinus pressure  Eyes                : No visual disturbance or pain  Cardiovascular: No chest pain or palpitations or leg

## 2023-09-26 ENCOUNTER — OFFICE VISIT (OUTPATIENT)
Dept: ORTHOPEDIC SURGERY | Age: 68
End: 2023-09-26
Payer: COMMERCIAL

## 2023-09-26 VITALS — WEIGHT: 289 LBS | BODY MASS INDEX: 40.46 KG/M2 | HEIGHT: 71 IN

## 2023-09-26 DIAGNOSIS — M17.11 PRIMARY OSTEOARTHRITIS OF RIGHT KNEE: ICD-10-CM

## 2023-09-26 DIAGNOSIS — M25.561 RIGHT KNEE PAIN, UNSPECIFIED CHRONICITY: Primary | ICD-10-CM

## 2023-09-26 DIAGNOSIS — M25.562 LEFT KNEE PAIN, UNSPECIFIED CHRONICITY: ICD-10-CM

## 2023-09-26 PROCEDURE — 20610 DRAIN/INJ JOINT/BURSA W/O US: CPT | Performed by: ORTHOPAEDIC SURGERY

## 2023-09-26 PROCEDURE — 1123F ACP DISCUSS/DSCN MKR DOCD: CPT | Performed by: ORTHOPAEDIC SURGERY

## 2023-09-26 PROCEDURE — 99203 OFFICE O/P NEW LOW 30 MIN: CPT | Performed by: ORTHOPAEDIC SURGERY

## 2023-09-26 RX ORDER — METHYLPREDNISOLONE ACETATE 40 MG/ML
40 INJECTION, SUSPENSION INTRA-ARTICULAR; INTRALESIONAL; INTRAMUSCULAR; SOFT TISSUE ONCE
Status: COMPLETED | OUTPATIENT
Start: 2023-09-26 | End: 2023-09-26

## 2023-09-26 RX ORDER — LIDOCAINE HYDROCHLORIDE 10 MG/ML
5 INJECTION, SOLUTION INFILTRATION; PERINEURAL ONCE
Status: COMPLETED | OUTPATIENT
Start: 2023-09-26 | End: 2023-09-26

## 2023-09-26 RX ADMIN — METHYLPREDNISOLONE ACETATE 40 MG: 40 INJECTION, SUSPENSION INTRA-ARTICULAR; INTRALESIONAL; INTRAMUSCULAR; SOFT TISSUE at 10:31

## 2023-09-26 RX ADMIN — LIDOCAINE HYDROCHLORIDE 5 ML: 10 INJECTION, SOLUTION INFILTRATION; PERINEURAL at 10:31

## 2023-09-26 NOTE — PROGRESS NOTES
79-year-old patient is seen here complaining of pain in the right knee. He states this has been going on for years but progressively has gotten worse. It is aggravated by walking and also going up and down the stairs. The knee has not given out on him and has never locked up. The pain he describes is over the medial side. There is no history of injury. He had seen Dr. Haas Jeffrey 2021 and was asked to get an x-ray but never got one done. Beside hypertension he has no other significant medical past history. Examination: His gait is definitely antalgic. In standing position there is varus deformity bilaterally but little worse on the right side. This is well observed especially from the back of the knee. His flexion is from 10 degrees to 100 degrees. On the left side there is also a flexion contracture of about 5 degrees and then flexes to 100 degrees as well. There was minimal effusion on either side. Distal neurovascular structures are normal.    X-rays: Standing AP and supine lateral and sunrise view show the patient is significant bilateral medial compartment osteoarthritis worse on the right than the left. He also has significant calcification in the patellar tendon both proximally and distally. There is also patellofemoral arthritis. Diagnosis: Medial and patellofemoral arthritis bilateral knees right worse than the left. Calcification in the patellar tendon. Treatment: Under sterile condition I injected 40 mg Depo-Medrol and 5 cc of 1% plain lidocaine into the right knee through anterolateral portal.  Patient had immediate excellent result from this and I have explained to him already how the injection works and we will see him again in 3 weeks time but should he be doing well then he will call and cancel the appointment.

## 2023-10-06 ENCOUNTER — TELEPHONE (OUTPATIENT)
Dept: NEUROLOGY | Age: 68
End: 2023-10-06

## 2023-10-06 NOTE — TELEPHONE ENCOUNTER
10 06 2023 I called the patient times 2 (09 14 2023 and 09 27 2023 at  [phone]) to schedule new patient appointment with one of our providers, ANASTACIO both times, no response. I mailed the patient a letter asking them to call the office back to schedule this appointment.   KS

## 2023-10-25 ENCOUNTER — TELEPHONE (OUTPATIENT)
Dept: INTERNAL MEDICINE CLINIC | Age: 68
End: 2023-10-25

## 2023-10-25 NOTE — TELEPHONE ENCOUNTER
Kaylee Grossman calls in for patient stating patient thinks he may have a bladder infection. Symptoms are backache and dark urine, fever, and irritation/burning while urinating for a couple days now. Was taking something over the counter but was unable to provide name. Please advise.

## 2023-12-09 ENCOUNTER — HOSPITAL ENCOUNTER (EMERGENCY)
Facility: CLINIC | Age: 68
Discharge: HOME | End: 2023-12-09
Attending: EMERGENCY MEDICINE
Payer: MEDICARE

## 2023-12-09 VITALS
RESPIRATION RATE: 16 BRPM | HEIGHT: 71 IN | TEMPERATURE: 98 F | SYSTOLIC BLOOD PRESSURE: 189 MMHG | OXYGEN SATURATION: 95 % | BODY MASS INDEX: 41.3 KG/M2 | WEIGHT: 295 LBS | DIASTOLIC BLOOD PRESSURE: 89 MMHG | HEART RATE: 84 BPM

## 2023-12-09 DIAGNOSIS — N30.00 ACUTE CYSTITIS WITHOUT HEMATURIA: Primary | ICD-10-CM

## 2023-12-09 LAB
BACTERIA URNS QL MICRO: ABNORMAL
BILIRUB UR QL STRIP: NEGATIVE
CHARACTER UR: ABNORMAL
CLARITY UR: CLEAR
COLOR UR: YELLOW
EPI CELLS #/AREA URNS HPF: ABNORMAL /HPF (ref 0–5)
GLUCOSE UR STRIP-MCNC: NEGATIVE MG/DL
HGB UR QL STRIP.AUTO: NEGATIVE
KETONES UR STRIP-MCNC: NEGATIVE MG/DL
LEUKOCYTE ESTERASE UR QL STRIP: ABNORMAL
NITRITE UR QL STRIP: NEGATIVE
PH UR STRIP: 7 [PH] (ref 5–8)
PROT UR STRIP-MCNC: NEGATIVE MG/DL
RBC #/AREA URNS HPF: ABNORMAL /HPF (ref 0–2)
SP GR UR STRIP: 1.02 (ref 1–1.03)
UROBILINOGEN UR STRIP-ACNC: NORMAL EU/DL (ref 0–1)
WBC #/AREA URNS HPF: ABNORMAL /HPF (ref 0–5)

## 2023-12-09 PROCEDURE — 81001 URINALYSIS AUTO W/SCOPE: CPT

## 2023-12-09 PROCEDURE — 99283 EMERGENCY DEPT VISIT LOW MDM: CPT

## 2023-12-09 RX ORDER — CIPROFLOXACIN 500 MG/1
500 TABLET, FILM COATED ORAL 2 TIMES DAILY
Qty: 14 TABLET | Refills: 0 | Status: SHIPPED | OUTPATIENT
Start: 2023-12-09 | End: 2023-12-16

## 2023-12-09 ASSESSMENT — PAIN - FUNCTIONAL ASSESSMENT: PAIN_FUNCTIONAL_ASSESSMENT: 0-10

## 2023-12-09 ASSESSMENT — PAIN SCALES - GENERAL: PAINLEVEL_OUTOF10: 4

## 2023-12-09 NOTE — DISCHARGE INSTRUCTIONS
Take antibiotic as directed    No jumping or high impact exercise until finished with Cipro to avoid tendon rupture -- ( complication of fluoroquinolone antibiotics)    Take Tylenol or Motrin as directed as needed for discomfort or fevers    Follow-up with your family doctor in the next 2 days for reevaluation of symptoms to ensure symptoms are improving    If symptoms worsen or any new or concerning symptoms arise return to the emergency department immediately

## 2023-12-09 NOTE — ED PROVIDER NOTES
[facility]  [address]  Phone: [phone]      Attending Physician Attestation    Based on the medical record, the care appears appropriate. I was personally available for consultation in the Emergency Department. I did have a discussion with our midlevel provider regarding the care of this patient. I reviewed the mid level provider's note and agree with the documented findings and plan of care. I have reviewed the emergency nurses triage note. I agree with the chief complaint, past medical history, past surgical history, allergies, medications, social and family history as documented unless otherwise noted below. CHIEF COMPLAINT       Chief Complaint   Patient presents with    Dysuria     X1 week, no blood per pt         PAST MEDICAL HISTORY    has a past medical history of Hyperlipidemia, Hypertension, and PSA elevation. SURGICAL HISTORY      has a past surgical history that includes Prostate biopsy and Prostate biopsy (N/A, 6/2/2021). CURRENT MEDICATIONS       Discharge Medication List as of 12/9/2023  1:10 PM        CONTINUE these medications which have NOT CHANGED    Details   hydroCHLOROthiazide (HYDRODIURIL) 12.5 MG tablet Take 1 tablet by mouth dailyHistorical Med      pantoprazole (PROTONIX) 40 MG tablet Take 1 tablet by mouth dailyHistorical Med      amLODIPine (NORVASC) 5 MG tablet TAKE 1 TABLET BY MOUTH DAILY, Disp-90 tablet, R-0**Patient requests 90 days supply**Normal             ALLERGIES     is allergic to amitiza [lubiprostone], armoracia rusticana ext (horseradish), coconut (cocos nucifera), esomeprazole magnesium, and pcn [penicillins].       Condition on Disposition    Improved    PATIENT REFERRED TO:  Sepideh Larry MD  [address] [place] [address]  [phone]    Schedule an appointment as soon as possible for a visit in 2 days        DISCHARGE MEDICATIONS:  Discharge Medication List as of 12/9/2023  1:10 PM

## 2024-02-28 RX ORDER — HYDROCHLOROTHIAZIDE 12.5 MG/1
12.5 TABLET ORAL
Qty: 90 TABLET | Refills: 1 | Status: SHIPPED | OUTPATIENT
Start: 2024-02-28

## 2024-02-28 RX ORDER — AMLODIPINE BESYLATE 5 MG/1
5 TABLET ORAL DAILY
Qty: 90 TABLET | Refills: 1 | Status: SHIPPED | OUTPATIENT
Start: 2024-02-28

## 2024-02-28 NOTE — TELEPHONE ENCOUNTER
Bandar Zapien is calling to request a refill on the following medication(s):    Last Visit Date (If Applicable):  9/13/2023    Next Visit Date:    5/1/2024    Medication Request:  Requested Prescriptions     Pending Prescriptions Disp Refills    hydroCHLOROthiazide 12.5 MG tablet 90 tablet 1     Sig: Take 1 tablet by mouth daily    amLODIPine (NORVASC) 5 MG tablet 90 tablet 1     Sig: Take 1 tablet by mouth daily

## 2024-05-01 ENCOUNTER — HOSPITAL ENCOUNTER (OUTPATIENT)
Age: 69
Setting detail: SPECIMEN
Discharge: HOME | End: 2024-05-01

## 2024-05-01 ENCOUNTER — OFFICE VISIT (OUTPATIENT)
Dept: INTERNAL MEDICINE CLINIC | Age: 69
End: 2024-05-01
Payer: MEDICARE

## 2024-05-01 VITALS
BODY MASS INDEX: 42.25 KG/M2 | WEIGHT: 301.8 LBS | HEIGHT: 71 IN | OXYGEN SATURATION: 98 % | TEMPERATURE: 98.4 F | SYSTOLIC BLOOD PRESSURE: 152 MMHG | RESPIRATION RATE: 16 BRPM | DIASTOLIC BLOOD PRESSURE: 78 MMHG | HEART RATE: 85 BPM

## 2024-05-01 DIAGNOSIS — M54.50 ACUTE MIDLINE LOW BACK PAIN WITHOUT SCIATICA: ICD-10-CM

## 2024-05-01 DIAGNOSIS — N39.0 URINARY TRACT INFECTION IN MALE: Primary | ICD-10-CM

## 2024-05-01 DIAGNOSIS — I10 ESSENTIAL HYPERTENSION: ICD-10-CM

## 2024-05-01 DIAGNOSIS — N39.0 URINARY TRACT INFECTION IN MALE: ICD-10-CM

## 2024-05-01 DIAGNOSIS — E78.1 HIGH TRIGLYCERIDES: ICD-10-CM

## 2024-05-01 LAB
BILIRUBIN, POC: NEGATIVE
BLOOD URINE, POC: NEGATIVE
CLARITY, POC: CLEAR
COLOR, POC: YELLOW
GLUCOSE URINE, POC: NEGATIVE
KETONES, POC: NEGATIVE
LEUKOCYTE EST, POC: ABNORMAL
NITRITE, POC: NEGATIVE
PH, POC: 7
PROTEIN, POC: ABNORMAL
SPECIFIC GRAVITY, POC: 1.02
UROBILINOGEN, POC: 0.2

## 2024-05-01 PROCEDURE — 1123F ACP DISCUSS/DSCN MKR DOCD: CPT | Performed by: INTERNAL MEDICINE

## 2024-05-01 PROCEDURE — 3017F COLORECTAL CA SCREEN DOC REV: CPT | Performed by: INTERNAL MEDICINE

## 2024-05-01 PROCEDURE — G8417 CALC BMI ABV UP PARAM F/U: HCPCS | Performed by: INTERNAL MEDICINE

## 2024-05-01 PROCEDURE — 1036F TOBACCO NON-USER: CPT | Performed by: INTERNAL MEDICINE

## 2024-05-01 PROCEDURE — 81002 URINALYSIS NONAUTO W/O SCOPE: CPT | Performed by: INTERNAL MEDICINE

## 2024-05-01 PROCEDURE — 3078F DIAST BP <80 MM HG: CPT | Performed by: INTERNAL MEDICINE

## 2024-05-01 PROCEDURE — G8427 DOCREV CUR MEDS BY ELIG CLIN: HCPCS | Performed by: INTERNAL MEDICINE

## 2024-05-01 PROCEDURE — 99214 OFFICE O/P EST MOD 30 MIN: CPT | Performed by: INTERNAL MEDICINE

## 2024-05-01 PROCEDURE — 3077F SYST BP >= 140 MM HG: CPT | Performed by: INTERNAL MEDICINE

## 2024-05-01 RX ORDER — CIPROFLOXACIN 500 MG/1
500 TABLET, FILM COATED ORAL 2 TIMES DAILY
Qty: 14 TABLET | Refills: 0 | Status: SHIPPED | OUTPATIENT
Start: 2024-05-01 | End: 2024-05-08

## 2024-05-01 RX ORDER — PREDNISONE 10 MG/1
10 TABLET ORAL
Qty: 15 TABLET | Refills: 0 | Status: SHIPPED | OUTPATIENT
Start: 2024-05-01 | End: 2024-05-06

## 2024-05-01 ASSESSMENT — PATIENT HEALTH QUESTIONNAIRE - PHQ9: DEPRESSION UNABLE TO ASSESS: PT REFUSES

## 2024-05-01 NOTE — PROGRESS NOTES
Bandar Zapien is a 68 y.o. male who presents for   Chief Complaint   Patient presents with    Hypertension     Follow up    Health Maintenance     Awv, psa, rsv, d screen, hep c    Lower Back Pain     Patient having some lower back pain; going on for 3 weeks    Urinary Pain     Patient states having some pain when urinating; also strong odor     Discuss Medications     Patient refused to go over medications    and follow up of chronic medical problems.  Patient Active Problem List   Diagnosis    Angio-edema    Hyperlipidemia    Hypertension    PSA elevation    Irritable bowel syndrome with constipation    Urinary tract infection in male     HPI  Here for evaluation of urinary frequency and burning going on for the last few days and also complaining of low back pain going on for the last 3 weeks    Current Outpatient Medications   Medication Sig Dispense Refill    hydroCHLOROthiazide 12.5 MG tablet Take 1 tablet by mouth daily 90 tablet 1    amLODIPine (NORVASC) 5 MG tablet Take 1 tablet by mouth daily 90 tablet 1    pantoprazole (PROTONIX) 40 MG tablet Take 1 tablet by mouth daily       No current facility-administered medications for this visit.       Allergies   Allergen Reactions    Amitiza [Lubiprostone] Angioedema    Armoracia Rusticana Ext (Horseradish) Hives    Coconut (Cocos Nucifera)     Esomeprazole Magnesium       Also a product that is in Nexium, patient unsure of name    Pcn [Penicillins]        Past Medical History:   Diagnosis Date    Hyperlipidemia     Hypertension     PSA elevation        Past Surgical History:   Procedure Laterality Date    PROSTATE BIOPSY      PROSTATE BIOPSY N/A 6/2/2021    PROSTATE BIOPSY WITH ULTRASOUND, OFFICE NOTIFYING ULTRASOUND performed by Trung Boles MD at [facility] OR       Family History   Problem Relation Age of Onset    Diabetes Mother     Diabetes Father      ROS  Constitutional:  Negative for fatigue, loss of appetite and unexpected weight change  HEENT

## 2024-05-03 LAB
MICROORGANISM SPEC CULT: NORMAL
SPECIMEN DESCRIPTION: NORMAL

## 2024-06-18 ENCOUNTER — TELEPHONE (OUTPATIENT)
Dept: FAMILY MEDICINE CLINIC | Age: 69
End: 2024-06-18

## 2024-06-18 NOTE — TELEPHONE ENCOUNTER
Patient has body aches, fever ( but could not take his temp), headache  and cough x 1 day. He tested negative for Covid but his live in girlfriend did test positive yesterday. Please advise.      Patient uses [employer] [facility] [employer].

## 2024-06-18 NOTE — TELEPHONE ENCOUNTER
Pt.'s Girlfriend called stating she is Dx. With covid yesterday at [facility] ER and prescribed Plaxovid. Pt woke up this am with same symptoms body aches fever and she is asking if Plaxovid can be sent in for him please advise. ( Did advise pt to stay hydrated, tylenol,advil for fever/ body aches and rest)

## 2024-06-18 NOTE — TELEPHONE ENCOUNTER
I called and left message that patient per Dr Em no need for Paxlovid , patient should take Tylenol as needed stay hydrated and go to ER if symptoms get worse.

## 2024-06-24 ENCOUNTER — TELEPHONE (OUTPATIENT)
Dept: FAMILY MEDICINE CLINIC | Age: 69
End: 2024-06-24

## 2024-06-24 NOTE — TELEPHONE ENCOUNTER
Patient tested positive for covid over the weekend    Is asking for paxlovid to be called in?    Please advise

## 2024-07-05 ENCOUNTER — TELEPHONE (OUTPATIENT)
Dept: FAMILY MEDICINE CLINIC | Age: 69
End: 2024-07-05

## 2024-07-05 NOTE — TELEPHONE ENCOUNTER
Patients wife states patient is still having the cough after medication,and wants to know can he get another round of medication.

## 2024-07-30 ENCOUNTER — HOSPITAL ENCOUNTER (OUTPATIENT)
Age: 69
Discharge: HOME | End: 2024-07-30
Payer: MEDICARE

## 2024-07-30 DIAGNOSIS — M54.50 ACUTE MIDLINE LOW BACK PAIN WITHOUT SCIATICA: ICD-10-CM

## 2024-07-30 DIAGNOSIS — E78.1 HIGH TRIGLYCERIDES: ICD-10-CM

## 2024-07-30 DIAGNOSIS — I10 ESSENTIAL HYPERTENSION: ICD-10-CM

## 2024-07-30 DIAGNOSIS — N39.0 URINARY TRACT INFECTION IN MALE: ICD-10-CM

## 2024-07-30 LAB
ALBUMIN SERPL-MCNC: 4.3 G/DL (ref 3.5–5.2)
ALBUMIN/GLOB SERPL: 2 {RATIO} (ref 1–2.5)
ALP SERPL-CCNC: 80 U/L (ref 40–129)
ALT SERPL-CCNC: 19 U/L (ref 10–50)
ANION GAP SERPL CALCULATED.3IONS-SCNC: 11 MMOL/L (ref 9–16)
AST SERPL-CCNC: 19 U/L (ref 10–50)
BILIRUB SERPL-MCNC: 0.6 MG/DL (ref 0–1.2)
BUN SERPL-MCNC: 12 MG/DL (ref 8–23)
CALCIUM SERPL-MCNC: 9.1 MG/DL (ref 8.6–10.4)
CHLORIDE SERPL-SCNC: 106 MMOL/L (ref 98–107)
CHOLEST SERPL-MCNC: 164 MG/DL (ref 0–199)
CHOLESTEROL/HDL RATIO: 5
CO2 SERPL-SCNC: 25 MMOL/L (ref 20–31)
CREAT SERPL-MCNC: 1.1 MG/DL (ref 0.7–1.2)
ERYTHROCYTE [DISTWIDTH] IN BLOOD BY AUTOMATED COUNT: 12.4 % (ref 11.8–14.4)
GFR, ESTIMATED: 78 ML/MIN/1.73M2
GLUCOSE SERPL-MCNC: 107 MG/DL (ref 74–99)
HCT VFR BLD AUTO: 44 % (ref 40.7–50.3)
HDLC SERPL-MCNC: 35 MG/DL
HGB BLD-MCNC: 14.5 G/DL (ref 13–17)
LDLC SERPL CALC-MCNC: 69 MG/DL (ref 0–100)
MCH RBC QN AUTO: 29.8 PG (ref 25.2–33.5)
MCHC RBC AUTO-ENTMCNC: 33 G/DL (ref 28.4–34.8)
MCV RBC AUTO: 90.3 FL (ref 82.6–102.9)
NRBC BLD-RTO: 0 PER 100 WBC
PLATELET # BLD AUTO: 238 K/UL (ref 138–453)
PMV BLD AUTO: 10.4 FL (ref 8.1–13.5)
POTASSIUM SERPL-SCNC: 3.8 MMOL/L (ref 3.7–5.3)
PROT SERPL-MCNC: 7 G/DL (ref 6.6–8.7)
RBC # BLD AUTO: 4.87 M/UL (ref 4.21–5.77)
SODIUM SERPL-SCNC: 142 MMOL/L (ref 136–145)
TRIGL SERPL-MCNC: 300 MG/DL
TSH SERPL DL<=0.05 MIU/L-ACNC: 1.87 UIU/ML (ref 0.27–4.2)
VLDLC SERPL CALC-MCNC: 60 MG/DL
WBC OTHER # BLD: 7.4 K/UL (ref 3.5–11.3)

## 2024-07-30 PROCEDURE — 84443 ASSAY THYROID STIM HORMONE: CPT

## 2024-07-30 PROCEDURE — 85027 COMPLETE CBC AUTOMATED: CPT

## 2024-07-30 PROCEDURE — 36415 COLL VENOUS BLD VENIPUNCTURE: CPT

## 2024-07-30 PROCEDURE — 80053 COMPREHEN METABOLIC PANEL: CPT

## 2024-07-30 PROCEDURE — 80061 LIPID PANEL: CPT

## 2024-07-31 ENCOUNTER — OFFICE VISIT (OUTPATIENT)
Dept: FAMILY MEDICINE CLINIC | Age: 69
End: 2024-07-31
Payer: MEDICARE

## 2024-07-31 VITALS
WEIGHT: 292 LBS | BODY MASS INDEX: 41.8 KG/M2 | HEIGHT: 70 IN | HEART RATE: 87 BPM | OXYGEN SATURATION: 98 % | TEMPERATURE: 98.1 F | DIASTOLIC BLOOD PRESSURE: 64 MMHG | SYSTOLIC BLOOD PRESSURE: 134 MMHG

## 2024-07-31 DIAGNOSIS — E78.1 HIGH TRIGLYCERIDES: Primary | ICD-10-CM

## 2024-07-31 DIAGNOSIS — I10 PRIMARY HYPERTENSION: ICD-10-CM

## 2024-07-31 PROCEDURE — 3017F COLORECTAL CA SCREEN DOC REV: CPT | Performed by: INTERNAL MEDICINE

## 2024-07-31 PROCEDURE — G8427 DOCREV CUR MEDS BY ELIG CLIN: HCPCS | Performed by: INTERNAL MEDICINE

## 2024-07-31 PROCEDURE — 3075F SYST BP GE 130 - 139MM HG: CPT | Performed by: INTERNAL MEDICINE

## 2024-07-31 PROCEDURE — G8417 CALC BMI ABV UP PARAM F/U: HCPCS | Performed by: INTERNAL MEDICINE

## 2024-07-31 PROCEDURE — 1123F ACP DISCUSS/DSCN MKR DOCD: CPT | Performed by: INTERNAL MEDICINE

## 2024-07-31 PROCEDURE — 3078F DIAST BP <80 MM HG: CPT | Performed by: INTERNAL MEDICINE

## 2024-07-31 PROCEDURE — 99214 OFFICE O/P EST MOD 30 MIN: CPT | Performed by: INTERNAL MEDICINE

## 2024-07-31 PROCEDURE — 1036F TOBACCO NON-USER: CPT | Performed by: INTERNAL MEDICINE

## 2024-07-31 RX ORDER — EZETIMIBE 10 MG/1
10 TABLET ORAL DAILY
Qty: 90 TABLET | Refills: 0 | Status: SHIPPED | OUTPATIENT
Start: 2024-07-31

## 2024-07-31 SDOH — ECONOMIC STABILITY: INCOME INSECURITY: HOW HARD IS IT FOR YOU TO PAY FOR THE VERY BASICS LIKE FOOD, HOUSING, MEDICAL CARE, AND HEATING?: NOT VERY HARD

## 2024-07-31 SDOH — ECONOMIC STABILITY: HOUSING INSECURITY
IN THE LAST 12 MONTHS, WAS THERE A TIME WHEN YOU DID NOT HAVE A STEADY PLACE TO SLEEP OR SLEPT IN A SHELTER (INCLUDING NOW)?: NO

## 2024-07-31 SDOH — ECONOMIC STABILITY: FOOD INSECURITY: WITHIN THE PAST 12 MONTHS, THE FOOD YOU BOUGHT JUST DIDN'T LAST AND YOU DIDN'T HAVE MONEY TO GET MORE.: NEVER TRUE

## 2024-07-31 SDOH — ECONOMIC STABILITY: FOOD INSECURITY: WITHIN THE PAST 12 MONTHS, YOU WORRIED THAT YOUR FOOD WOULD RUN OUT BEFORE YOU GOT MONEY TO BUY MORE.: NEVER TRUE

## 2024-07-31 ASSESSMENT — PATIENT HEALTH QUESTIONNAIRE - PHQ9
1. LITTLE INTEREST OR PLEASURE IN DOING THINGS: NOT AT ALL
SUM OF ALL RESPONSES TO PHQ9 QUESTIONS 1 & 2: 0
SUM OF ALL RESPONSES TO PHQ QUESTIONS 1-9: 0
2. FEELING DOWN, DEPRESSED OR HOPELESS: NOT AT ALL

## 2024-07-31 NOTE — PROGRESS NOTES
Bandar Zapien is a 68 y.o. male who presents for   Chief Complaint   Patient presents with    Hypertension     4 month follow up     and follow up of chronic medical problems.  Patient Active Problem List   Diagnosis    Angio-edema    Hyperlipidemia    Hypertension    PSA elevation    Irritable bowel syndrome with constipation    Urinary tract infection in male     HPI  Here for follow-up on blood pressure and cholesterol denies any new complaints    Current Outpatient Medications   Medication Sig Dispense Refill    ezetimibe (ZETIA) 10 MG tablet Take 1 tablet by mouth daily 90 tablet 0    amLODIPine (NORVASC) 5 MG tablet Take 1 tablet by mouth daily 90 tablet 1    pantoprazole (PROTONIX) 40 MG tablet Take 1 tablet by mouth daily       No current facility-administered medications for this visit.       Allergies   Allergen Reactions    Amitiza [Lubiprostone] Angioedema    Armoracia Rusticana Ext (Horseradish) Hives    Coconut (Cocos Nucifera)     Esomeprazole Magnesium       Also a product that is in Nexium, patient unsure of name    Pcn [Penicillins]        Past Medical History:   Diagnosis Date    Hyperlipidemia     Hypertension     PSA elevation        Past Surgical History:   Procedure Laterality Date    PROSTATE BIOPSY      PROSTATE BIOPSY N/A 6/2/2021    PROSTATE BIOPSY WITH ULTRASOUND, OFFICE NOTIFYING ULTRASOUND performed by Trung Boles MD at [facility] OR       Family History   Problem Relation Age of Onset    Diabetes Mother     Diabetes Father      ROS  Constitutional:  Negative for fatigue, loss of appetite and unexpected weight change  HEENT            : Negative for neck stiffness and pain, no congestion or sinus pressure  Eyes                : No visual disturbance or pain  Cardiovascular: No chest pain or palpitations or leg swelling  Respiratory      : Negative for cough, shortness of breath or wheezing  Gastrointestinal: Negative for abdominal pain, constipation or diarrhea and bloating No nausea or

## 2024-08-26 RX ORDER — AMLODIPINE BESYLATE 5 MG/1
5 TABLET ORAL DAILY
Qty: 90 TABLET | Refills: 1 | Status: SHIPPED | OUTPATIENT
Start: 2024-08-26

## 2024-08-26 NOTE — TELEPHONE ENCOUNTER
Bandar Zapien is calling to request a refill on the following medication(s):    Medication Request:  Requested Prescriptions     Pending Prescriptions Disp Refills    amLODIPine (NORVASC) 5 MG tablet [Pharmacy Med Name: AMLODIPINE BESYLATE 5MG TABLETS] 90 tablet 1     Sig: TAKE 1 TABLET BY MOUTH DAILY       Last Visit Date (If Applicable):  7/31/2024    Next Visit Date:    10/30/2024

## 2025-03-04 ENCOUNTER — OFFICE VISIT (OUTPATIENT)
Age: 70
End: 2025-03-04
Payer: MEDICARE

## 2025-03-04 VITALS
SYSTOLIC BLOOD PRESSURE: 189 MMHG | DIASTOLIC BLOOD PRESSURE: 113 MMHG | WEIGHT: 282 LBS | HEART RATE: 81 BPM | HEIGHT: 70 IN | BODY MASS INDEX: 40.37 KG/M2 | OXYGEN SATURATION: 98 % | TEMPERATURE: 98.4 F

## 2025-03-04 DIAGNOSIS — L91.8 INFLAMED ACROCHORDON: Primary | ICD-10-CM

## 2025-03-04 DIAGNOSIS — L21.9 SEBORRHEIC DERMATITIS: ICD-10-CM

## 2025-03-04 PROCEDURE — 99204 OFFICE O/P NEW MOD 45 MIN: CPT | Performed by: PHYSICIAN ASSISTANT

## 2025-03-04 PROCEDURE — 1123F ACP DISCUSS/DSCN MKR DOCD: CPT | Performed by: PHYSICIAN ASSISTANT

## 2025-03-04 PROCEDURE — G8427 DOCREV CUR MEDS BY ELIG CLIN: HCPCS | Performed by: PHYSICIAN ASSISTANT

## 2025-03-04 PROCEDURE — 3077F SYST BP >= 140 MM HG: CPT | Performed by: PHYSICIAN ASSISTANT

## 2025-03-04 PROCEDURE — 3080F DIAST BP >= 90 MM HG: CPT | Performed by: PHYSICIAN ASSISTANT

## 2025-03-04 PROCEDURE — G8417 CALC BMI ABV UP PARAM F/U: HCPCS | Performed by: PHYSICIAN ASSISTANT

## 2025-03-04 PROCEDURE — 1036F TOBACCO NON-USER: CPT | Performed by: PHYSICIAN ASSISTANT

## 2025-03-04 PROCEDURE — 1159F MED LIST DOCD IN RCRD: CPT | Performed by: PHYSICIAN ASSISTANT

## 2025-03-04 PROCEDURE — 3017F COLORECTAL CA SCREEN DOC REV: CPT | Performed by: PHYSICIAN ASSISTANT

## 2025-03-04 RX ORDER — KETOCONAZOLE 20 MG/G
CREAM TOPICAL
Qty: 60 G | Refills: 4 | Status: ON HOLD | OUTPATIENT
Start: 2025-03-04

## 2025-03-04 RX ORDER — CLOBETASOL PROPIONATE 0.5 MG/ML
SOLUTION TOPICAL
Qty: 50 ML | Refills: 3 | Status: ON HOLD | OUTPATIENT
Start: 2025-03-04

## 2025-03-04 RX ORDER — KETOCONAZOLE 20 MG/ML
SHAMPOO, SUSPENSION TOPICAL
Qty: 120 ML | Refills: 2 | Status: ON HOLD | OUTPATIENT
Start: 2025-03-04

## 2025-03-04 RX ORDER — TRIAMCINOLONE ACETONIDE 0.25 MG/G
CREAM TOPICAL
Qty: 80 G | Refills: 3 | Status: ON HOLD | OUTPATIENT
Start: 2025-03-04

## 2025-03-04 NOTE — PROGRESS NOTES
affected regions of face twice daily, as needed, for scaling/itching.  Combine with triamcinolone 0.025% cream, 1:1, when using.  Dispense: 60 g; Refill: 4  - triamcinolone (KENALOG) 0.025 % cream; Apply to affected regions of face twice daily, as needed, for scaling/itching.  Combine with ketoconazole cream, 1:1, when using.  Dispense: 80 g; Refill: 3      RTC 3M    Future Appointments   Date Time Provider Department Center   6/11/2025 11:30 AM [facility]         There are no Patient Instructions on file for this visit.      Electronically signed by Dariel Walton PA-C on 3/4/25 at 1:14 PM EST

## 2025-03-08 ENCOUNTER — HOSPITAL ENCOUNTER (INPATIENT)
Age: 70
LOS: 5 days | Discharge: HOME | DRG: 660 | End: 2025-03-13
Attending: EMERGENCY MEDICINE | Admitting: INTERNAL MEDICINE
Payer: MEDICARE

## 2025-03-08 ENCOUNTER — APPOINTMENT (OUTPATIENT)
Dept: CT IMAGING | Age: 70
DRG: 660 | End: 2025-03-08
Payer: MEDICARE

## 2025-03-08 DIAGNOSIS — R10.84 GENERALIZED ABDOMINAL PAIN: Primary | ICD-10-CM

## 2025-03-08 DIAGNOSIS — N13.2 HYDRONEPHROSIS WITH RENAL AND URETERAL CALCULUS OBSTRUCTION: ICD-10-CM

## 2025-03-08 DIAGNOSIS — N20.0 KIDNEY STONE: ICD-10-CM

## 2025-03-08 PROBLEM — K56.609 SBO (SMALL BOWEL OBSTRUCTION) (HCC): Status: ACTIVE | Noted: 2025-03-08

## 2025-03-08 PROBLEM — K92.2 GI BLEED: Status: ACTIVE | Noted: 2025-03-08

## 2025-03-08 LAB
ALBUMIN SERPL-MCNC: 4.8 G/DL (ref 3.5–5.2)
ALBUMIN/GLOB SERPL: 1.3 {RATIO} (ref 1–2.5)
ALP SERPL-CCNC: 102 U/L (ref 40–129)
ALT SERPL-CCNC: 30 U/L (ref 10–50)
ANION GAP SERPL CALCULATED.3IONS-SCNC: 17 MMOL/L (ref 9–16)
AST SERPL-CCNC: 27 U/L (ref 10–50)
BACTERIA URNS QL MICRO: ABNORMAL
BASOPHILS # BLD: 0.04 K/UL (ref 0–0.2)
BASOPHILS NFR BLD: 0 % (ref 0–2)
BILIRUB SERPL-MCNC: 0.7 MG/DL (ref 0–1.2)
BILIRUB UR QL STRIP: NEGATIVE
BUN SERPL-MCNC: 19 MG/DL (ref 8–23)
CALCIUM SERPL-MCNC: 9.8 MG/DL (ref 8.8–10.2)
CHLORIDE SERPL-SCNC: 104 MMOL/L (ref 98–107)
CLARITY UR: CLEAR
CO2 SERPL-SCNC: 19 MMOL/L (ref 20–31)
COLOR UR: YELLOW
CREAT SERPL-MCNC: 1.2 MG/DL (ref 0.7–1.2)
EOSINOPHIL # BLD: 0.23 K/UL (ref 0–0.44)
EOSINOPHILS RELATIVE PERCENT: 2 % (ref 1–4)
EPI CELLS #/AREA URNS HPF: ABNORMAL /HPF (ref 0–5)
ERYTHROCYTE [DISTWIDTH] IN BLOOD BY AUTOMATED COUNT: 12.4 % (ref 11.8–14.4)
GFR, ESTIMATED: 65 ML/MIN/1.73M2
GLUCOSE SERPL-MCNC: 149 MG/DL (ref 82–115)
GLUCOSE UR STRIP-MCNC: NEGATIVE MG/DL
HCT VFR BLD AUTO: 51.9 % (ref 40.7–50.3)
HCT VFR BLD AUTO: 55.3 % (ref 40.7–50.3)
HGB BLD-MCNC: 17.5 G/DL (ref 13–17)
HGB BLD-MCNC: 18.8 G/DL (ref 13–17)
HGB UR QL STRIP.AUTO: ABNORMAL
IMM GRANULOCYTES # BLD AUTO: 0.04 K/UL (ref 0–0.3)
IMM GRANULOCYTES NFR BLD: 0 %
KETONES UR STRIP-MCNC: NEGATIVE MG/DL
LACTATE BLDV-SCNC: 2.4 MMOL/L (ref 0.5–2.2)
LACTATE BLDV-SCNC: 2.6 MMOL/L (ref 0.5–2.2)
LEUKOCYTE ESTERASE UR QL STRIP: ABNORMAL
LYMPHOCYTES NFR BLD: 1.48 K/UL (ref 1.1–3.7)
LYMPHOCYTES RELATIVE PERCENT: 11 % (ref 24–43)
MCH RBC QN AUTO: 30.2 PG (ref 25.2–33.5)
MCHC RBC AUTO-ENTMCNC: 34 G/DL (ref 28.4–34.8)
MCV RBC AUTO: 88.9 FL (ref 82.6–102.9)
MONOCYTES NFR BLD: 0.7 K/UL (ref 0.1–1.2)
MONOCYTES NFR BLD: 5 % (ref 3–12)
MUCOUS THREADS URNS QL MICRO: ABNORMAL
NEUTROPHILS NFR BLD: 82 % (ref 36–65)
NEUTS SEG NFR BLD: 10.56 K/UL (ref 1.5–8.1)
NITRITE UR QL STRIP: NEGATIVE
NRBC BLD-RTO: 0 PER 100 WBC
PH UR STRIP: 5.5 [PH] (ref 5–8)
PLATELET # BLD AUTO: 250 K/UL (ref 138–453)
PMV BLD AUTO: 10.6 FL (ref 8.1–13.5)
POTASSIUM SERPL-SCNC: 4.4 MMOL/L (ref 3.7–5.3)
PROT SERPL-MCNC: 8.4 G/DL (ref 6.6–8.7)
PROT UR STRIP-MCNC: ABNORMAL MG/DL
RBC # BLD AUTO: 6.22 M/UL (ref 4.21–5.77)
RBC #/AREA URNS HPF: ABNORMAL /HPF (ref 0–2)
SODIUM SERPL-SCNC: 140 MMOL/L (ref 136–145)
SP GR UR STRIP: 1.02 (ref 1–1.03)
UROBILINOGEN UR STRIP-ACNC: NORMAL EU/DL (ref 0–1)
WBC #/AREA URNS HPF: ABNORMAL /HPF (ref 0–5)
WBC OTHER # BLD: 13.1 K/UL (ref 3.5–11.3)

## 2025-03-08 PROCEDURE — 74174 CTA ABD&PLVS W/CONTRAST: CPT

## 2025-03-08 PROCEDURE — 85025 COMPLETE CBC W/AUTO DIFF WBC: CPT

## 2025-03-08 PROCEDURE — 2580000003 HC RX 258: Performed by: EMERGENCY MEDICINE

## 2025-03-08 PROCEDURE — 36415 COLL VENOUS BLD VENIPUNCTURE: CPT

## 2025-03-08 PROCEDURE — 2580000003 HC RX 258: Performed by: NURSE PRACTITIONER

## 2025-03-08 PROCEDURE — 96375 TX/PRO/DX INJ NEW DRUG ADDON: CPT

## 2025-03-08 PROCEDURE — 2500000003 HC RX 250 WO HCPCS: Performed by: NURSE PRACTITIONER

## 2025-03-08 PROCEDURE — 2500000003 HC RX 250 WO HCPCS: Performed by: EMERGENCY MEDICINE

## 2025-03-08 PROCEDURE — 6360000002 HC RX W HCPCS: Performed by: EMERGENCY MEDICINE

## 2025-03-08 PROCEDURE — 6360000004 HC RX CONTRAST MEDICATION: Performed by: EMERGENCY MEDICINE

## 2025-03-08 PROCEDURE — 85014 HEMATOCRIT: CPT

## 2025-03-08 PROCEDURE — 99223 1ST HOSP IP/OBS HIGH 75: CPT | Performed by: NURSE PRACTITIONER

## 2025-03-08 PROCEDURE — 83605 ASSAY OF LACTIC ACID: CPT

## 2025-03-08 PROCEDURE — 99285 EMERGENCY DEPT VISIT HI MDM: CPT

## 2025-03-08 PROCEDURE — 2060000000 HC ICU INTERMEDIATE R&B

## 2025-03-08 PROCEDURE — 6360000002 HC RX W HCPCS: Performed by: NURSE PRACTITIONER

## 2025-03-08 PROCEDURE — 87040 BLOOD CULTURE FOR BACTERIA: CPT

## 2025-03-08 PROCEDURE — 80053 COMPREHEN METABOLIC PANEL: CPT

## 2025-03-08 PROCEDURE — 87086 URINE CULTURE/COLONY COUNT: CPT

## 2025-03-08 PROCEDURE — 81001 URINALYSIS AUTO W/SCOPE: CPT

## 2025-03-08 PROCEDURE — 85018 HEMOGLOBIN: CPT

## 2025-03-08 PROCEDURE — 6370000000 HC RX 637 (ALT 250 FOR IP): Performed by: NURSE PRACTITIONER

## 2025-03-08 PROCEDURE — 96365 THER/PROPH/DIAG IV INF INIT: CPT

## 2025-03-08 RX ORDER — LEVOFLOXACIN 5 MG/ML
750 INJECTION, SOLUTION INTRAVENOUS EVERY 24 HOURS
Status: CANCELLED | OUTPATIENT
Start: 2025-03-08 | End: 2025-03-13

## 2025-03-08 RX ORDER — MORPHINE SULFATE 2 MG/ML
2 INJECTION, SOLUTION INTRAMUSCULAR; INTRAVENOUS
Status: DISCONTINUED | OUTPATIENT
Start: 2025-03-08 | End: 2025-03-13 | Stop reason: HOSPADM

## 2025-03-08 RX ORDER — ACETAMINOPHEN 650 MG/1
650 SUPPOSITORY RECTAL EVERY 6 HOURS PRN
Status: DISCONTINUED | OUTPATIENT
Start: 2025-03-08 | End: 2025-03-13 | Stop reason: HOSPADM

## 2025-03-08 RX ORDER — ONDANSETRON 2 MG/ML
4 INJECTION INTRAMUSCULAR; INTRAVENOUS ONCE
Status: COMPLETED | OUTPATIENT
Start: 2025-03-08 | End: 2025-03-08

## 2025-03-08 RX ORDER — IOPAMIDOL 755 MG/ML
100 INJECTION, SOLUTION INTRAVASCULAR
Status: COMPLETED | OUTPATIENT
Start: 2025-03-08 | End: 2025-03-08

## 2025-03-08 RX ORDER — ACETAMINOPHEN 325 MG/1
650 TABLET ORAL EVERY 6 HOURS PRN
Status: DISCONTINUED | OUTPATIENT
Start: 2025-03-08 | End: 2025-03-13 | Stop reason: HOSPADM

## 2025-03-08 RX ORDER — INSULIN LISPRO 100 [IU]/ML
0-4 INJECTION, SOLUTION INTRAVENOUS; SUBCUTANEOUS EVERY 6 HOURS SCHEDULED
Status: DISCONTINUED | OUTPATIENT
Start: 2025-03-09 | End: 2025-03-13 | Stop reason: HOSPADM

## 2025-03-08 RX ORDER — ONDANSETRON 4 MG/1
4 TABLET, ORALLY DISINTEGRATING ORAL EVERY 8 HOURS PRN
Status: DISCONTINUED | OUTPATIENT
Start: 2025-03-08 | End: 2025-03-13 | Stop reason: HOSPADM

## 2025-03-08 RX ORDER — 0.9 % SODIUM CHLORIDE 0.9 %
1000 INTRAVENOUS SOLUTION INTRAVENOUS ONCE
Status: COMPLETED | OUTPATIENT
Start: 2025-03-08 | End: 2025-03-08

## 2025-03-08 RX ORDER — POLYETHYLENE GLYCOL 3350 17 G/17G
17 POWDER, FOR SOLUTION ORAL DAILY PRN
Status: DISCONTINUED | OUTPATIENT
Start: 2025-03-08 | End: 2025-03-13 | Stop reason: HOSPADM

## 2025-03-08 RX ORDER — 0.9 % SODIUM CHLORIDE 0.9 %
80 INTRAVENOUS SOLUTION INTRAVENOUS ONCE
Status: COMPLETED | OUTPATIENT
Start: 2025-03-08 | End: 2025-03-08

## 2025-03-08 RX ORDER — SODIUM CHLORIDE 9 MG/ML
INJECTION, SOLUTION INTRAVENOUS CONTINUOUS
Status: DISCONTINUED | OUTPATIENT
Start: 2025-03-08 | End: 2025-03-11

## 2025-03-08 RX ORDER — METOPROLOL TARTRATE 1 MG/ML
5 INJECTION, SOLUTION INTRAVENOUS EVERY 6 HOURS PRN
Status: DISCONTINUED | OUTPATIENT
Start: 2025-03-08 | End: 2025-03-08

## 2025-03-08 RX ORDER — SODIUM CHLORIDE 0.9 % (FLUSH) 0.9 %
10 SYRINGE (ML) INJECTION ONCE
Status: COMPLETED | OUTPATIENT
Start: 2025-03-08 | End: 2025-03-08

## 2025-03-08 RX ORDER — METOPROLOL TARTRATE 1 MG/ML
5 INJECTION, SOLUTION INTRAVENOUS EVERY 6 HOURS
Status: DISCONTINUED | OUTPATIENT
Start: 2025-03-08 | End: 2025-03-11

## 2025-03-08 RX ORDER — DEXTROSE MONOHYDRATE 100 MG/ML
INJECTION, SOLUTION INTRAVENOUS CONTINUOUS PRN
Status: DISCONTINUED | OUTPATIENT
Start: 2025-03-08 | End: 2025-03-13 | Stop reason: HOSPADM

## 2025-03-08 RX ORDER — ONDANSETRON 2 MG/ML
4 INJECTION INTRAMUSCULAR; INTRAVENOUS EVERY 6 HOURS PRN
Status: DISCONTINUED | OUTPATIENT
Start: 2025-03-08 | End: 2025-03-13 | Stop reason: HOSPADM

## 2025-03-08 RX ORDER — MORPHINE SULFATE 4 MG/ML
4 INJECTION, SOLUTION INTRAMUSCULAR; INTRAVENOUS
Status: DISCONTINUED | OUTPATIENT
Start: 2025-03-08 | End: 2025-03-13 | Stop reason: HOSPADM

## 2025-03-08 RX ORDER — TAMSULOSIN HYDROCHLORIDE 0.4 MG/1
0.4 CAPSULE ORAL DAILY
Status: DISCONTINUED | OUTPATIENT
Start: 2025-03-08 | End: 2025-03-13 | Stop reason: HOSPADM

## 2025-03-08 RX ORDER — LABETALOL HYDROCHLORIDE 5 MG/ML
20 INJECTION, SOLUTION INTRAVENOUS ONCE
Status: COMPLETED | OUTPATIENT
Start: 2025-03-08 | End: 2025-03-08

## 2025-03-08 RX ORDER — MORPHINE SULFATE 2 MG/ML
2 INJECTION, SOLUTION INTRAMUSCULAR; INTRAVENOUS ONCE
Refills: 0 | Status: COMPLETED | OUTPATIENT
Start: 2025-03-08 | End: 2025-03-08

## 2025-03-08 RX ADMIN — SODIUM CHLORIDE: 0.9 INJECTION, SOLUTION INTRAVENOUS at 21:22

## 2025-03-08 RX ADMIN — ONDANSETRON 4 MG: 2 INJECTION, SOLUTION INTRAMUSCULAR; INTRAVENOUS at 15:30

## 2025-03-08 RX ADMIN — MORPHINE SULFATE 2 MG: 2 INJECTION, SOLUTION INTRAMUSCULAR; INTRAVENOUS at 15:30

## 2025-03-08 RX ADMIN — WATER 1000 MG: 1 INJECTION INTRAMUSCULAR; INTRAVENOUS; SUBCUTANEOUS at 19:33

## 2025-03-08 RX ADMIN — SODIUM CHLORIDE 1000 ML: 0.9 INJECTION, SOLUTION INTRAVENOUS at 15:59

## 2025-03-08 RX ADMIN — ONDANSETRON 4 MG: 2 INJECTION, SOLUTION INTRAMUSCULAR; INTRAVENOUS at 21:18

## 2025-03-08 RX ADMIN — SODIUM CHLORIDE 80 MG: 9 INJECTION, SOLUTION INTRAMUSCULAR; INTRAVENOUS; SUBCUTANEOUS at 15:30

## 2025-03-08 RX ADMIN — SODIUM CHLORIDE 80 ML: 9 INJECTION, SOLUTION INTRAVENOUS at 16:49

## 2025-03-08 RX ADMIN — TAMSULOSIN HYDROCHLORIDE 0.4 MG: 0.4 CAPSULE ORAL at 21:22

## 2025-03-08 RX ADMIN — METOPROLOL TARTRATE 5 MG: 5 INJECTION INTRAVENOUS at 21:10

## 2025-03-08 RX ADMIN — IOPAMIDOL 100 ML: 755 INJECTION, SOLUTION INTRAVENOUS at 16:49

## 2025-03-08 RX ADMIN — PANTOPRAZOLE SODIUM 8 MG/HR: 40 INJECTION, POWDER, FOR SOLUTION INTRAVENOUS at 15:41

## 2025-03-08 RX ADMIN — LABETALOL HYDROCHLORIDE 20 MG: 5 INJECTION, SOLUTION INTRAVENOUS at 19:54

## 2025-03-08 RX ADMIN — SODIUM CHLORIDE, PRESERVATIVE FREE 10 ML: 5 INJECTION INTRAVENOUS at 16:49

## 2025-03-08 ASSESSMENT — PAIN SCALES - GENERAL
PAINLEVEL_OUTOF10: 6
PAINLEVEL_OUTOF10: 9

## 2025-03-08 ASSESSMENT — LIFESTYLE VARIABLES
HOW MANY STANDARD DRINKS CONTAINING ALCOHOL DO YOU HAVE ON A TYPICAL DAY: PATIENT DOES NOT DRINK
HOW OFTEN DO YOU HAVE A DRINK CONTAINING ALCOHOL: NEVER

## 2025-03-08 ASSESSMENT — PAIN - FUNCTIONAL ASSESSMENT: PAIN_FUNCTIONAL_ASSESSMENT: 0-10

## 2025-03-08 ASSESSMENT — PAIN DESCRIPTION - LOCATION: LOCATION: ABDOMEN

## 2025-03-08 NOTE — ED PROVIDER NOTES
EMERGENCY DEPARTMENT ENCOUNTER    Pt Name: Bandar Zapien  MRN: [id number]  Birthdate 1955  Date of evaluation: 3/8/25  CHIEF COMPLAINT       Chief Complaint   Patient presents with    Vomiting     X 2 days    Abdominal Pain    Diarrhea    Cough     Reports blood    Rectal Bleeding     HISTORY OF PRESENT ILLNESS   69-year-old male presents emergency room for nausea vomiting abdominal pain that started last night.  Patient went out to eat and the symptoms started a couple of hours later.  Patient states he has noted blood in the stool and vomit.  He reports having had a GI bleed a long time ago.  He also has many colonic polyps that he has had to have numerous colonoscopies and follow-up removal for in the past.             REVIEW OF SYSTEMS     Review of Systems   Gastrointestinal:  Positive for abdominal pain, blood in stool, diarrhea, nausea and vomiting.     PASTMEDICAL HISTORY     Past Medical History:   Diagnosis Date    Hyperlipidemia     Hypertension     PSA elevation      Past Problem List  Patient Active Problem List   Diagnosis Code    Angio-edema T78.3XXA    Hyperlipidemia E78.5    Primary hypertension I10    PSA elevation R97.20    Irritable bowel syndrome with constipation K58.1    Urinary tract infection in male N39.0    Hydronephrosis with renal and ureteral calculus obstruction N13.2    Enteritis K52.9    GI bleed K92.2     SURGICAL HISTORY       Past Surgical History:   Procedure Laterality Date    CYSTOSCOPY N/A 3/10/2025    CYSTOSCOPY ,YEUNG PLACEMENT, PYLOGRAM BILATERAL performed by Sg Solomon MD at [facility] OR    LITHOTRIPSY N/A 3/12/2025    CYSTOSCOPY,   URETEROSCOPY , HOLMIUM LASER LITHOTRIPSY, BLADDER STONE REMOVAL, RETROGRADE PYELOGRAM , RIGHT STENT PLACEMENT performed by Sg Solomon MD at [facility] OR    PROSTATE BIOPSY      PROSTATE BIOPSY N/A 6/2/2021    PROSTATE BIOPSY WITH ULTRASOUND, OFFICE NOTIFYING ULTRASOUND performed by Trung Boles MD at [facility] OR     CURRENT MEDICATIONS

## 2025-03-09 PROBLEM — K52.9 ENTERITIS: Status: ACTIVE | Noted: 2025-03-08

## 2025-03-09 LAB
ANION GAP SERPL CALCULATED.3IONS-SCNC: 12 MMOL/L (ref 9–16)
BASOPHILS # BLD: 0.05 K/UL (ref 0–0.2)
BASOPHILS NFR BLD: 0 % (ref 0–2)
BUN SERPL-MCNC: 19 MG/DL (ref 8–23)
CALCIUM SERPL-MCNC: 8.5 MG/DL (ref 8.8–10.2)
CHLORIDE SERPL-SCNC: 111 MMOL/L (ref 98–107)
CO2 SERPL-SCNC: 19 MMOL/L (ref 20–31)
CREAT SERPL-MCNC: 1.2 MG/DL (ref 0.7–1.2)
EOSINOPHIL # BLD: 0.56 K/UL (ref 0–0.44)
EOSINOPHILS RELATIVE PERCENT: 5 % (ref 1–4)
ERYTHROCYTE [DISTWIDTH] IN BLOOD BY AUTOMATED COUNT: 12.7 % (ref 11.8–14.4)
GFR, ESTIMATED: 68 ML/MIN/1.73M2
GLUCOSE BLD-MCNC: 105 MG/DL (ref 75–110)
GLUCOSE BLD-MCNC: 111 MG/DL (ref 75–110)
GLUCOSE BLD-MCNC: 126 MG/DL (ref 75–110)
GLUCOSE BLD-MCNC: 131 MG/DL (ref 75–110)
GLUCOSE BLD-MCNC: 132 MG/DL (ref 75–110)
GLUCOSE SERPL-MCNC: 136 MG/DL (ref 82–115)
HCT VFR BLD AUTO: 47.2 % (ref 40.7–50.3)
HCT VFR BLD AUTO: 48 % (ref 40.7–50.3)
HCT VFR BLD AUTO: 48.5 % (ref 40.7–50.3)
HGB BLD-MCNC: 15.8 G/DL (ref 13–17)
HGB BLD-MCNC: 15.9 G/DL (ref 13–17)
HGB BLD-MCNC: 16.5 G/DL (ref 13–17)
IMM GRANULOCYTES # BLD AUTO: 0.05 K/UL (ref 0–0.3)
IMM GRANULOCYTES NFR BLD: 0 %
IRON SATN MFR SERPL: 37 % (ref 20–55)
IRON SERPL-MCNC: 100 UG/DL (ref 61–157)
LYMPHOCYTES NFR BLD: 1.54 K/UL (ref 1.1–3.7)
LYMPHOCYTES RELATIVE PERCENT: 13 % (ref 24–43)
MCH RBC QN AUTO: 30.1 PG (ref 25.2–33.5)
MCHC RBC AUTO-ENTMCNC: 33.1 G/DL (ref 28.4–34.8)
MCV RBC AUTO: 90.7 FL (ref 82.6–102.9)
MICROORGANISM SPEC CULT: NO GROWTH
MONOCYTES NFR BLD: 1 K/UL (ref 0.1–1.2)
MONOCYTES NFR BLD: 9 % (ref 3–12)
NEUTROPHILS NFR BLD: 73 % (ref 36–65)
NEUTS SEG NFR BLD: 8.59 K/UL (ref 1.5–8.1)
NRBC BLD-RTO: 0 PER 100 WBC
PLATELET # BLD AUTO: 195 K/UL (ref 138–453)
PMV BLD AUTO: 10.7 FL (ref 8.1–13.5)
POTASSIUM SERPL-SCNC: 3.9 MMOL/L (ref 3.7–5.3)
RBC # BLD AUTO: 5.29 M/UL (ref 4.21–5.77)
SERVICE CMNT-IMP: NORMAL
SODIUM SERPL-SCNC: 142 MMOL/L (ref 136–145)
SPECIMEN DESCRIPTION: NORMAL
TIBC SERPL-MCNC: 267 UG/DL (ref 250–450)
UNSATURATED IRON BINDING CAPACITY: 167 UG/DL (ref 112–347)
WBC OTHER # BLD: 11.8 K/UL (ref 3.5–11.3)

## 2025-03-09 PROCEDURE — 80048 BASIC METABOLIC PNL TOTAL CA: CPT

## 2025-03-09 PROCEDURE — 85014 HEMATOCRIT: CPT

## 2025-03-09 PROCEDURE — 2580000003 HC RX 258: Performed by: NURSE PRACTITIONER

## 2025-03-09 PROCEDURE — 2580000003 HC RX 258: Performed by: EMERGENCY MEDICINE

## 2025-03-09 PROCEDURE — 6360000002 HC RX W HCPCS: Performed by: NURSE PRACTITIONER

## 2025-03-09 PROCEDURE — 83540 ASSAY OF IRON: CPT

## 2025-03-09 PROCEDURE — 99232 SBSQ HOSP IP/OBS MODERATE 35: CPT | Performed by: INTERNAL MEDICINE

## 2025-03-09 PROCEDURE — 82947 ASSAY GLUCOSE BLOOD QUANT: CPT

## 2025-03-09 PROCEDURE — 51798 US URINE CAPACITY MEASURE: CPT

## 2025-03-09 PROCEDURE — 85025 COMPLETE CBC W/AUTO DIFF WBC: CPT

## 2025-03-09 PROCEDURE — 2060000000 HC ICU INTERMEDIATE R&B

## 2025-03-09 PROCEDURE — 83550 IRON BINDING TEST: CPT

## 2025-03-09 PROCEDURE — 85018 HEMOGLOBIN: CPT

## 2025-03-09 PROCEDURE — 36415 COLL VENOUS BLD VENIPUNCTURE: CPT

## 2025-03-09 PROCEDURE — 6360000002 HC RX W HCPCS: Performed by: EMERGENCY MEDICINE

## 2025-03-09 PROCEDURE — 6370000000 HC RX 637 (ALT 250 FOR IP): Performed by: NURSE PRACTITIONER

## 2025-03-09 PROCEDURE — 2500000003 HC RX 250 WO HCPCS: Performed by: NURSE PRACTITIONER

## 2025-03-09 RX ORDER — HYDRALAZINE HYDROCHLORIDE 20 MG/ML
10 INJECTION INTRAMUSCULAR; INTRAVENOUS EVERY 6 HOURS PRN
Status: DISCONTINUED | OUTPATIENT
Start: 2025-03-09 | End: 2025-03-13 | Stop reason: HOSPADM

## 2025-03-09 RX ADMIN — WATER 2000 MG: 1 INJECTION INTRAMUSCULAR; INTRAVENOUS; SUBCUTANEOUS at 15:50

## 2025-03-09 RX ADMIN — SODIUM CHLORIDE: 0.9 INJECTION, SOLUTION INTRAVENOUS at 08:41

## 2025-03-09 RX ADMIN — PANTOPRAZOLE SODIUM 8 MG/HR: 40 INJECTION, POWDER, FOR SOLUTION INTRAVENOUS at 11:13

## 2025-03-09 RX ADMIN — METOPROLOL TARTRATE 5 MG: 5 INJECTION INTRAVENOUS at 05:31

## 2025-03-09 RX ADMIN — METOPROLOL TARTRATE 5 MG: 5 INJECTION INTRAVENOUS at 15:47

## 2025-03-09 RX ADMIN — TAMSULOSIN HYDROCHLORIDE 0.4 MG: 0.4 CAPSULE ORAL at 08:28

## 2025-03-09 RX ADMIN — SODIUM CHLORIDE: 0.9 INJECTION, SOLUTION INTRAVENOUS at 13:22

## 2025-03-09 RX ADMIN — METOPROLOL TARTRATE 5 MG: 5 INJECTION INTRAVENOUS at 08:28

## 2025-03-09 RX ADMIN — PANTOPRAZOLE SODIUM 8 MG/HR: 40 INJECTION, POWDER, FOR SOLUTION INTRAVENOUS at 00:33

## 2025-03-09 RX ADMIN — METOPROLOL TARTRATE 5 MG: 5 INJECTION INTRAVENOUS at 23:40

## 2025-03-09 NOTE — CONSULTS
GASTROENTEROLOGY CONSULT       REASON FOR CONSULT:  blood vomiting and flecks of blood in the toilet    REQUESTING PHYSICIAN:  Phillip Ricardo DO    HISTORY OF PRESENT ILLNESS:    The patient is a 69 y.o. male who presents with with acute n/v increasing abdominal distention since Friday. He then had loose stools and thought he had specks of blood in both vomit and stools. Feel sweaty and feverish when symptoms started. He was tachycardic and hypertensive near admission but had not been able to keep blood pressure meds down at home, this is improved.   CT scan showed an obstructing calculus versus calculi in the proximal right ureter with associated hydroureter and hydronephrosis and there is also concern for ileus versus a partial small bowel obstruction.  His hemoglobin is greater than 17.  BUN and creatinine are 19/1.2.  UA shows a trace leukocyte esterase 5-10 WBCs and some bacteria.  He has been started on Rocephin.   He was seen by surgery and had an NG placed, large amount returned upon placement, but now very minimal, bilious appearance. Now with flatus no pain and non distended. Feels \"day and night better\". Reports EGD and colonoscopy about 3 years ago at [facility] had \" 37 polyps removed\".   No excessive nsaids used. He was started on PPI gtt. Most recent hgb is 15.8, BUN 15 Cr 1.2.       IMPRESSION:  No evidence of active GI bleed.     Obstructing calculus versus calculi in the proximal right ureter with  associated hydroureter and hydronephrosis.     Nonobstructing left nephrolithiasis.     Left bladder calculus.     Diverticulosis without evidence of diverticulitis.     Findings concerning for small bowel ileus versus partial small bowel  obstruction.     Cholelithiasis.     Prostatomegaly.     Additional findings noted above.           Exam Ended: 03/08/25 16:49 EST       08/29/2024 10:20 AM EDT   Table formatting from the original result was not included.  Colonoscopy Procedure Note    Procedure:

## 2025-03-09 NOTE — CARE COORDINATION
Case Management Assessment  Initial Evaluation    Date/Time of Evaluation: 3/9/2025 4:12 PM  Assessment Completed by: BRENNAN GREENE RN    If patient is discharged prior to next notation, then this note serves as note for discharge by case management.    Patient Name: Bandar Zapien                   YOB: 1955  Diagnosis: SBO (small bowel obstruction) ([facility]) [K56.609]                   Date / Time: 3/8/2025  2:47 PM    Patient Admission Status: Inpatient   Readmission Risk (Low < 19, Mod (19-27), High > 27): Readmission Risk Score: 11.1    Current PCP: No primary care provider on file.  PCP verified by CM? Yes    Chart Reviewed: Yes      History Provided by: Patient  Patient Orientation: Alert and Oriented    Patient Cognition: Alert    Hospitalization in the last 30 days (Readmission):  No    If yes, Readmission Assessment in CM Navigator will be completed.    Advance Directives:      Code Status: Full Code   Patient's Primary Decision Maker is: Legal Next of Kin    Primary Decision Maker: Jeremy Zapien - Child - [phone]    Discharge Planning:    Patient lives with: Alone, Other (Comment) (significant other Merry comes and goes) Type of Home: House  Primary Care Giver: Self  Patient Support Systems include: Children, Family Members   Current Financial resources: Medicare  Current community resources: ECF/Home Care  Current services prior to admission: Oxygen Therapy (has concentrator but in his garage. does not use.)            Current DME:              Type of Home Care services:  None    ADLS  Prior functional level: Independent in ADLs/IADLs  Current functional level: Assistance with the following:, Cooking, Housework, Shopping    PT AM-PAC:   /24  OT AM-PAC:   /24    Family can provide assistance at DC: Yes  Would you like Case Management to discuss the discharge plan with any other family members/significant others, and if so, who? Yes (son Jeremy)  Plans to Return to Present

## 2025-03-09 NOTE — PLAN OF CARE
Pt has been admitted for SBO.  NG tube to [facility]- placement verified in ED w gastric contents.   Pt has had complaints of nausea, prn zofran given.   Home med rec initiated, progress documented.   All scheduled medications given, see MAR.  NS infusing at 10ml/hr.  Protonix gtt infusing.   NG output has been minimal throughout shift, gastric contents in tubing.  Multiple watery BMs throughout the night green in color- no blood visible, unable to obtain a stool sample.   CT ABD resulted as pt having a calculus in the R ureter.   Urine output has been consistently strained all night, no stone collected.  Q6H&H continue.  BP elevated this morning, scheduled lopressor given.     Standard safety measures in place. Call light within reach. Bed in locked and lowest position. Safety has been maintained throughout shift.     Problem: Discharge Planning  Goal: Discharge to home or other facility with appropriate resources  Outcome: Progressing     Problem: Pain  Goal: Verbalizes/displays adequate comfort level or baseline comfort level  Outcome: Progressing     Problem: Skin/Tissue Integrity  Goal: Skin integrity remains intact  Description: 1.  Monitor for areas of redness and/or skin breakdown  2.  Assess vascular access sites hourly  3.  Every 4-6 hours minimum:  Change oxygen saturation probe site  4.  Every 4-6 hours:  If on nasal continuous positive airway pressure, respiratory therapy assess nares and determine need for appliance change or resting period  Outcome: Progressing     Problem: Safety - Adult  Goal: Free from fall injury  Outcome: Progressing     Problem: Musculoskeletal - Adult  Goal: Return mobility to safest level of function  Outcome: Progressing  Goal: Return ADL status to a safe level of function  Outcome: Progressing     Problem: Gastrointestinal - Adult  Goal: Minimal or absence of nausea and vomiting  Outcome: Progressing  Goal: Maintains or returns to baseline bowel function  Outcome: Progressing

## 2025-03-09 NOTE — CONSULTS
General Surgery:  Consult Note        PATIENT NAME: Bandar Zapien   YOB: 1955    ADMISSION DATE: 3/8/2025  2:47 PM     Admitting Provider: [unfilled]    Consulted Physician: Kayla     TODAY'S DATE: 3/9/2025    Chief Complaint: Abdominal pain, hematochezia, emesis  Consult Regarding: SBO    HISTORY OF PRESENT ILLNESS:  The patient is a 69 y.o. male  who was admitted yesterday for management of SBO.  The patient reports he actually started feeling unwell 6 days ago with a headache and very high blood pressure uncontrolled on his home medications with systolic blood pressure over 200.  Then, 2 days ago he became violently ill 2 hours after eating dinner with abdominal pain and 16 episodes of diarrhea and at least 18 episodes of emesis and severe nausea.  This prompted him to come to the ED yesterday.  Labs were remarkable for leukocytosis of 13.1 with lactic acid of 2.6.  A CTA abdomen/pelvis was done which showed no evidence of GI bleed, but with an obstructing calculi of the right ureter with hydronephrosis, diverticulosis, and dilated small bowel which was interpreted as ileus versus partial small bowel obstruction.  General surgery was consulted for this reason.    The patient denies any previous abdominal surgeries.  He had a colonoscopy with polypectomy.  He reports history of IBS.      Past Medical History:        Diagnosis Date    Hyperlipidemia     Hypertension     PSA elevation        Past Surgical History:        Procedure Laterality Date    PROSTATE BIOPSY      PROSTATE BIOPSY N/A 6/2/2021    PROSTATE BIOPSY WITH ULTRASOUND, OFFICE NOTIFYING ULTRASOUND performed by Trung Boles MD at [facility] OR       Medications Prior to Admission:   Medications Prior to Admission: ketoconazole (NIZORAL) 2 % shampoo, Apply 3-4 times weekly to scalp, leave on for five minutes prior to washing off (Patient not taking: Reported on 3/8/2025)  clobetasol (TEMOVATE) 0.05 % external solution, Apply to affected

## 2025-03-09 NOTE — PLAN OF CARE
Pt remains safe and free from falls thus far in shift  Fluids running @ 100 ml/hr  Protonix gtt d/c and started IV Protonix daily  IV Rocephin   Sinus rhythm on cardiac monitor  Pt has some right sided flank pain d/t right ureter stone  but denies wanting anything for pain. States feels a lot better than yesterday. Orders to strain urine for stone, no stones noted this shift  Q6 BS, no coverage needed per sliding scale   Pt had 1 small BM and some gas   Urinal at bedside, pt BS multiple times through shift. See previous notes and chart for documentation. PT has hx prostate issues and has to stand up to urinate.  Urology rounded and states to keep patient NPO at midnight and wait for Dr. Solomon to see patient in am for further plan from urology.   Discharge planning in progress, will go home once medically stable  Call light in reach  Bed locked and lowest position  Bed alarm on for safety  Care ongoing        Problem: Discharge Planning  Goal: Discharge to home or other facility with appropriate resources  3/9/2025 1408 by Edna Blackwood RN  Outcome: Progressing     Problem: Pain  Goal: Verbalizes/displays adequate comfort level or baseline comfort level  3/9/2025 1408 by Edna Blackwood RN  Outcome: Progressing     Problem: Skin/Tissue Integrity  Goal: Skin integrity remains intact  Description: 1.  Monitor for areas of redness and/or skin breakdown  2.  Assess vascular access sites hourly  3.  Every 4-6 hours minimum:  Change oxygen saturation probe site  4.  Every 4-6 hours:  If on nasal continuous positive airway pressure, respiratory therapy assess nares and determine need for appliance change or resting period  3/9/2025 1408 by Edna Blackwood, RN  Outcome: Progressing     Problem: Safety - Adult  Goal: Free from fall injury  3/9/2025 1408 by Edna Blackwood, RN  Outcome: Progressing     Problem: Musculoskeletal - Adult  Goal: Return mobility to safest level of function  3/9/2025 1408 by Edna Blackwood,  no chest pain and no edema.

## 2025-03-09 NOTE — H&P
[facility]  Office: [phone]  Parker Glover DO, Sriram Barber DO, Michael Pink DO, Phillip Ricardo DO, Hailey Fan MD, Sherrill Pablo MD, Kaykay Hobbs MD, Radha Kraft MD,  Ben Trejo MD, Rebecca Bucio MD, Leatha Dominique MD,  Bruce Douglas DO, Tresa Oneal MD, Ian Hanson MD, Bandar Glover DO, Misti Pedraza MD,  Delbert Melissa DO, Molly Chavez MD, Rachel Aguilera MD, Haley Coe MD, Edi Parker MD,  Daniel Kang MD, Tony Ferguson MD, Kristy Blackwell MD, Marixa Luo MD, Tariq Malik MD, Prudence Urbano MD, Slick Little DO, Ten Lopez MD, Bruce Peters MD, Mohsin Reza, MD, Shirley Waterhouse, CNP,  Vanesa Pelayo CNP, Slick Chan, CNP,  Oneyda Mayberry, LUIS, Nemo Hernández, CNP, Maxine Hughes, CNP, Falguni Bains, CNP, Rosa Cole CNP, GUILHERME Slater-ESSENCE, Beatriz Teixeira, CNP, Radha Garcia, CNP,  Charlotte Lawrence, CNP, Estelle Horta, CNP, Sonia Tejada, CNP,  Cristiana Simpson, CNS, Wen Diana CNP, Yani Byrne CNP,   Ro Dunn, CNP         [facility]   IN-PATIENT SERVICE   [facility]    HISTORY AND PHYSICAL EXAMINATION            Date:   3/8/2025  Patient name:  Bandar Zapien  Date of admission:  3/8/2025  2:47 PM  MRN:   [id number]  Account:  [id number]  YOB: 1955  PCP:    No primary care provider on file.  Room:   [address] [place] [address]  Code Status:    Full Code    Chief Complaint:     Chief Complaint   Patient presents with    Vomiting     X 2 days    Abdominal Pain    Diarrhea    Cough     Reports blood    Rectal Bleeding       History Obtained From:     patient, electronic medical record    History of Present Illness:     Bandar Zapien is a 69 y.o. Non- / non  male who presents with Vomiting (X 2 days), Abdominal Pain, Diarrhea, Cough (Reports blood), and Rectal Bleeding   and is admitted to the hospital for the management of SBO (small bowel obstruction) (HCC).    The patient

## 2025-03-10 ENCOUNTER — ANESTHESIA (OUTPATIENT)
Dept: OPERATING ROOM | Age: 70
DRG: 660 | End: 2025-03-10
Payer: MEDICARE

## 2025-03-10 ENCOUNTER — ANESTHESIA EVENT (OUTPATIENT)
Dept: OPERATING ROOM | Age: 70
DRG: 660 | End: 2025-03-10
Payer: MEDICARE

## 2025-03-10 ENCOUNTER — APPOINTMENT (OUTPATIENT)
Dept: GENERAL RADIOLOGY | Age: 70
DRG: 660 | End: 2025-03-10
Payer: MEDICARE

## 2025-03-10 LAB
ANION GAP SERPL CALCULATED.3IONS-SCNC: 10 MMOL/L (ref 9–16)
BACTERIA URNS QL MICRO: ABNORMAL
BASOPHILS # BLD: 0.03 K/UL (ref 0–0.2)
BASOPHILS NFR BLD: 0 % (ref 0–2)
BILIRUB UR QL STRIP: NEGATIVE
BUN SERPL-MCNC: 18 MG/DL (ref 8–23)
CALCIUM SERPL-MCNC: 8.3 MG/DL (ref 8.8–10.2)
CHLORIDE SERPL-SCNC: 111 MMOL/L (ref 98–107)
CLARITY UR: ABNORMAL
CO2 SERPL-SCNC: 22 MMOL/L (ref 20–31)
COLOR UR: YELLOW
CREAT SERPL-MCNC: 1 MG/DL (ref 0.7–1.2)
EOSINOPHIL # BLD: 0.44 K/UL (ref 0–0.44)
EOSINOPHILS RELATIVE PERCENT: 5 % (ref 1–4)
EPI CELLS #/AREA URNS HPF: ABNORMAL /HPF (ref 0–5)
ERYTHROCYTE [DISTWIDTH] IN BLOOD BY AUTOMATED COUNT: 12.7 % (ref 11.8–14.4)
GFR, ESTIMATED: 80 ML/MIN/1.73M2
GLUCOSE BLD-MCNC: 101 MG/DL (ref 75–110)
GLUCOSE BLD-MCNC: 106 MG/DL (ref 75–110)
GLUCOSE BLD-MCNC: 121 MG/DL (ref 75–110)
GLUCOSE BLD-MCNC: 138 MG/DL (ref 75–110)
GLUCOSE SERPL-MCNC: 118 MG/DL (ref 82–115)
GLUCOSE UR STRIP-MCNC: NEGATIVE MG/DL
HCT VFR BLD AUTO: 46.2 % (ref 40.7–50.3)
HGB BLD-MCNC: 15.3 G/DL (ref 13–17)
HGB UR QL STRIP.AUTO: ABNORMAL
IMM GRANULOCYTES # BLD AUTO: 0.03 K/UL (ref 0–0.3)
IMM GRANULOCYTES NFR BLD: 0 %
KETONES UR STRIP-MCNC: ABNORMAL MG/DL
LEUKOCYTE ESTERASE UR QL STRIP: ABNORMAL
LYMPHOCYTES NFR BLD: 1.38 K/UL (ref 1.1–3.7)
LYMPHOCYTES RELATIVE PERCENT: 16 % (ref 24–43)
MCH RBC QN AUTO: 30.2 PG (ref 25.2–33.5)
MCHC RBC AUTO-ENTMCNC: 33.1 G/DL (ref 28.4–34.8)
MCV RBC AUTO: 91.3 FL (ref 82.6–102.9)
MONOCYTES NFR BLD: 0.64 K/UL (ref 0.1–1.2)
MONOCYTES NFR BLD: 8 % (ref 3–12)
NEUTROPHILS NFR BLD: 71 % (ref 36–65)
NEUTS SEG NFR BLD: 5.99 K/UL (ref 1.5–8.1)
NITRITE UR QL STRIP: NEGATIVE
NRBC BLD-RTO: 0 PER 100 WBC
PH UR STRIP: 5.5 [PH] (ref 5–8)
PLATELET # BLD AUTO: 168 K/UL (ref 138–453)
PMV BLD AUTO: 10.6 FL (ref 8.1–13.5)
POTASSIUM SERPL-SCNC: 3.7 MMOL/L (ref 3.7–5.3)
PROT UR STRIP-MCNC: NEGATIVE MG/DL
RBC # BLD AUTO: 5.06 M/UL (ref 4.21–5.77)
RBC #/AREA URNS HPF: ABNORMAL /HPF (ref 0–2)
SODIUM SERPL-SCNC: 142 MMOL/L (ref 136–145)
SP GR UR STRIP: 1.02 (ref 1–1.03)
UROBILINOGEN UR STRIP-ACNC: NORMAL EU/DL (ref 0–1)
WBC #/AREA URNS HPF: ABNORMAL /HPF (ref 0–5)
WBC OTHER # BLD: 8.5 K/UL (ref 3.5–11.3)

## 2025-03-10 PROCEDURE — 6360000002 HC RX W HCPCS: Performed by: NURSE PRACTITIONER

## 2025-03-10 PROCEDURE — 81001 URINALYSIS AUTO W/SCOPE: CPT

## 2025-03-10 PROCEDURE — 51701 INSERT BLADDER CATHETER: CPT

## 2025-03-10 PROCEDURE — 82947 ASSAY GLUCOSE BLOOD QUANT: CPT

## 2025-03-10 PROCEDURE — 7100000001 HC PACU RECOVERY - ADDTL 15 MIN: Performed by: UROLOGY

## 2025-03-10 PROCEDURE — C1758 CATHETER, URETERAL: HCPCS | Performed by: UROLOGY

## 2025-03-10 PROCEDURE — 2580000003 HC RX 258: Performed by: UROLOGY

## 2025-03-10 PROCEDURE — 80048 BASIC METABOLIC PNL TOTAL CA: CPT

## 2025-03-10 PROCEDURE — 2580000003 HC RX 258: Performed by: NURSE PRACTITIONER

## 2025-03-10 PROCEDURE — 6370000000 HC RX 637 (ALT 250 FOR IP): Performed by: UROLOGY

## 2025-03-10 PROCEDURE — C1769 GUIDE WIRE: HCPCS | Performed by: UROLOGY

## 2025-03-10 PROCEDURE — 51798 US URINE CAPACITY MEASURE: CPT

## 2025-03-10 PROCEDURE — 2500000003 HC RX 250 WO HCPCS: Performed by: UROLOGY

## 2025-03-10 PROCEDURE — 7100000000 HC PACU RECOVERY - FIRST 15 MIN: Performed by: UROLOGY

## 2025-03-10 PROCEDURE — 3600000002 HC SURGERY LEVEL 2 BASE: Performed by: UROLOGY

## 2025-03-10 PROCEDURE — 0T9B80Z DRAINAGE OF BLADDER WITH DRAINAGE DEVICE, VIA NATURAL OR ARTIFICIAL OPENING ENDOSCOPIC: ICD-10-PCS | Performed by: INTERNAL MEDICINE

## 2025-03-10 PROCEDURE — 2500000003 HC RX 250 WO HCPCS: Performed by: NURSE PRACTITIONER

## 2025-03-10 PROCEDURE — 2709999900 HC NON-CHARGEABLE SUPPLY: Performed by: UROLOGY

## 2025-03-10 PROCEDURE — 6370000000 HC RX 637 (ALT 250 FOR IP): Performed by: NURSE PRACTITIONER

## 2025-03-10 PROCEDURE — 6360000002 HC RX W HCPCS: Performed by: ANESTHESIOLOGY

## 2025-03-10 PROCEDURE — 99232 SBSQ HOSP IP/OBS MODERATE 35: CPT | Performed by: INTERNAL MEDICINE

## 2025-03-10 PROCEDURE — 85025 COMPLETE CBC W/AUTO DIFF WBC: CPT

## 2025-03-10 PROCEDURE — 3700000001 HC ADD 15 MINUTES (ANESTHESIA): Performed by: UROLOGY

## 2025-03-10 PROCEDURE — 2060000000 HC ICU INTERMEDIATE R&B

## 2025-03-10 PROCEDURE — 3600000012 HC SURGERY LEVEL 2 ADDTL 15MIN: Performed by: UROLOGY

## 2025-03-10 PROCEDURE — 6360000002 HC RX W HCPCS: Performed by: UROLOGY

## 2025-03-10 PROCEDURE — 3700000000 HC ANESTHESIA ATTENDED CARE: Performed by: UROLOGY

## 2025-03-10 PROCEDURE — 36415 COLL VENOUS BLD VENIPUNCTURE: CPT

## 2025-03-10 RX ORDER — NALOXONE HYDROCHLORIDE 0.4 MG/ML
INJECTION, SOLUTION INTRAMUSCULAR; INTRAVENOUS; SUBCUTANEOUS PRN
Status: DISCONTINUED | OUTPATIENT
Start: 2025-03-10 | End: 2025-03-10 | Stop reason: HOSPADM

## 2025-03-10 RX ORDER — LIDOCAINE HYDROCHLORIDE 20 MG/ML
JELLY TOPICAL PRN
Status: DISCONTINUED | OUTPATIENT
Start: 2025-03-10 | End: 2025-03-10 | Stop reason: ALTCHOICE

## 2025-03-10 RX ORDER — SODIUM CHLORIDE 9 MG/ML
INJECTION, SOLUTION INTRAVENOUS PRN
Status: DISCONTINUED | OUTPATIENT
Start: 2025-03-10 | End: 2025-03-10 | Stop reason: HOSPADM

## 2025-03-10 RX ORDER — LIDOCAINE HYDROCHLORIDE 20 MG/ML
INJECTION, SOLUTION EPIDURAL; INFILTRATION; INTRACAUDAL; PERINEURAL
Status: DISCONTINUED | OUTPATIENT
Start: 2025-03-10 | End: 2025-03-10 | Stop reason: SDUPTHER

## 2025-03-10 RX ORDER — SODIUM CHLORIDE 0.9 % (FLUSH) 0.9 %
5-40 SYRINGE (ML) INJECTION EVERY 12 HOURS SCHEDULED
Status: DISCONTINUED | OUTPATIENT
Start: 2025-03-10 | End: 2025-03-10 | Stop reason: HOSPADM

## 2025-03-10 RX ORDER — PROPOFOL 10 MG/ML
INJECTION, EMULSION INTRAVENOUS
Status: DISCONTINUED | OUTPATIENT
Start: 2025-03-10 | End: 2025-03-10 | Stop reason: SDUPTHER

## 2025-03-10 RX ORDER — AMLODIPINE BESYLATE 5 MG/1
5 TABLET ORAL DAILY
Status: DISCONTINUED | OUTPATIENT
Start: 2025-03-10 | End: 2025-03-12

## 2025-03-10 RX ORDER — PANTOPRAZOLE SODIUM 40 MG/1
40 TABLET, DELAYED RELEASE ORAL
Status: DISCONTINUED | OUTPATIENT
Start: 2025-03-11 | End: 2025-03-13 | Stop reason: HOSPADM

## 2025-03-10 RX ORDER — SODIUM CHLORIDE 0.9 % (FLUSH) 0.9 %
5-40 SYRINGE (ML) INJECTION PRN
Status: DISCONTINUED | OUTPATIENT
Start: 2025-03-10 | End: 2025-03-10 | Stop reason: HOSPADM

## 2025-03-10 RX ORDER — KETOROLAC TROMETHAMINE 15 MG/ML
15 INJECTION, SOLUTION INTRAMUSCULAR; INTRAVENOUS ONCE
Status: COMPLETED | OUTPATIENT
Start: 2025-03-10 | End: 2025-03-10

## 2025-03-10 RX ORDER — MAGNESIUM HYDROXIDE 1200 MG/15ML
LIQUID ORAL CONTINUOUS PRN
Status: COMPLETED | OUTPATIENT
Start: 2025-03-10 | End: 2025-03-10

## 2025-03-10 RX ADMIN — SODIUM CHLORIDE: 0.9 INJECTION, SOLUTION INTRAVENOUS at 20:50

## 2025-03-10 RX ADMIN — PROPOFOL 100 MG: 10 INJECTION, EMULSION INTRAVENOUS at 19:21

## 2025-03-10 RX ADMIN — METOPROLOL TARTRATE 5 MG: 5 INJECTION INTRAVENOUS at 08:18

## 2025-03-10 RX ADMIN — METOPROLOL TARTRATE 5 MG: 5 INJECTION INTRAVENOUS at 05:02

## 2025-03-10 RX ADMIN — SODIUM CHLORIDE 40 MG: 9 INJECTION, SOLUTION INTRAMUSCULAR; INTRAVENOUS; SUBCUTANEOUS at 07:42

## 2025-03-10 RX ADMIN — TAMSULOSIN HYDROCHLORIDE 0.4 MG: 0.4 CAPSULE ORAL at 07:42

## 2025-03-10 RX ADMIN — PROPOFOL 50 MG: 10 INJECTION, EMULSION INTRAVENOUS at 19:25

## 2025-03-10 RX ADMIN — LIDOCAINE HYDROCHLORIDE 50 MG: 20 INJECTION, SOLUTION EPIDURAL; INFILTRATION; INTRACAUDAL; PERINEURAL at 19:21

## 2025-03-10 RX ADMIN — SODIUM CHLORIDE: 0.9 INJECTION, SOLUTION INTRAVENOUS at 00:32

## 2025-03-10 RX ADMIN — KETOROLAC TROMETHAMINE 15 MG: 15 INJECTION, SOLUTION INTRAMUSCULAR; INTRAVENOUS at 22:06

## 2025-03-10 RX ADMIN — METOPROLOL TARTRATE 5 MG: 5 INJECTION INTRAVENOUS at 15:43

## 2025-03-10 RX ADMIN — WATER 2000 MG: 1 INJECTION INTRAMUSCULAR; INTRAVENOUS; SUBCUTANEOUS at 15:40

## 2025-03-10 RX ADMIN — PROPOFOL 75 MCG/KG/MIN: 10 INJECTION, EMULSION INTRAVENOUS at 19:22

## 2025-03-10 RX ADMIN — METOPROLOL TARTRATE 5 MG: 5 INJECTION INTRAVENOUS at 20:51

## 2025-03-10 RX ADMIN — PROPOFOL 50 MG: 10 INJECTION, EMULSION INTRAVENOUS at 19:34

## 2025-03-10 ASSESSMENT — PAIN SCALES - GENERAL
PAINLEVEL_OUTOF10: 0
PAINLEVEL_OUTOF10: 8

## 2025-03-10 ASSESSMENT — PAIN DESCRIPTION - LOCATION: LOCATION: PENIS

## 2025-03-10 ASSESSMENT — PAIN - FUNCTIONAL ASSESSMENT: PAIN_FUNCTIONAL_ASSESSMENT: ACTIVITIES ARE NOT PREVENTED

## 2025-03-10 ASSESSMENT — PAIN DESCRIPTION - ORIENTATION: ORIENTATION: MID

## 2025-03-10 ASSESSMENT — PAIN DESCRIPTION - DESCRIPTORS: DESCRIPTORS: SPASM

## 2025-03-10 NOTE — CONSULTS
Sg Solomon MD  Urology Consultation    Patient:  Bandar Zapien  MRN: [id number]  YOB: 1955    CHIEF COMPLAINT:  right flank pain with nausea vomiting abdominal distention    HISTORY OF PRESENT ILLNESS:   The patient is a 69 y.o. male who presents with symptoms as mentioned above, patient has a history of hypertension as well as irritable bowel constipation CT imaging showed an obstructing calculus in the proximal right ureter hydronephrosis.    The patient has developed ileus associated with the obstruction was raised about small bowel obstruction    Patient's old records, notes and chart reviewed and summarized above.    Past Medical History:    Past Medical History:   Diagnosis Date    Hyperlipidemia     Hypertension     PSA elevation        Past Surgical History:    Past Surgical History:   Procedure Laterality Date    PROSTATE BIOPSY      PROSTATE BIOPSY N/A 6/2/2021    PROSTATE BIOPSY WITH ULTRASOUND, OFFICE NOTIFYING ULTRASOUND performed by Trung Boles MD at [facility] OR     Previous  surgery: See history of present illness see record in epic  Medications:    Scheduled Meds:   amLODIPine  5 mg Oral Daily    pantoprazole (PROTONIX) 40 mg in sodium chloride (PF) 0.9 % 10 mL injection  40 mg IntraVENous Daily    tamsulosin  0.4 mg Oral Daily    insulin lispro  0-4 Units SubCUTAneous 4 times per day    metoprolol  5 mg IntraVENous Q6H    cefTRIAXone (ROCEPHIN) IV  2,000 mg IntraVENous Q24H     Continuous Infusions:   dextrose      sodium chloride 100 mL/hr at 03/10/25 0544     PRN Meds:.hydrALAZINE, glucose, dextrose bolus **OR** dextrose bolus, glucagon (rDNA), dextrose, morphine **OR** morphine, ondansetron **OR** ondansetron, polyethylene glycol, acetaminophen **OR** acetaminophen    Allergies:  Latex, Amitiza [lubiprostone], Armoracia rusticana ext (horseradish), Coconut (cocos nucifera), Esomeprazole magnesium, and Pcn [penicillins]    Social History:    Social History     Socioeconomic

## 2025-03-10 NOTE — PLAN OF CARE
Patient alert and oriented; tangential speaking present. VSS. NSR on tele. SpO2 mid 90s on RA. Afebrile. No c/o pain. Pt receiving IV abx and IVF. Urinary retention present; straight cath x1. Sugars controlled. NPO until uro eval d/t right ureteral stone. Will need to tolerate diet advancement prior to d/c. Probable d/c home once medically stable. Bed/chair alarm on, call light within reach. Care ongoing.      Problem: Discharge Planning  Goal: Discharge to home or other facility with appropriate resources  Outcome: Progressing     Problem: Pain  Goal: Verbalizes/displays adequate comfort level or baseline comfort level  Outcome: Progressing     Problem: Skin/Tissue Integrity  Goal: Skin integrity remains intact  Description: 1.  Monitor for areas of redness and/or skin breakdown  2.  Assess vascular access sites hourly  3.  Every 4-6 hours minimum:  Change oxygen saturation probe site  4.  Every 4-6 hours:  If on nasal continuous positive airway pressure, respiratory therapy assess nares and determine need for appliance change or resting period  Outcome: Progressing  Flowsheets (Taken 3/9/2025 2356)  Skin Integrity Remains Intact: Monitor for areas of redness and/or skin breakdown     Problem: Safety - Adult  Goal: Free from fall injury  Outcome: Progressing     Problem: Musculoskeletal - Adult  Goal: Return mobility to safest level of function  Outcome: Progressing  Goal: Return ADL status to a safe level of function  Outcome: Progressing     Problem: Gastrointestinal - Adult  Goal: Minimal or absence of nausea and vomiting  Outcome: Progressing  Goal: Maintains or returns to baseline bowel function  Outcome: Progressing     Problem: Infection - Adult  Goal: Absence of infection at discharge  Outcome: Progressing

## 2025-03-10 NOTE — ANESTHESIA POSTPROCEDURE EVALUATION
Department of Anesthesiology  Postprocedure Note    Patient: Banadr Zapien  MRN: [id number]  YOB: 1955  Date of evaluation: 3/10/2025    Procedure Summary       Date: 03/10/25 Room / Location: [facility]    Anesthesia Start: 1915 Anesthesia Stop: 1954    Procedure: CYSTOSCOPY ,YEUNG PLACEMENT, PYLOGRAM BILATERAL Diagnosis:       Kidney stone      (Kidney stone [N20.0])    Surgeons: Sg Solomon MD Responsible Provider: Deo Chang DO    Anesthesia Type: general, TIVA ASA Status: 3            Anesthesia Type: No value filed.    Evy Phase I:      Evy Phase II:      Anesthesia Post Evaluation    Patient location during evaluation: PACU  Patient participation: complete - patient participated  Level of consciousness: awake and alert  Airway patency: patent  Nausea & Vomiting: no nausea and no vomiting  Cardiovascular status: hemodynamically stable  Respiratory status: acceptable  Hydration status: stable  Pain management: adequate    No notable events documented.

## 2025-03-10 NOTE — CARE COORDINATION
Discharge planning    Patient from home alone but significant other and son comes to see him. 1 story home with no steps.     Admit for SBO and general surgery states that patient is not obstructed and can move to CL.  GI following. KUB today. Will need scopes as outpatient.

## 2025-03-10 NOTE — PLAN OF CARE
Pt oriented x 4. Stand by assist to commode. No complaints of pain. IV fluids continued and antibiotics.     Problem: Discharge Planning  Goal: Discharge to home or other facility with appropriate resources  3/10/2025 1241 by Bri Nettles RN  Outcome: Progressing     Problem: Skin/Tissue Integrity  Goal: Skin integrity remains intact  Description: 1.  Monitor for areas of redness and/or skin breakdown  3/10/2025 1241 by Bri Nettles RN  Outcome: Progressing  Flowsheets  Taken 3/10/2025 0740  Skin Integrity Remains Intact: Monitor for areas of redness and/or skin breakdown  Taken 3/10/2025 0739  Skin Integrity Remains Intact: Monitor for areas of redness and/or skin breakdown     Problem: Safety - Adult  Goal: Free from fall injury  3/10/2025 1241 by Bri Nettles RN  Outcome: Progressing     Problem: Musculoskeletal - Adult  Goal: Return mobility to safest level of function  3/10/2025 1241 by Bri Nettles RN  Outcome: Progressing     Problem: Musculoskeletal - Adult  Goal: Return ADL status to a safe level of function  3/10/2025 1241 by Bri Nettles RN  Outcome: Progressing     Problem: Gastrointestinal - Adult  Goal: Minimal or absence of nausea and vomiting  3/10/2025 1241 by Bri Nettles RN  Outcome: Progressing  Flowsheets (Taken 3/10/2025 0740)  Minimal or absence of nausea and vomiting: Administer IV fluids as ordered to ensure adequate hydration     Problem: Gastrointestinal - Adult  Goal: Maintains or returns to baseline bowel function  3/10/2025 1241 by Bri Nettles RN  Outcome: Progressing  Flowsheets (Taken 3/10/2025 0740)  Maintains or returns to baseline bowel function: Assess bowel function     Problem: Infection - Adult  Goal: Absence of infection at discharge  3/10/2025 1241 by Bri Nettles RN  Outcome: Progressing

## 2025-03-10 NOTE — ANESTHESIA PRE PROCEDURE
Department of Anesthesiology  Preprocedure Note       Name:  Bandar Zapien   Age:  69 y.o.  :  1955                                          MRN:  [id number]         Date:  3/10/2025      Surgeon: Surgeon(s):  Sg Solomon MD    Procedure: Procedure(s):  CYSTOSCOPY URETERAL STENT INSERTION    Medications prior to admission:   Prior to Admission medications    Medication Sig Start Date End Date Taking? Authorizing Provider   amLODIPine (NORVASC) 5 MG tablet TAKE 1 TABLET BY MOUTH DAILY 24  Yes Maria Dolores Em MD   ketoconazole (NIZORAL) 2 % shampoo Apply 3-4 times weekly to scalp, leave on for five minutes prior to washing off  Patient not taking: Reported on 3/8/2025 3/4/25   Dariel Walton PA-C   clobetasol (TEMOVATE) 0.05 % external solution Apply to affected regions of scalp twice daily, as needed, for itching.  Do NOT use on face, groin, or armpits.  Patient not taking: Reported on 3/8/2025 3/4/25   Dariel Walton PA-C   ketoconazole (NIZORAL) 2 % cream Apply to affected regions of face twice daily, as needed, for scaling/itching.  Combine with triamcinolone 0.025% cream, 1:1, when using.  Patient not taking: Reported on 3/8/2025 3/4/25   Dariel Walton PA-C   triamcinolone (KENALOG) 0.025 % cream Apply to affected regions of face twice daily, as needed, for scaling/itching.  Combine with ketoconazole cream, 1:1, when using.  Patient not taking: Reported on 3/8/2025 3/4/25   Dariel Walton PA-C   ezetimibe (ZETIA) 10 MG tablet Take 1 tablet by mouth daily  Patient not taking: Reported on 3/8/2025 7/31/24   Maria Dolores Em MD   pantoprazole (PROTONIX) 40 MG tablet Take 1 tablet by mouth daily  Patient not taking: Reported on 3/8/2025 6/7/23   Provider, MD Angelica   lubiprostone (AMITIZA) 8 MCG CAPS capsule Take 1 capsule by mouth daily  Patient not taking: Reported on 2022  Maria Dolores Em MD       Current medications:    Current Facility-Administered Medications

## 2025-03-10 NOTE — OP NOTE
Operative Note      Patient: Bandar Zapien  YOB: 1955  MRN: [id number]    Date of Procedure: 3/10/2025    Pre-Op Diagnosis Codes:      * Kidney stone [N20.0]    Post-Op Diagnosis: Large bladder stone     Markedly enlarged prostate obstructing ureteral orifices  Renal lithiasis  Procedure(s):  CYSTOSCOPY URETERAL STENT INSERTION    Surgeon(s):  Sg Solomon MD    Assistant:   * No surgical staff found *    Anesthesia: Monitor Anesthesia Care    Estimated Blood Loss (mL): Minimal    Complications: None    Specimens:   * No specimens in log *    Implants:  * No implants in log *      Drains:   [REMOVED] NG/OG/NJ/NE Tube Nasogastric 16 fr Right nostril (Removed)   Surrounding Skin Clean, dry & intact 03/09/25 0700   Securement device Adhesive based alex 03/09/25 0700   Status Suction-low continuous 03/09/25 0700   Placement Verified Gastric Contents 03/09/25 0700   NG/OG/NJ/NE External Measurement (cm) 60 cm 03/09/25 0700   Drainage Appearance Bile;Green 03/09/25 0700   Output (mL) 175 ml 03/09/25 1443       Findings:  Infection Present At Time Of Surgery (PATOS) (choose all levels that have infection present):  No infection present  Other Findings: Indications:   69-year-old male he follows up usually with Dr. Boles for urology he presented to the emergency room with with right ureteral calculus, renal calculi, large bladder stone, scheduled for cystoscopy and placement of stent in the right ureter  Detailed Description of Procedure:   Patient was brought to the operating room, positioned in dorsolithotomy, proper patient identification procedure identification prepping and draping.    We entered the bladder with the cystoscope, we identified a stricture in the posterior urethra and the markedly enlarged prostate, with a large median lobe protruding into the bladder.    The ureteral orifices are covered by the protruding median lobe as well as a large stone sitting over the ureteral

## 2025-03-11 LAB
GLUCOSE BLD-MCNC: 101 MG/DL (ref 75–110)
GLUCOSE BLD-MCNC: 111 MG/DL (ref 75–110)
GLUCOSE BLD-MCNC: 114 MG/DL (ref 75–110)
GLUCOSE BLD-MCNC: 116 MG/DL (ref 75–110)
GLUCOSE BLD-MCNC: 118 MG/DL (ref 75–110)
HCT VFR BLD AUTO: 44.7 % (ref 40.7–50.3)
HGB BLD-MCNC: 15.1 G/DL (ref 13–17)
TROPONIN I SERPL HS-MCNC: 21 NG/L (ref 0–22)
TROPONIN I SERPL HS-MCNC: 9 NG/L (ref 0–22)

## 2025-03-11 PROCEDURE — 93005 ELECTROCARDIOGRAM TRACING: CPT

## 2025-03-11 PROCEDURE — 6370000000 HC RX 637 (ALT 250 FOR IP): Performed by: INTERNAL MEDICINE

## 2025-03-11 PROCEDURE — 6360000002 HC RX W HCPCS: Performed by: UROLOGY

## 2025-03-11 PROCEDURE — 2060000000 HC ICU INTERMEDIATE R&B

## 2025-03-11 PROCEDURE — 2580000003 HC RX 258: Performed by: INTERNAL MEDICINE

## 2025-03-11 PROCEDURE — 36415 COLL VENOUS BLD VENIPUNCTURE: CPT

## 2025-03-11 PROCEDURE — 99232 SBSQ HOSP IP/OBS MODERATE 35: CPT | Performed by: INTERNAL MEDICINE

## 2025-03-11 PROCEDURE — 85014 HEMATOCRIT: CPT

## 2025-03-11 PROCEDURE — 85018 HEMOGLOBIN: CPT

## 2025-03-11 PROCEDURE — 82947 ASSAY GLUCOSE BLOOD QUANT: CPT

## 2025-03-11 PROCEDURE — 6370000000 HC RX 637 (ALT 250 FOR IP): Performed by: UROLOGY

## 2025-03-11 PROCEDURE — 84484 ASSAY OF TROPONIN QUANT: CPT

## 2025-03-11 PROCEDURE — 6360000002 HC RX W HCPCS

## 2025-03-11 PROCEDURE — 2580000003 HC RX 258: Performed by: UROLOGY

## 2025-03-11 PROCEDURE — 2500000003 HC RX 250 WO HCPCS: Performed by: UROLOGY

## 2025-03-11 RX ORDER — LABETALOL HYDROCHLORIDE 5 MG/ML
10 INJECTION, SOLUTION INTRAVENOUS ONCE
Status: COMPLETED | OUTPATIENT
Start: 2025-03-11 | End: 2025-03-11

## 2025-03-11 RX ORDER — DEXTROSE MONOHYDRATE AND SODIUM CHLORIDE 5; .9 G/100ML; G/100ML
INJECTION, SOLUTION INTRAVENOUS CONTINUOUS
Status: DISCONTINUED | OUTPATIENT
Start: 2025-03-11 | End: 2025-03-13 | Stop reason: HOSPADM

## 2025-03-11 RX ORDER — METOPROLOL TARTRATE 25 MG/1
25 TABLET, FILM COATED ORAL 2 TIMES DAILY
Status: DISCONTINUED | OUTPATIENT
Start: 2025-03-11 | End: 2025-03-13 | Stop reason: HOSPADM

## 2025-03-11 RX ADMIN — AMLODIPINE BESYLATE 5 MG: 5 TABLET ORAL at 08:37

## 2025-03-11 RX ADMIN — SODIUM CHLORIDE: 0.9 INJECTION, SOLUTION INTRAVENOUS at 06:03

## 2025-03-11 RX ADMIN — METOPROLOL TARTRATE 5 MG: 5 INJECTION INTRAVENOUS at 08:37

## 2025-03-11 RX ADMIN — PANTOPRAZOLE SODIUM 40 MG: 40 TABLET, DELAYED RELEASE ORAL at 06:01

## 2025-03-11 RX ADMIN — LABETALOL HYDROCHLORIDE 10 MG: 5 INJECTION INTRAVENOUS at 12:52

## 2025-03-11 RX ADMIN — DEXTROSE AND SODIUM CHLORIDE: 5; .9 INJECTION, SOLUTION INTRAVENOUS at 22:36

## 2025-03-11 RX ADMIN — HYDRALAZINE HYDROCHLORIDE 10 MG: 20 INJECTION INTRAMUSCULAR; INTRAVENOUS at 12:02

## 2025-03-11 RX ADMIN — TAMSULOSIN HYDROCHLORIDE 0.4 MG: 0.4 CAPSULE ORAL at 08:37

## 2025-03-11 RX ADMIN — DEXTROSE AND SODIUM CHLORIDE: 5; .9 INJECTION, SOLUTION INTRAVENOUS at 12:14

## 2025-03-11 RX ADMIN — WATER 2000 MG: 1 INJECTION INTRAMUSCULAR; INTRAVENOUS; SUBCUTANEOUS at 14:00

## 2025-03-11 RX ADMIN — METOPROLOL TARTRATE 25 MG: 25 TABLET, FILM COATED ORAL at 19:56

## 2025-03-11 RX ADMIN — METOPROLOL TARTRATE 5 MG: 5 INJECTION INTRAVENOUS at 04:49

## 2025-03-11 NOTE — FLOWSHEET NOTE
03/11/25 1145   Vital Signs   Temp 98.1 °F (36.7 °C)   Temp Source Oral   Pulse 77   Heart Rate Source Monitor   Respirations 18   BP (!) 175/84   MAP (Calculated) 114   BP Location Left upper arm     Pt called out and stated to feel cold sweats and dizzy. . Dr Pink notified. Pt still having blood passing when urinating after bellamy removed. Orders placed for Hemoglobin check and PRN Hydralazine given IV.

## 2025-03-11 NOTE — PLAN OF CARE
Pt oriented x 4. Up with stand by assist to commode.  De La Cruz removed today. Plan for Lithotripsy tomorrow per Dr Solomon.  IV fluids/antibiotics continued.  Full liquid diet per surgery.    Problem: Discharge Planning  Goal: Discharge to home or other facility with appropriate resources  3/11/2025 1105 by Bri Nettles RN  Outcome: Progressing     Problem: Skin/Tissue Integrity  Goal: Skin integrity remains intact  Description: 1.  Monitor for areas of redness and/or skin breakdown  3/11/2025 1105 by Bri Nettles RN  Outcome: Progressing    Problem: Safety - Adult  Goal: Free from fall injury  3/11/2025 1105 by Bri Nettles RN  Outcome: Progressing     Problem: Musculoskeletal - Adult  Goal: Return mobility to safest level of function  3/11/2025 1105 by Bri Nettles RN  Outcome: Progressing     Problem: Gastrointestinal - Adult  Goal: Minimal or absence of nausea and vomiting  3/11/2025 1105 by Bri Nettles RN  Outcome: Progressing     Problem: Gastrointestinal - Adult  Goal: Maintains or returns to baseline bowel function  3/11/2025 1105 by Bri Nettles RN  Outcome: Progressing     Problem: Infection - Adult  Goal: Absence of infection at discharge  3/11/2025 1105 by Bri Nettles RN  Outcome: Progressing     Problem: Nutrition Deficit:  Goal: Optimize nutritional status  3/11/2025 1105 by Bri Nettles, RN  Outcome: Progressing

## 2025-03-11 NOTE — PLAN OF CARE
Patient A & O x4 and ambulates as a standby to the [facility]  Cysto yesterday, De La Cruz inserted by Dr. Solomon, urine bloody but clear, no clots  De La Cruz leaking around site at times, bladder spasms intermittently, 1x dose IV Toradol per Dr. Solomon. Patient pain now relieved  BP elevated, but stable  Care ongoing      Problem: Discharge Planning  Goal: Discharge to home or other facility with appropriate resources  3/11/2025 0033 by Dakota Miranda RN  Outcome: Progressing     Problem: Pain  Goal: Verbalizes/displays adequate comfort level or baseline comfort level  3/11/2025 0033 by Dakota Miranda RN  Outcome: Progressing     Problem: Skin/Tissue Integrity  Goal: Skin integrity remains intact  Description: 1.  Monitor for areas of redness and/or skin breakdown  3/11/2025 0033 by Dakota Miranda RN  Outcome: Progressing  Flowsheets (Taken 3/11/2025 0033)  Skin Integrity Remains Intact: Monitor for areas of redness and/or skin breakdown     Problem: Safety - Adult  Goal: Free from fall injury  3/11/2025 0033 by Dakota Miranda RN  Outcome: Progressing     Problem: Musculoskeletal - Adult  Goal: Return mobility to safest level of function  3/11/2025 0033 by Dakota Miranda RN  Outcome: Progressing     Problem: Musculoskeletal - Adult  Goal: Return ADL status to a safe level of function  3/11/2025 0033 by Dakota Miranda RN  Outcome: Progressing     Problem: Gastrointestinal - Adult  Goal: Minimal or absence of nausea and vomiting  3/11/2025 0033 by Dakota Miranda RN  Outcome: Progressing     Problem: Gastrointestinal - Adult  Goal: Maintains or returns to baseline bowel function  3/11/2025 0033 by Dakota Miranda RN  Outcome: Progressing     Problem: Infection - Adult  Goal: Absence of infection at discharge  3/11/2025 0033 by Dakota Miranda RN  Outcome: Progressing     Problem: Nutrition Deficit:  Goal: Optimize nutritional status  Outcome: Progressing

## 2025-03-11 NOTE — CARE COORDINATION
DC Planning    Plan for litho in am. Pt is independent and drives. Declines dc needs. Discussed pcp-considering options.

## 2025-03-12 ENCOUNTER — ANESTHESIA EVENT (OUTPATIENT)
Dept: OPERATING ROOM | Age: 70
DRG: 660 | End: 2025-03-12
Payer: MEDICARE

## 2025-03-12 ENCOUNTER — ANESTHESIA (OUTPATIENT)
Dept: OPERATING ROOM | Age: 70
DRG: 660 | End: 2025-03-12
Payer: MEDICARE

## 2025-03-12 ENCOUNTER — APPOINTMENT (OUTPATIENT)
Dept: GENERAL RADIOLOGY | Age: 70
DRG: 660 | End: 2025-03-12
Payer: MEDICARE

## 2025-03-12 LAB
BILIRUB UR QL STRIP: NEGATIVE
CLARITY UR: ABNORMAL
COLOR UR: ABNORMAL
EPI CELLS #/AREA URNS HPF: NORMAL /HPF (ref 0–5)
GLUCOSE BLD-MCNC: 125 MG/DL (ref 75–110)
GLUCOSE BLD-MCNC: 134 MG/DL (ref 75–110)
GLUCOSE BLD-MCNC: 162 MG/DL (ref 75–110)
GLUCOSE BLD-MCNC: 177 MG/DL (ref 75–110)
GLUCOSE UR STRIP-MCNC: NEGATIVE MG/DL
HGB UR QL STRIP.AUTO: ABNORMAL
KETONES UR STRIP-MCNC: NEGATIVE MG/DL
LEUKOCYTE ESTERASE UR QL STRIP: ABNORMAL
NITRITE UR QL STRIP: NEGATIVE
PH UR STRIP: 6 [PH] (ref 5–8)
PROT UR STRIP-MCNC: ABNORMAL MG/DL
RBC #/AREA URNS HPF: NORMAL /HPF (ref 0–2)
SP GR UR STRIP: 1.01 (ref 1–1.03)
UROBILINOGEN UR STRIP-ACNC: NORMAL EU/DL (ref 0–1)
WBC #/AREA URNS HPF: NORMAL /HPF (ref 0–5)

## 2025-03-12 PROCEDURE — 6370000000 HC RX 637 (ALT 250 FOR IP): Performed by: UROLOGY

## 2025-03-12 PROCEDURE — 2500000003 HC RX 250 WO HCPCS: Performed by: NURSE ANESTHETIST, CERTIFIED REGISTERED

## 2025-03-12 PROCEDURE — C1747 HC ENDOSCOPE, SINGLE, URINARY TRACT: HCPCS | Performed by: UROLOGY

## 2025-03-12 PROCEDURE — 82947 ASSAY GLUCOSE BLOOD QUANT: CPT

## 2025-03-12 PROCEDURE — 3600000012 HC SURGERY LEVEL 2 ADDTL 15MIN: Performed by: UROLOGY

## 2025-03-12 PROCEDURE — 7100000000 HC PACU RECOVERY - FIRST 15 MIN: Performed by: UROLOGY

## 2025-03-12 PROCEDURE — 6370000000 HC RX 637 (ALT 250 FOR IP): Performed by: INTERNAL MEDICINE

## 2025-03-12 PROCEDURE — 6360000002 HC RX W HCPCS: Performed by: UROLOGY

## 2025-03-12 PROCEDURE — 82365 CALCULUS SPECTROSCOPY: CPT

## 2025-03-12 PROCEDURE — 99232 SBSQ HOSP IP/OBS MODERATE 35: CPT | Performed by: INTERNAL MEDICINE

## 2025-03-12 PROCEDURE — 6360000004 HC RX CONTRAST MEDICATION: Performed by: UROLOGY

## 2025-03-12 PROCEDURE — 6360000002 HC RX W HCPCS: Performed by: NURSE ANESTHETIST, CERTIFIED REGISTERED

## 2025-03-12 PROCEDURE — 2580000003 HC RX 258: Performed by: UROLOGY

## 2025-03-12 PROCEDURE — 3600000002 HC SURGERY LEVEL 2 BASE: Performed by: UROLOGY

## 2025-03-12 PROCEDURE — 0T768DZ DILATION OF RIGHT URETER WITH INTRALUMINAL DEVICE, VIA NATURAL OR ARTIFICIAL OPENING ENDOSCOPIC: ICD-10-PCS | Performed by: INTERNAL MEDICINE

## 2025-03-12 PROCEDURE — 3700000001 HC ADD 15 MINUTES (ANESTHESIA): Performed by: UROLOGY

## 2025-03-12 PROCEDURE — C1769 GUIDE WIRE: HCPCS | Performed by: UROLOGY

## 2025-03-12 PROCEDURE — 1200000000 HC SEMI PRIVATE

## 2025-03-12 PROCEDURE — 7100000001 HC PACU RECOVERY - ADDTL 15 MIN: Performed by: UROLOGY

## 2025-03-12 PROCEDURE — 2720000010 HC SURG SUPPLY STERILE: Performed by: UROLOGY

## 2025-03-12 PROCEDURE — 81001 URINALYSIS AUTO W/SCOPE: CPT

## 2025-03-12 PROCEDURE — 3700000000 HC ANESTHESIA ATTENDED CARE: Performed by: UROLOGY

## 2025-03-12 PROCEDURE — C2617 STENT, NON-COR, TEM W/O DEL: HCPCS | Performed by: UROLOGY

## 2025-03-12 PROCEDURE — C1758 CATHETER, URETERAL: HCPCS | Performed by: UROLOGY

## 2025-03-12 PROCEDURE — 0TCB8ZZ EXTIRPATION OF MATTER FROM BLADDER, VIA NATURAL OR ARTIFICIAL OPENING ENDOSCOPIC: ICD-10-PCS | Performed by: INTERNAL MEDICINE

## 2025-03-12 PROCEDURE — 2709999900 HC NON-CHARGEABLE SUPPLY: Performed by: UROLOGY

## 2025-03-12 DEVICE — URETERAL STENT
Type: IMPLANTABLE DEVICE | Site: URETHRA | Status: FUNCTIONAL
Brand: POLARIS™ ULTRA

## 2025-03-12 RX ORDER — DIPHENHYDRAMINE HYDROCHLORIDE 50 MG/ML
12.5 INJECTION INTRAMUSCULAR; INTRAVENOUS
Status: DISCONTINUED | OUTPATIENT
Start: 2025-03-12 | End: 2025-03-12 | Stop reason: HOSPADM

## 2025-03-12 RX ORDER — SUCCINYLCHOLINE/SOD CL,ISO/PF 100 MG/5ML
SYRINGE (ML) INTRAVENOUS
Status: DISCONTINUED | OUTPATIENT
Start: 2025-03-12 | End: 2025-03-12 | Stop reason: SDUPTHER

## 2025-03-12 RX ORDER — EPHEDRINE SULFATE/0.9% NACL/PF 25 MG/5 ML
SYRINGE (ML) INTRAVENOUS
Status: DISCONTINUED | OUTPATIENT
Start: 2025-03-12 | End: 2025-03-12 | Stop reason: SDUPTHER

## 2025-03-12 RX ORDER — AMLODIPINE BESYLATE 10 MG/1
10 TABLET ORAL DAILY
Qty: 30 TABLET | Refills: 3 | Status: SHIPPED | OUTPATIENT
Start: 2025-03-13

## 2025-03-12 RX ORDER — PHENYLEPHRINE HCL IN 0.9% NACL 1 MG/10 ML
SYRINGE (ML) INTRAVENOUS
Status: DISCONTINUED | OUTPATIENT
Start: 2025-03-12 | End: 2025-03-12 | Stop reason: SDUPTHER

## 2025-03-12 RX ORDER — LIDOCAINE HYDROCHLORIDE 20 MG/ML
INJECTION, SOLUTION EPIDURAL; INFILTRATION; INTRACAUDAL; PERINEURAL
Status: DISCONTINUED | OUTPATIENT
Start: 2025-03-12 | End: 2025-03-12 | Stop reason: SDUPTHER

## 2025-03-12 RX ORDER — LIDOCAINE HYDROCHLORIDE 20 MG/ML
JELLY TOPICAL PRN
Status: DISCONTINUED | OUTPATIENT
Start: 2025-03-12 | End: 2025-03-12 | Stop reason: ALTCHOICE

## 2025-03-12 RX ORDER — FENTANYL CITRATE 50 UG/ML
INJECTION, SOLUTION INTRAMUSCULAR; INTRAVENOUS
Status: DISCONTINUED | OUTPATIENT
Start: 2025-03-12 | End: 2025-03-12 | Stop reason: SDUPTHER

## 2025-03-12 RX ORDER — FENTANYL CITRATE 50 UG/ML
25 INJECTION, SOLUTION INTRAMUSCULAR; INTRAVENOUS EVERY 5 MIN PRN
Status: DISCONTINUED | OUTPATIENT
Start: 2025-03-12 | End: 2025-03-12 | Stop reason: HOSPADM

## 2025-03-12 RX ORDER — ONDANSETRON 2 MG/ML
4 INJECTION INTRAMUSCULAR; INTRAVENOUS
Status: DISCONTINUED | OUTPATIENT
Start: 2025-03-12 | End: 2025-03-12 | Stop reason: HOSPADM

## 2025-03-12 RX ORDER — TAMSULOSIN HYDROCHLORIDE 0.4 MG/1
0.4 CAPSULE ORAL DAILY
Qty: 30 CAPSULE | Refills: 3 | Status: SHIPPED | OUTPATIENT
Start: 2025-03-13

## 2025-03-12 RX ORDER — AMLODIPINE BESYLATE 10 MG/1
10 TABLET ORAL DAILY
Status: DISCONTINUED | OUTPATIENT
Start: 2025-03-13 | End: 2025-03-13 | Stop reason: HOSPADM

## 2025-03-12 RX ORDER — MAGNESIUM HYDROXIDE 1200 MG/15ML
1000 LIQUID ORAL ONCE
Status: DISCONTINUED | OUTPATIENT
Start: 2025-03-12 | End: 2025-03-13 | Stop reason: HOSPADM

## 2025-03-12 RX ORDER — PROPOFOL 10 MG/ML
INJECTION, EMULSION INTRAVENOUS
Status: DISCONTINUED | OUTPATIENT
Start: 2025-03-12 | End: 2025-03-12 | Stop reason: SDUPTHER

## 2025-03-12 RX ORDER — ONDANSETRON 2 MG/ML
INJECTION INTRAMUSCULAR; INTRAVENOUS
Status: DISCONTINUED | OUTPATIENT
Start: 2025-03-12 | End: 2025-03-12 | Stop reason: SDUPTHER

## 2025-03-12 RX ORDER — METOPROLOL TARTRATE 25 MG/1
25 TABLET, FILM COATED ORAL 2 TIMES DAILY
Qty: 60 TABLET | Refills: 3 | Status: SHIPPED | OUTPATIENT
Start: 2025-03-12

## 2025-03-12 RX ORDER — AMLODIPINE BESYLATE 5 MG/1
5 TABLET ORAL ONCE
Status: COMPLETED | OUTPATIENT
Start: 2025-03-12 | End: 2025-03-12

## 2025-03-12 RX ORDER — DEXAMETHASONE SODIUM PHOSPHATE 10 MG/ML
INJECTION, SOLUTION INTRAMUSCULAR; INTRAVENOUS
Status: DISCONTINUED | OUTPATIENT
Start: 2025-03-12 | End: 2025-03-12 | Stop reason: SDUPTHER

## 2025-03-12 RX ORDER — MIDAZOLAM HYDROCHLORIDE 1 MG/ML
INJECTION, SOLUTION INTRAMUSCULAR; INTRAVENOUS
Status: DISCONTINUED | OUTPATIENT
Start: 2025-03-12 | End: 2025-03-12 | Stop reason: SDUPTHER

## 2025-03-12 RX ORDER — PROCHLORPERAZINE EDISYLATE 5 MG/ML
5 INJECTION INTRAMUSCULAR; INTRAVENOUS
Status: DISCONTINUED | OUTPATIENT
Start: 2025-03-12 | End: 2025-03-12 | Stop reason: HOSPADM

## 2025-03-12 RX ADMIN — EPHEDRINE SULFATE 5 MG: 5 INJECTION INTRAVENOUS at 12:30

## 2025-03-12 RX ADMIN — METOPROLOL TARTRATE 25 MG: 25 TABLET, FILM COATED ORAL at 19:54

## 2025-03-12 RX ADMIN — ONDANSETRON 4 MG: 2 INJECTION, SOLUTION INTRAMUSCULAR; INTRAVENOUS at 13:44

## 2025-03-12 RX ADMIN — PROPOFOL 50 MG: 10 INJECTION, EMULSION INTRAVENOUS at 12:32

## 2025-03-12 RX ADMIN — EPHEDRINE SULFATE 5 MG: 5 INJECTION INTRAVENOUS at 13:19

## 2025-03-12 RX ADMIN — TAMSULOSIN HYDROCHLORIDE 0.4 MG: 0.4 CAPSULE ORAL at 09:13

## 2025-03-12 RX ADMIN — EPHEDRINE SULFATE 5 MG: 5 INJECTION INTRAVENOUS at 12:40

## 2025-03-12 RX ADMIN — MORPHINE SULFATE 4 MG: 4 INJECTION, SOLUTION INTRAMUSCULAR; INTRAVENOUS at 16:28

## 2025-03-12 RX ADMIN — DEXTROSE AND SODIUM CHLORIDE: 5; .9 INJECTION, SOLUTION INTRAVENOUS at 15:40

## 2025-03-12 RX ADMIN — Medication 200 MCG: at 13:19

## 2025-03-12 RX ADMIN — METOPROLOL TARTRATE 25 MG: 25 TABLET, FILM COATED ORAL at 09:13

## 2025-03-12 RX ADMIN — AMLODIPINE BESYLATE 5 MG: 5 TABLET ORAL at 09:13

## 2025-03-12 RX ADMIN — Medication 100 MCG: at 12:45

## 2025-03-12 RX ADMIN — DEXTROSE AND SODIUM CHLORIDE: 5; .9 INJECTION, SOLUTION INTRAVENOUS at 22:57

## 2025-03-12 RX ADMIN — LIDOCAINE HYDROCHLORIDE 100 MG: 20 INJECTION, SOLUTION EPIDURAL; INFILTRATION; INTRACAUDAL; PERINEURAL at 11:51

## 2025-03-12 RX ADMIN — PROPOFOL 200 MG: 10 INJECTION, EMULSION INTRAVENOUS at 11:51

## 2025-03-12 RX ADMIN — Medication 100 MCG: at 12:11

## 2025-03-12 RX ADMIN — AMLODIPINE BESYLATE 5 MG: 5 TABLET ORAL at 09:54

## 2025-03-12 RX ADMIN — DEXAMETHASONE SODIUM PHOSPHATE 10 MG: 10 INJECTION INTRAMUSCULAR; INTRAVENOUS at 11:57

## 2025-03-12 RX ADMIN — Medication 200 MCG: at 12:19

## 2025-03-12 RX ADMIN — PROPOFOL 50 MG: 10 INJECTION, EMULSION INTRAVENOUS at 12:35

## 2025-03-12 RX ADMIN — Medication 100 MG: at 11:51

## 2025-03-12 RX ADMIN — FENTANYL CITRATE 100 MCG: 50 INJECTION INTRAMUSCULAR; INTRAVENOUS at 11:51

## 2025-03-12 RX ADMIN — ONDANSETRON 4 MG: 2 INJECTION, SOLUTION INTRAMUSCULAR; INTRAVENOUS at 16:29

## 2025-03-12 RX ADMIN — Medication 100 MCG: at 12:10

## 2025-03-12 RX ADMIN — Medication 200 MCG: at 13:25

## 2025-03-12 RX ADMIN — EPHEDRINE SULFATE 10 MG: 5 INJECTION INTRAVENOUS at 12:44

## 2025-03-12 RX ADMIN — MIDAZOLAM 2 MG: 1 INJECTION INTRAMUSCULAR; INTRAVENOUS at 11:49

## 2025-03-12 RX ADMIN — PANTOPRAZOLE SODIUM 40 MG: 40 TABLET, DELAYED RELEASE ORAL at 05:58

## 2025-03-12 RX ADMIN — Medication 200 MCG: at 12:24

## 2025-03-12 RX ADMIN — FENTANYL CITRATE 25 MCG: 50 INJECTION INTRAMUSCULAR; INTRAVENOUS at 13:37

## 2025-03-12 ASSESSMENT — PAIN SCALES - GENERAL
PAINLEVEL_OUTOF10: 2
PAINLEVEL_OUTOF10: 8
PAINLEVEL_OUTOF10: 7
PAINLEVEL_OUTOF10: 2

## 2025-03-12 ASSESSMENT — PAIN - FUNCTIONAL ASSESSMENT: PAIN_FUNCTIONAL_ASSESSMENT: PREVENTS OR INTERFERES SOME ACTIVE ACTIVITIES AND ADLS

## 2025-03-12 ASSESSMENT — PAIN DESCRIPTION - DESCRIPTORS
DESCRIPTORS: DISCOMFORT;TENDER
DESCRIPTORS: ACHING;SHOOTING

## 2025-03-12 ASSESSMENT — PAIN DESCRIPTION - ORIENTATION
ORIENTATION: RIGHT
ORIENTATION: RIGHT

## 2025-03-12 ASSESSMENT — PAIN DESCRIPTION - LOCATION
LOCATION: ABDOMEN
LOCATION: ABDOMEN;FLANK
LOCATION: ABDOMEN;FLANK

## 2025-03-12 NOTE — ANESTHESIA POSTPROCEDURE EVALUATION
Department of Anesthesiology  Postprocedure Note    Patient: Bandar Zapien  MRN: [id number]  YOB: 1955  Date of evaluation: 3/12/2025    Procedure Summary       Date: 03/12/25 Room / Location: [facility]    Anesthesia Start: 1149 Anesthesia Stop: 1403    Procedure: CYSTOSCOPY,   URETEROSCOPY , HOLMIUM LASER LITHOTRIPSY, BLADDER STONE REMOVAL, RETROGRADE PYELOGRAM , RIGHT STENT PLACEMENT Diagnosis:       Kidney stone      (Kidney stone [N20.0])    Surgeons: Sg Solomon MD Responsible Provider: Carito Davis MD    Anesthesia Type: general ASA Status: 2            Anesthesia Type: No value filed.    Evy Phase I: Evy Score: 10    Evy Phase II:      Anesthesia Post Evaluation    Airway patency: patent  Cardiovascular status: hemodynamically stable  Respiratory status: acceptable    No notable events documented.

## 2025-03-12 NOTE — OP NOTE
Operative Note      Patient: Bandar Zapien  YOB: 1955  MRN: [id number]    Date of Procedure: 3/12/2025    Pre-Op Diagnosis Codes:      * Kidney stone [N20.0]  Large bladder calculus  Right proximal ureteral calculus  Post-Op Diagnosis: Same       Procedure(s):  CYSTOSCOPY,   URETEROSCOPY , HOLMIUM LASER LITHOTRIPSY, BLADDER STONE REMOVAL, RETROGRADE PYELOGRAM , RIGHT STENT PLACEMENT    Surgeon(s):  Sg Solomon MD    Assistant:   * No surgical staff found *    Anesthesia: General    Estimated Blood Loss (mL): Minimal    Complications: None    Specimens:   ID Type Source Tests Collected by Time Destination   1 : URINE FOR CULTURE Urine Bladder URINALYSIS WITH REFLEX TO CULTURE Sg Solomon MD 3/12/2025 1209    2 : BLADDER STONES Stone (Calculus) Bladder STONE ANALYSIS Sg Solomon MD 3/12/2025 1233        Implants:  Implant Name Type Inv. Item Serial No.  Lot No. LRB No. Used Action   STENT URET L30CM OD5FR PERCFLX HYDR+ DBL PGTL THRD 2 - [id number]  STENT URET L30CM OD5FR PERCFLX HYDR+ DBL PGTL THRD 2  [facility]OLOG[facility]- [id number] Right 1 Implanted         Drains:   Urinary Catheter 03/12/25 3 Way (Active)       [REMOVED] NG/OG/NJ/NE Tube Nasogastric 16 fr Right nostril (Removed)   Surrounding Skin Clean, dry & intact 03/09/25 0700   Securement device Adhesive based alex 03/09/25 0700   Status Suction-low continuous 03/09/25 0700   Placement Verified Gastric Contents 03/09/25 0700   NG/OG/NJ/NE External Measurement (cm) 60 cm 03/09/25 0700   Drainage Appearance Bile;Green 03/09/25 0700   Output (mL) 175 ml 03/09/25 1443       [REMOVED] Urinary Catheter 03/10/25 2 Way (Removed)   Catheter Indications Urinary retention (acute or chronic), continuous bladder irrigation or bladder outlet obstruction 03/11/25 0600   Site Assessment Bleeding 03/11/25 0600   Urine Color Bloody 03/11/25 0600   Urine Appearance Clear 03/11/25 0600   Collection Container Standard 03/11/25 0600

## 2025-03-12 NOTE — PLAN OF CARE
Patient alert and oriented. VSS. SpO2 high 90s on RA. Afebrile. No c/o pain. Pt receiving IV abx. Plan for lithotripsy today. Sugars controlled. Pink-tinged urine present. Hopeful d/c home today. Bed alarm on, call light within reach. Care ongoing.      Problem: Discharge Planning  Goal: Discharge to home or other facility with appropriate resources  Outcome: Progressing     Problem: Pain  Goal: Verbalizes/displays adequate comfort level or baseline comfort level  Outcome: Progressing     Problem: Skin/Tissue Integrity  Goal: Skin integrity remains intact  Description: 1.  Monitor for areas of redness and/or skin breakdown  2.  Assess vascular access sites hourly  3.  Every 4-6 hours minimum:  Change oxygen saturation probe site  4.  Every 4-6 hours:  If on nasal continuous positive airway pressure, respiratory therapy assess nares and determine need for appliance change or resting period  Outcome: Progressing     Problem: Safety - Adult  Goal: Free from fall injury  Outcome: Progressing     Problem: Musculoskeletal - Adult  Goal: Return mobility to safest level of function  Outcome: Progressing  Goal: Return ADL status to a safe level of function  Outcome: Progressing     Problem: Gastrointestinal - Adult  Goal: Minimal or absence of nausea and vomiting  Outcome: Progressing  Goal: Maintains or returns to baseline bowel function  Outcome: Progressing     Problem: Infection - Adult  Goal: Absence of infection at discharge  Outcome: Progressing     Problem: Nutrition Deficit:  Goal: Optimize nutritional status  Outcome: Progressing      Imaging Studies/Labs/EKG

## 2025-03-12 NOTE — DISCHARGE SUMMARY
[facility]  Office: [phone]  Parker Glover DO, Sriram Barber DO, Michael Pink DO, Phillip Ricardo DO, Hailey Fan MD, Sherrill Pablo MD, Kaykay Hobbs MD, Radha Kraft MD,  Ben Trejo MD, Rebecca Bucio MD, Leatha Dominique MD,  Bruce Douglas DO, Tresa Oneal MD, Ian Hanson MD, Bandar Glover DO, Misti Pedraza MD,  Delbert Melissa DO, Molly Chavez MD, Rachel Aguilera MD, Haley Coe MD, Edi Parker MD,  Daniel Kang MD, Tony Ferguson MD, Kristy Blackwell MD, Marixa Luo MD, Tariq Malik MD, Prudence Urbano MD, Slick Little DO, Ten Lopez MD, Bruce Peters MD, Mohsin Reza, MD, Shirley Waterhouse, CNP,  Vanesa Pelayo CNP, Slick Chan, CNP,  Oneyda Mayberry, LUIS, Nemo Hernández, CNP, Maxine Hughes, CNP, Falguni Bains, CNP, Rosa Cole CNP, GUILHERME Slater-ESSENCE, Beatriz Teixeira, CNP, Radha Garcia, CNP,  Charlotte Lawrence, CNP, Estelle Horta, CNP, Sonia Tejada, CNP,  Cristiana Simpson, CNS, Wen Diana CNP, Yani Byrne CNP,   Ro Dunn, CNP         [facility]   IN-PATIENT SERVICE   [facility]    Discharge Summary     Patient ID: Bandar Zapien  :  1955   MRN: [id number]     ACCOUNT:  [id number]   Patient's PCP: No primary care provider on file.  Admit Date: 3/8/2025   Discharge Date: 3/13/2025     Length of Stay: 5  Code Status:  Prior  Admitting Physician: Phillip Ricardo DO  Discharge Physician: Michael Pink DO     Active Discharge Diagnoses:     Hospital Problem Lists:  Principal Problem:    Enteritis  Active Problems:    Primary hypertension    Hydronephrosis with renal and ureteral calculus obstruction    GI bleed  Resolved Problems:    * No resolved hospital problems. *      Admission Condition:  fair     Discharged Condition: stable    Hospital Stay:     Hospital Course:  Bandar Zapien is a 69 y.o. male who was admitted for the management of  Enteritis , presented to ER with Vomiting (X 2

## 2025-03-12 NOTE — ANESTHESIA PRE PROCEDURE
Department of Anesthesiology  Preprocedure Note       Name:  Bandar Zapien   Age:  69 y.o.  :  1955                                          MRN:  [id number]         Date:  3/12/2025      Surgeon: Surgeon(s):  Sg Solomon MD    Procedure: Procedure(s):  CYSTO RIGHT STENT EXCHANGE  URETEROSCOPY  HOLMIUM LASER LITHOTRIPSY    Medications prior to admission:   Prior to Admission medications    Medication Sig Start Date End Date Taking? Authorizing Provider   metoprolol tartrate (LOPRESSOR) 25 MG tablet Take 1 tablet by mouth 2 times daily 3/12/25  Yes Michael Pink DO   amLODIPine (NORVASC) 10 MG tablet Take 1 tablet by mouth daily 3/13/25  Yes Michael Pink DO   tamsulosin (FLOMAX) 0.4 MG capsule Take 1 capsule by mouth daily 3/13/25  Yes Michael Pink DO   ketoconazole (NIZORAL) 2 % shampoo Apply 3-4 times weekly to scalp, leave on for five minutes prior to washing off  Patient not taking: Reported on 3/8/2025 3/4/25   Dariel Walton PA-C   clobetasol (TEMOVATE) 0.05 % external solution Apply to affected regions of scalp twice daily, as needed, for itching.  Do NOT use on face, groin, or armpits.  Patient not taking: Reported on 3/8/2025 3/4/25   Dariel Walton PA-C   ketoconazole (NIZORAL) 2 % cream Apply to affected regions of face twice daily, as needed, for scaling/itching.  Combine with triamcinolone 0.025% cream, 1:1, when using.  Patient not taking: Reported on 3/8/2025 3/4/25   Dariel Walton PA-C   triamcinolone (KENALOG) 0.025 % cream Apply to affected regions of face twice daily, as needed, for scaling/itching.  Combine with ketoconazole cream, 1:1, when using.  Patient not taking: Reported on 3/8/2025 3/4/25   Dariel Walton PA-C   ezetimibe (ZETIA) 10 MG tablet Take 1 tablet by mouth daily  Patient not taking: Reported on 3/8/2025 7/31/24   Maria Dolores Em MD   pantoprazole (PROTONIX) 40 MG tablet Take 1 tablet by mouth daily  Patient not taking: Reported on 3/8/2025 6/7/23

## 2025-03-13 VITALS
WEIGHT: 285.7 LBS | SYSTOLIC BLOOD PRESSURE: 145 MMHG | OXYGEN SATURATION: 95 % | TEMPERATURE: 98.4 F | DIASTOLIC BLOOD PRESSURE: 77 MMHG | HEIGHT: 72 IN | HEART RATE: 80 BPM | RESPIRATION RATE: 20 BRPM | BODY MASS INDEX: 38.7 KG/M2

## 2025-03-13 LAB
EKG ATRIAL RATE: 81 BPM
EKG P AXIS: 47 DEGREES
EKG P-R INTERVAL: 170 MS
EKG Q-T INTERVAL: 396 MS
EKG QRS DURATION: 94 MS
EKG QTC CALCULATION (BAZETT): 460 MS
EKG R AXIS: -7 DEGREES
EKG T AXIS: 4 DEGREES
EKG VENTRICULAR RATE: 81 BPM
GLUCOSE BLD-MCNC: 150 MG/DL (ref 75–110)
GLUCOSE BLD-MCNC: 182 MG/DL (ref 75–110)

## 2025-03-13 PROCEDURE — 6370000000 HC RX 637 (ALT 250 FOR IP): Performed by: UROLOGY

## 2025-03-13 PROCEDURE — 99232 SBSQ HOSP IP/OBS MODERATE 35: CPT | Performed by: INTERNAL MEDICINE

## 2025-03-13 PROCEDURE — 82947 ASSAY GLUCOSE BLOOD QUANT: CPT

## 2025-03-13 RX ORDER — ACETAMINOPHEN AND CODEINE PHOSPHATE 300; 30 MG/1; MG/1
1 TABLET ORAL EVERY 4 HOURS PRN
Qty: 20 TABLET | Refills: 0 | Status: SHIPPED | OUTPATIENT
Start: 2025-03-13 | End: 2025-03-16

## 2025-03-13 RX ORDER — CIPROFLOXACIN 2 MG/ML
400 INJECTION, SOLUTION INTRAVENOUS ONCE
Status: DISCONTINUED | OUTPATIENT
Start: 2025-03-13 | End: 2025-03-13

## 2025-03-13 RX ORDER — CIPROFLOXACIN 500 MG/1
500 TABLET, FILM COATED ORAL EVERY 12 HOURS SCHEDULED
Qty: 10 TABLET | Refills: 0 | Status: SHIPPED | OUTPATIENT
Start: 2025-03-13 | End: 2025-03-18

## 2025-03-13 RX ORDER — CIPROFLOXACIN 500 MG/1
500 TABLET, FILM COATED ORAL EVERY 12 HOURS SCHEDULED
Status: DISCONTINUED | OUTPATIENT
Start: 2025-03-13 | End: 2025-03-13 | Stop reason: HOSPADM

## 2025-03-13 RX ADMIN — TAMSULOSIN HYDROCHLORIDE 0.4 MG: 0.4 CAPSULE ORAL at 07:56

## 2025-03-13 RX ADMIN — AMLODIPINE BESYLATE 10 MG: 10 TABLET ORAL at 07:56

## 2025-03-13 RX ADMIN — METOPROLOL TARTRATE 25 MG: 25 TABLET, FILM COATED ORAL at 07:56

## 2025-03-13 RX ADMIN — INSULIN LISPRO 1 UNITS: 100 INJECTION, SOLUTION INTRAVENOUS; SUBCUTANEOUS at 00:19

## 2025-03-13 RX ADMIN — CIPROFLOXACIN HYDROCHLORIDE 500 MG: 500 TABLET, FILM COATED ORAL at 09:44

## 2025-03-13 RX ADMIN — PANTOPRAZOLE SODIUM 40 MG: 40 TABLET, DELAYED RELEASE ORAL at 05:59

## 2025-03-13 RX ADMIN — ACETAMINOPHEN 650 MG: 325 TABLET ORAL at 08:01

## 2025-03-13 NOTE — PROGRESS NOTES
Sg Solomon MD   Urology Progress Note            Subjective: Follow-up gross hematuria large bladder stone    Patient Vitals for the past 24 hrs:   BP Temp Temp src Pulse Resp SpO2 Height Weight   03/11/25 0449 (!) 154/84 98.1 °F (36.7 °C) Oral 80 18 93 % -- 131.6 kg (290 lb 2 oz)   03/11/25 0018 (!) 149/76 98.4 °F (36.9 °C) Oral 82 18 95 % -- --   03/10/25 2146 (!) 162/83 -- -- 81 -- -- -- --   03/10/25 2038 (!) 192/80 97.5 °F (36.4 °C) Oral 83 18 96 % -- --   03/10/25 2015 (!) 158/80 -- -- 82 18 97 % -- --   03/10/25 2000 (!) 154/81 -- -- 87 23 96 % -- --   03/10/25 1954 (!) 148/90 97.7 °F (36.5 °C) Temporal 88 15 98 % -- --   03/10/25 1543 (!) 161/85 98.6 °F (37 °C) Oral 85 18 95 % -- --   03/10/25 1354 -- -- -- -- -- -- 1.829 m (6' 0.01\") --   03/10/25 1115 (!) 149/84 98.2 °F (36.8 °C) Oral 81 18 94 % -- --   03/10/25 0730 (!) 160/87 98.4 °F (36.9 °C) Oral 80 18 96 % -- --       Intake/Output Summary (Last 24 hours) at 3/11/2025 0703  Last data filed at 3/11/2025 0103  Gross per 24 hour   Intake 500 ml   Output 500 ml   Net 0 ml       Recent Labs     03/08/25  1516 03/08/25  1808 03/09/25  0712 03/09/25  1327 03/10/25  0618   WBC 13.1*  --  11.8*  --  8.5   HGB 18.8*   < > 15.9 15.8 15.3   HCT 55.3*   < > 48.0 47.2 46.2   MCV 88.9  --  90.7  --  91.3     --  195  --  168    < > = values in this interval not displayed.     Recent Labs     03/08/25  1516 03/09/25  0712 03/10/25  0618    142 142   K 4.4 3.9 3.7    111* 111*   CO2 19* 19* 22   BUN 19 19 18   CREATININE 1.2 1.2 1.0       Recent Labs     03/08/25  1925 03/10/25  1934   COLORU Yellow Yellow   PHUR 5.5 5.5   WBCUA 5 TO 10 0 TO 2   RBCUA 2 TO 5 TOO NUMEROUS TO COUNT   MUCUS 2+*  --    BACTERIA RARE* MODERATE*   LEUKOCYTESUR TRACE* TRACE*   UROBILINOGEN Normal Normal   BILIRUBINUR NEGATIVE NEGATIVE       Additional Lab/culture results:    Physical Exam: Patient overall improved, no surgical intervention 
                                Sg Solomon MD   Urology Progress Note            Subjective: Follow-up hydronephrosis, bladder stone    Patient Vitals for the past 24 hrs:   BP Temp Temp src Pulse Resp SpO2 Weight   03/13/25 0413 134/71 98.2 °F (36.8 °C) Oral 83 17 94 % 129.6 kg (285 lb 11.2 oz)   03/12/25 2359 (!) 152/75 98.4 °F (36.9 °C) Oral 92 19 95 % --   03/12/25 1945 (!) 168/79 98.1 °F (36.7 °C) Oral 98 18 95 % --   03/12/25 1658 -- -- -- -- 18 -- --   03/12/25 1628 -- -- -- -- 20 -- --   03/12/25 1533 (!) 153/79 97.5 °F (36.4 °C) Oral 83 16 96 % --   03/12/25 1515 (!) 171/79 97.5 °F (36.4 °C) Temporal 84 17 94 % --   03/12/25 1500 (!) 177/85 -- -- 83 18 94 % --   03/12/25 1445 (!) 166/83 -- -- 83 19 93 % --   03/12/25 1430 (!) 158/89 -- -- 82 18 98 % --   03/12/25 1415 (!) 154/78 -- -- 77 16 97 % --   03/12/25 1400 122/80 -- -- 78 16 95 % --   03/12/25 1355 122/80 97.5 °F (36.4 °C) Temporal 77 17 95 % --   03/12/25 1045 (!) 159/74 -- -- 80 -- -- --   03/12/25 0830 (!) 161/82 97.3 °F (36.3 °C) Oral 82 16 95 % --       Intake/Output Summary (Last 24 hours) at 3/13/2025 0721  Last data filed at 3/13/2025 0338  Gross per 24 hour   Intake --   Output 4250 ml   Net -4250 ml       Recent Labs     03/11/25  1309   HGB 15.1   HCT 44.7     No results for input(s): \"NA\", \"K\", \"CL\", \"CO2\", \"PHOS\", \"BUN\", \"CREATININE\" in the last 72 hours.    Invalid input(s): \"CA\"    Recent Labs     03/10/25  1934 03/12/25  1209   COLORU Yellow Red*   PHUR 5.5 6.0   WBCUA 0 TO 2 2 TO 5   RBCUA TOO NUMEROUS TO COUNT TOO NUMEROUS TO COUNT   BACTERIA MODERATE*  --    LEUKOCYTESUR TRACE* TRACE*   UROBILINOGEN Normal Normal   BILIRUBINUR NEGATIVE NEGATIVE       Additional Lab/culture results:    Physical Exam: Patient status post laser lithotripsy for large bladder stone, difficult retrograde placement of ureteral stent, discussed with the patient discharge planning and outpatient laser lithotripsy    Interval Imaging 
        Gastroenterology Progress Note      Patient:   Bandar Zapien   :    1955   Facility:   [facility]  Date:     3/10/2025  Consultant:   NESTOR MARTIN    Subjective:     Patient seen and examined.   Doing better, pain improving, currently rates 3/10, no further n/v, no loose stools, no fevers.           Objective:   Vital Signs:  BP (!) 149/84   Pulse 81   Temp 98.2 °F (36.8 °C) (Oral)   Resp 18   Ht 1.829 m (6')   Wt 127 kg (280 lb)   SpO2 94%   BMI 37.97 kg/m²      Physical Exam:   General appearance: Alert, NAD  Lungs: CTA bilaterally    Heart:S1S2 RRR  Abdomen: Soft, NT, ND +BS, obese  Skin:  No jaundice, no stigmata of chronic liver disease.    Lab and Imaging Review   CBC with Differential:    Lab Results   Component Value Date/Time    WBC 8.5 03/10/2025 06:18 AM    RBC 5.06 03/10/2025 06:18 AM    HGB 15.3 03/10/2025 06:18 AM    HCT 46.2 03/10/2025 06:18 AM     03/10/2025 06:18 AM    MCV 91.3 03/10/2025 06:18 AM    MCH 30.2 03/10/2025 06:18 AM    MCHC 33.1 03/10/2025 06:18 AM    RDW 12.7 03/10/2025 06:18 AM    LYMPHOPCT 16 03/10/2025 06:18 AM    MONOPCT 8 03/10/2025 06:18 AM    EOSPCT 5 03/10/2025 06:18 AM    BASOPCT 0 03/10/2025 06:18 AM    MONOSABS 0.64 03/10/2025 06:18 AM    LYMPHSABS 1.38 03/10/2025 06:18 AM    EOSABS 0.44 03/10/2025 06:18 AM    BASOSABS 0.03 03/10/2025 06:18 AM     WBC:    Lab Results   Component Value Date/Time    WBC 8.5 03/10/2025 06:18 AM     Platelets:    Lab Results   Component Value Date/Time     03/10/2025 06:18 AM     Hemoglobin/Hematocrit:    Lab Results   Component Value Date/Time    HGB 15.3 03/10/2025 06:18 AM    HCT 46.2 03/10/2025 06:18 AM     CMP:    Lab Results   Component Value Date/Time     03/10/2025 06:18 AM    K 3.7 03/10/2025 06:18 AM     03/10/2025 06:18 AM    CO2 22 03/10/2025 06:18 AM    BUN 18 03/10/2025 06:18 AM    CREATININE 1.0 03/10/2025 06:18 AM    GFRAA >60 2022 10:33 AM    LABGLOM 
        Gastroenterology Progress Note      Patient:   Bandar Zapien   :    1955   Facility:   [facility]  Date:     3/11/2025  Consultant:   NESTOR MARTIN    Subjective:     Patient seen and examined.   Continues to improve, urology procedure yesterday noted, passing gas, denies abdominal pain.   No hematochezia or melena, tolerating a full liquid diet.     Objective:   Vital Signs:  BP (!) 144/72   Pulse 81   Temp 98.1 °F (36.7 °C) (Oral)   Resp 20   Ht 1.829 m (6' 0.01\")   Wt 131.6 kg (290 lb 2 oz)   SpO2 92%   BMI 39.34 kg/m²      Physical Exam:   General appearance: Alert, NAD  Lungs: CTA bilaterally    Heart:S1S2 RRR  Abdomen: Soft, NT, ND +BS, obese  Skin:  No jaundice, no stigmata of chronic liver disease.    Lab and Imaging Review   CBC with Differential:    Lab Results   Component Value Date/Time    WBC 8.5 03/10/2025 06:18 AM    RBC 5.06 03/10/2025 06:18 AM    HGB 15.3 03/10/2025 06:18 AM    HCT 46.2 03/10/2025 06:18 AM     03/10/2025 06:18 AM    MCV 91.3 03/10/2025 06:18 AM    MCH 30.2 03/10/2025 06:18 AM    MCHC 33.1 03/10/2025 06:18 AM    RDW 12.7 03/10/2025 06:18 AM    LYMPHOPCT 16 03/10/2025 06:18 AM    MONOPCT 8 03/10/2025 06:18 AM    EOSPCT 5 03/10/2025 06:18 AM    BASOPCT 0 03/10/2025 06:18 AM    MONOSABS 0.64 03/10/2025 06:18 AM    LYMPHSABS 1.38 03/10/2025 06:18 AM    EOSABS 0.44 03/10/2025 06:18 AM    BASOSABS 0.03 03/10/2025 06:18 AM     WBC:    Lab Results   Component Value Date/Time    WBC 8.5 03/10/2025 06:18 AM     Platelets:    Lab Results   Component Value Date/Time     03/10/2025 06:18 AM     Hemoglobin/Hematocrit:    Lab Results   Component Value Date/Time    HGB 15.3 03/10/2025 06:18 AM    HCT 46.2 03/10/2025 06:18 AM     CMP:    Lab Results   Component Value Date/Time     03/10/2025 06:18 AM    K 3.7 03/10/2025 06:18 AM     03/10/2025 06:18 AM    CO2 22 03/10/2025 06:18 AM    BUN 18 03/10/2025 06:18 AM    CREATININE 1.0 
       GASTROENTEROLOGY NOTE       Patient:   Bandar Zapien   :    1955   [facility]ility:   [facility]  Date:     3/13/2025  Consultant:   TOYIN Madera CNP      SUBJECTIVE:      No BM in 2 days.  Tolerating diet.  Denies nausea or vomiting.  No melena or hematochezia.    OBJECTIVE:   Vital Signs:  /83   Pulse 79   Temp 98.1 °F (36.7 °C) (Oral)   Resp 18   Ht 1.829 m (6' 0.01\")   Wt 129.6 kg (285 lb 11.2 oz)   SpO2 95%   BMI 38.74 kg/m²      Physical Exam:   General appearance: Alert, NAD  Lungs: CTA bilaterally, unlabored pattern  Heart: S1S2, RRR  Abdomen: Soft, NT, ND +BS  Skin/Musculoskeletal:  No jaundice. ROM normal.      Lab and Imaging Review   Recent Labs     25  1309   HGB 15.1     No results for input(s): \"INR\", \"PROTIME\" in the last 72 hours.      Impression:    Acute abdominal pain, suspecting ileus vs PSBO, improving      PLAN:    Recommend f/u in office to arrange for outpatient EGD and colonoscopy.  Call GI as needed      Sigrid Funes CNP    Plans were discussed with Dr. Caro      
Bellamy removed per Dr Solomon. Multiple blood lots passed after blelamy removed.   
CLINICAL PHARMACY NOTE: MEDS TO BEDS    Total # of Prescriptions Filled: 5   The following medications were delivered to the patient:  Metoprolol tart 25mg  Ekfyiafi1ii 10mg  Tamsulosin 0.4mg  Acetaminophen-codiene 300-30  Ciprofloxacin 500mg    Additional Documentation:  
Comprehensive Nutrition Assessment    Type and Reason for Visit:  Positive nutrition screen    Nutrition Recommendations/Plan:   Continue NPO  Monitor NPO status, labs, GI status     Malnutrition Assessment:  Malnutrition Status:  At risk for malnutrition (03/10/25 1413)    Context:  Acute Illness     Findings of the 6 clinical characteristics of malnutrition:  Energy Intake:  Mild decrease in energy intake  Weight Loss:  Mild weight loss     Body Fat Loss:  No body fat loss     Muscle Mass Loss:  No muscle mass loss    Fluid Accumulation:  No fluid accumulation     Strength:  Not Performed    Nutrition Assessment:    Pt admitted to hospital w/ sypmtomps of n/v and abd pain with distended abdomen. Pt reported to have bloody stools and emesis. Pt has been hypertensive since admission. PMH includes hyperlipidemia, IBS w/constipation, and HTN.  Pt had CT which showed an obstructing calculus versus calculi in the proximal right ureter with associated hydroureter and hydronephrosis, as well as concern for ileus versus a partial small bowel obstruction. Pt reports his appetite for solid food is still down, but he was tolerating clear liquid diet, although having some vomiting on 3/9. Pt is cucurrently NPO awaiting stent for R kidney. Continue current diet. Monitor po intake, labs, GI status.    Nutrition Related Findings:    active bowel sounds, distended abdomen, no edema Wound Type: None       Current Nutrition Intake & Therapies:    Average Meal Intake: NPO  Average Supplements Intake: None Ordered  Diet NPO    Anthropometric Measures:  Height: 182.9 cm (6' 0.01\")  Ideal Body Weight (IBW): 178 lbs (81 kg)    Admission Body Weight: 127 kg (279 lb 15.8 oz)  Current Body Weight: 127 kg (279 lb 15.8 oz), 157.3 % IBW. Weight Source: Not specified  Current BMI (kg/m2): 38  Usual Body Weight: 132.5 kg (292 lb 1.8 oz)     % Weight Change (Calculated): -4.2  Weight Adjustment For: No Adjustment                 BMI 
Dr Solomon called and reviewed CT.  Plan for cysto with stent placement. Pt changed back to NPO. Last sip of clear liquid at 0900.   
EKG completed. Results shared with Judith BETH.  NSR inferior infact, age undetermined.  Pt also urinated in urinal 300 ml; states after urinating, cold sweat sensation and abd pain gone.   
General Surgery:  Daily Progress Note                  PATIENT NAME: Bandar Zapien     TODAY'S DATE: 3/10/2025, 10:33 AM  CC:  Nausea, Vomit, Abdominal Distension    SUBJECTIVE:     Pt seen and examined at bedside.  Patient endorsed improved abdominal pain that was greater than 10/10 on admission and is currently 4/10. He reported a having a hernia for 15 years.  He denied nausea, vomit, diarrhea, fever, chills, chest pain, and SOB. Patient has remained hypertensive since admission. Otherwise vitals are unremarkable.    OBJECTIVE:   VITALS:  BP (!) 160/87   Pulse 80   Temp 98.4 °F (36.9 °C) (Oral)   Resp 18   Ht 1.829 m (6')   Wt 127 kg (280 lb)   SpO2 96%   BMI 37.97 kg/m²      INTAKE/OUTPUT:      Intake/Output Summary (Last 24 hours) at 3/10/2025 1033  Last data filed at 3/10/2025 0544  Gross per 24 hour   Intake 3188.34 ml   Output 1375 ml   Net 1813.34 ml       Physical Exam  Constitutional:       General: He is not in acute distress.     Appearance: He is obese.   HENT:      Head: Normocephalic.      Right Ear: External ear normal.      Left Ear: External ear normal.      Mouth/Throat:      Pharynx: Oropharynx is clear.   Eyes:      Extraocular Movements: Extraocular movements intact.   Pulmonary:      Effort: No respiratory distress.   Abdominal:      Palpations: Abdomen is soft.      Tenderness: There is no abdominal tenderness. There is no guarding or rebound.      Comments: Obese abdomen  (+) Hernia   Musculoskeletal:         General: No swelling or tenderness.   Skin:     General: Skin is warm.      Findings: No bruising or erythema.   Neurological:      Mental Status: He is alert and oriented to person, place, and time.       Labs:  Recent Labs     03/10/25  0618 03/09/25  1327 03/09/25  0712 03/08/25  1808 03/08/25  1516   WBC 8.5  --  11.8*  --  13.1*   HGB 15.3 15.8 15.9   < > 18.8*   HCT 46.2 47.2 48.0   < > 55.3*   MCV 91.3  --  90.7  --  88.9     --  195  --  250    < > = values in 
General Surgery:  Daily Progress Note                  PATIENT NAME: Bandar Zapien     TODAY'S DATE: 3/11/2025, 6:38 AM  CC:  Nausea, Vomit, Abdominal Distension    SUBJECTIVE:     Pt seen and examined at bedside.  No acute events ported overnight.  Has been hypertensive but vitals have otherwise remained within normal limits.  Has been doing well since his urology procedure yesterday.  Passing flatus.    OBJECTIVE:   VITALS:  BP (!) 154/84   Pulse 80   Temp 98.1 °F (36.7 °C) (Oral)   Resp 18   Ht 1.829 m (6' 0.01\")   Wt 131.6 kg (290 lb 2 oz)   SpO2 93%   BMI 39.34 kg/m²      INTAKE/OUTPUT:      Intake/Output Summary (Last 24 hours) at 3/11/2025 0638  Last data filed at 3/11/2025 0103  Gross per 24 hour   Intake 500 ml   Output 500 ml   Net 0 ml       Physical Exam  Constitutional:       General: He is not in acute distress.     Appearance: He is obese.   HENT:      Head: Normocephalic.      Right Ear: External ear normal.      Left Ear: External ear normal.      Mouth/Throat:      Pharynx: Oropharynx is clear.   Eyes:      Extraocular Movements: Extraocular movements intact.   Pulmonary:      Effort: No respiratory distress.   Abdominal:      Palpations: Abdomen is soft.      Tenderness: There is no abdominal tenderness. There is no guarding or rebound.      Comments: Obese abdomen  (+) Hernia   Musculoskeletal:         General: No swelling or tenderness.   Skin:     General: Skin is warm.      Findings: No bruising or erythema.   Neurological:      Mental Status: He is alert and oriented to person, place, and time.       Labs:  Recent Labs     03/10/25  0618 03/09/25  1327 03/09/25  0712 03/08/25  1808 03/08/25  1516   WBC 8.5  --  11.8*  --  13.1*   HGB 15.3 15.8 15.9   < > 18.8*   HCT 46.2 47.2 48.0   < > 55.3*   MCV 91.3  --  90.7  --  88.9     --  195  --  250    < > = values in this interval not displayed.        Lab Results   Component Value Date/Time     03/10/2025 06:18 AM    K 3.7 
[facility]  Office: [phone]  Parker Glover DO, Sriram Barber DO, Michael Pink DO, Phillip Ricardo DO, Hailey Fna MD, Sherrill Pablo MD, Kaykay Hobbs MD, Radha Kraft MD,  Ben Trejo MD, Rebecca Bucio MD, Leatha Dominique MD,  Bruce Douglas DO, Tresa Oneal MD, Ian Hanson MD, Bandar Glover DO, Misti Pedraza MD,  Delbert Melissa DO, Molly Chavez MD, Rachel Aguilera MD, Haley Coe MD, Edi Parker MD,  Daniel Kang MD, Tony Ferguson MD, Kristy Blackwell MD, Marixa Luo MD, Tariq Malik MD, Prudence Urbano MD, Slick Little DO, Ten Lopez MD, Bruce Peters MD, Mohsin Reza, MD, Shirley Waterhouse, CNP,  Vanesa Pelayo CNP, Slick Chan, CNP,  Oneyda Mayberry, DNP, Nemo Hernández, CNP, Maxine Hughes, CNP, Falguni Bains, CNP, Rosa Cole, CNP, Judith Kim PA-C, Beatriz Teixeira, CNP, Radha Garcia, CNP,  Charlotte Lawrence, CNP, Estelle Horta, CNP, Sonia Tejada, CNP,  Cristiana Simpson, CNS, Wen Diana CNP, Yani Byrne, CNP,   Ro Dunn, CNP       [facility]   IN-PATIENT SERVICE  [facility]       3/11/2025    12:24 PM    Name:   Bandar Zapien  MRN:     [id number]     Acct:      [id number]   Room:   [facility]/1008-02  IP Day:  3  Admit Date:  3/8/2025  2:47 PM    PCP:   No primary care provider on file.  Code Status:  Full Code    Called to the floor by staff to evaluate Bandar Zapien in [facility]8/1008-02 at 12:24 PM with complaint of dizziness.  Patient was seen and evaluated, states that he has had intermittent dizziness/lightheadedness that is associated with diaphoresis.  He is seated upright in the recliner.  His dizziness is not associated with movement.  He states that he continues to have abdominal bloating, although he recently advanced his diet from clear liquids to full liquids and is otherwise tolerating it well.  He denies any chest pain, shortness of breath, nausea, vomiting.  He denies any other pain or acute 
[facility]  Office: [phone]  Parker Glover DO, Sriram Barber DO, Michael Pink DO, Phillip Ricardo DO, Hailey Fan MD, Sherrill Pablo MD, Kaykay Hobbs MD, Radha Kraft MD,  Ben Trejo MD, Rebecca Bucio MD, Leatha Dominique MD,  Bruce Douglas DO, Tresa Oneal MD, Ian Hanson MD, Bandar Glover DO, Misti Pedraza MD,  Delbert Melissa DO, Molly Chavez MD, Rachel Aguilera MD, Haley Coe MD, Edi Parker MD,  Daniel Kang MD, Tony Ferguson MD, Kristy Blackwell MD, Marixa Luo MD, Tariq Malik MD, Prudence Urbano MD, Slick Little DO, Ten Lopez MD, Bruce Peters MD, Mohsin Reza, MD, Shirley Waterhouse, CNP,  Vanesa Pelayo CNP, Slick Chan, CNP,  Oneyda Mayberry, LUIS, Nemo Hernández, CNP, Maxine Hughes, CNP, Falguni Bains, CNP, Rosa Cole CNP, Judith Kim, PA-C, Beatriz Teixeira, CNP, Radha Garcia, CNP,  Charlotte Lawrence, CNP, Estelle Horta, CNP, Sonia Tejada, CNP,  Cristiana Simpson, CNS, Wen Diana CNP, Yani Byrne CNP,   Ro Dunn, CNP         [facility]   IN-PATIENT SERVICE   [facility]    Progress Note    3/9/2025    11:56 AM    Name:   Bandar Zapien  MRN:     [id number]     Acct:      [id number]   Room:   1008/1008-02   Day:  1  Admit Date:  3/8/2025  2:47 PM    PCP:   No primary care provider on file.  Code Status:  Full Code    Subjective:     C/C:   Chief Complaint   Patient presents with    Vomiting     X 2 days    Abdominal Pain    Diarrhea    Cough     Reports blood    Rectal Bleeding     Interval History Status: improved.     Abd less distended today  Has periumbilical abd pain: hernia there  Diarrhea slowing down, nausea better too.  Had bloody emesis    Denies cp/sob    Brief History:     Per my DAVID:  \"Bandar Zapien is a 69 y.o. Non- / non  male who presents with Vomiting (X 2 days), Abdominal Pain, Diarrhea, Cough (Reports blood), and Rectal Bleeding   and is admitted to the 
Patient discharged home independently. Patient successfully voided prior to discharge. Appointment was made with Dr. Solomon for Monday at 8 am. Patient verbalizes understanding to all discharge instructions. PIV removed without issues. Patient taken to lobby via wheelchair for discharge, all personal belongings sent with patient.   
Pt NG removed per order. Pt remained calm and cooperative. Total output was 175 ml. Care ongoing.   
Pt admitted to room 1008 from ED. Admission documentation complete, vitals taken and telemetry placed. Pt oriented to room and unit routine, including hourly rounding. Call light in reach and safety maintained. Will continue to provide support and education.    
Pt up to cysto via cart.  
[facility]  Office: [phone]  Parker Glover DO, Sriram Barber DO, Michael Pink DO, Phillip Ricardo DO, Hailey Fan MD, Sherrill Pablo MD, Kaykay Hobbs MD, Radha Kraft MD,  Ben Trejo MD, Rebecca Bucio MD, Leatha Dominique MD,  Bruce Douglas DO, Tresa Oneal MD, Ian Hanson MD, Bandar Glover DO, Misti Pedraza MD,  Delbert Melissa DO, Molly Chavez MD, Rachel Aguilera MD, Haley Coe MD, Edi Parker MD,  Daniel Kang MD, Tony Ferguson MD, Kristy Blackwell MD, Marixa Luo MD, Tariq Malik MD, Prudence Urbano MD, Slick Little DO, Ten Lopez MD, Bruce Peters MD, Mohsin Reza, MD, Shirley Waterhouse, CNP,  Vanesa Pelayo CNP, Slick Chan, CNP,  Oneyda Mayberry, LUIS, Nemo Hernández, CNP, Maxine Hughes, CNP, Falguni Bains, CNP, Rosa Cole CNP, Judith Kim, PA-C, Beatriz Teixeira, CNP, Radha Garcia, CNP,  Charlotte Lawrence, CNP, Estelle Horta, CNP, Sonia Tejada, CNP,  Cristiana Simpson, CNS, Wen Diana CNP, Yani Byrne CNP,   Ro Dunn, CNP         [facility]   IN-PATIENT SERVICE   [facility]    Progress Note    3/11/2025    9:12 AM    Name:   Bandar Zapien  MRN:     [id number]     Acct:      [id number]   Room:   1008/1008-02   Day:  3  Admit Date:  3/8/2025  2:47 PM    PCP:   No primary care provider on file.  Code Status:  Full Code    Subjective:     C/C:   Chief Complaint   Patient presents with    Vomiting     X 2 days    Abdominal Pain    Diarrhea    Cough     Reports blood    Rectal Bleeding     Interval History Status: improved.     Patient is up to chair, has some increased abdominal bloating this morning.  Denies any complaints of chest pain, shortness of breath, nausea vomiting, fevers or chills or acute complaints peer    Brief History:     Per my DAVID:  \"Bandar Zapien is a 69 y.o. Non- / non  male who presents with Vomiting (X 2 days), Abdominal Pain, Diarrhea, Cough (Reports blood), 
Spiritual Health History and Assessment/Progress Note  [facility]    (P) Initial Encounter,  ,  ,      Name: Bandar Zapien MRN: [id number]    Age: 69 y.o.     Sex: male   Language: English   Yazidism: Spiritism   Enteritis     Date: 3/11/2025            Total Time Calculated: (P) 3 min              Spiritual Assessment began in [facility]        Referral/Consult From: (P) Rounding   Encounter Overview/Reason: (P) Initial Encounter  Service Provided For: (P) Patient    Patient engaged with  explaining pleasantly what he needed most from the  would be to let him get needed sleep.  expressed understanding and patient thanked .    Rhina, Belief, Meaning:   Patient unable to assess at this time  Family/Friends No family/friends present      Importance and Influence:  Patient has no beliefs influential to healthcare decision-making identified during this visit  Family/Friends No family/friends present    Community:  Patient Other: a son and daughter are listed in chart as family contacts  Family/Friends No family/friends present    Assessment and Plan of Care:     Patient Interventions include: Facilitated expression of thoughts and feelings  Family/Friends Interventions include: No family/friends present    Patient Plan of Care: Spiritual Care available upon further referral  Family/Friends Plan of Care: Spiritual Care available upon further referral    Electronically signed by Chaplain Carla on 3/11/2025 at 2:29 PM   
[facility]  Office: [phone]  Parker Glover DO, Sriram Barber DO, Michael Pink DO, Phillip Ricardo DO, Hailey Fan MD, Sherrill Pablo MD, Kaykay Hobbs MD, Radha Kraft MD,  Ben Trejo MD, Rebecca Bucio MD, Leatha Dominique MD,  Bruce Douglas DO, Tresa Oneal MD, Ian Hanson MD, Bandar Glover DO, Misti Pedraza MD,  Delbert Melissa DO, Molly Chavez MD, Rachel Aguilera MD, Haley Coe MD, Edi Parker MD,  Daniel Kang MD, Tony Ferguson MD, Kristy Blackwell MD, Marixa Luo MD, Tariq Malik MD, Prudence Urbano MD, Slick Little DO, Ten Lopez MD, Bruce Peters MD, Mohsin Reza, MD, Shirley Waterhouse, CNP,  Vanesa Pelayo CNP, Slick Chan, CNP,  Oneyda Mayberry, LUIS, Nemo Hernández, CNP, Maxine Hughes, CNP, Falguni Bains, CNP, Rosa Cole CNP, Judith Kim, PA-C, Beatriz Teixeira, CNP, Radha Garcia, CNP,  Charlotte Lawrence, CNP, Estelle Horta, CNP, Sonia Tejada, CNP,  Cristiana Simpson, CNS, Wen Diana CNP, Yani Byrne CNP,   Ro Dunn, CNP         [facility]   IN-PATIENT SERVICE   [facility]    Progress Note    3/13/2025    9:25 AM    Name:   Bandar Zapien  MRN:     [id number]     Acct:      [id number]   Room:   [facility]8/1008-02   Day:  5  Admit Date:  3/8/2025  2:47 PM    PCP:   No primary care provider on file.  Code Status:  Full Code    Subjective:     C/C:   Chief Complaint   Patient presents with    Vomiting     X 2 days    Abdominal Pain    Diarrhea    Cough     Reports blood    Rectal Bleeding     Interval History Status: improved.     Patient is resting in bed, De La Cruz removed this morning per urology.  Denies complaints of chest pain, shortness of breath, nausea vomiting, fevers or chills.    Brief History:     Per my DAVID:  \"Bandar Zapien is a 69 y.o. Non- / non  male who presents with Vomiting (X 2 days), Abdominal Pain, Diarrhea, Cough (Reports blood), and Rectal Bleeding   and is admitted 
Writer sent a secured perfect serve to urology. Pt bladder scanned showing 475 ml of urine, voided 225 ml standing at bedside using urinal, post void bladder scan showed 328 ml of urine still in bladder. Waiting for response. Care ongoing.     Update 1530: Urology states continue Flomax and bladder scan PRN if having pelvic pressure. Care ongoing.   
[] The Home Med List was verified for accuracy and marked COMPLETED. The following sources were used to assist with Medication Reconciliation:    [] Med Rec Pharmacy tech has already completed home med list in the ED and note reviewed   [] Patient med list was transcribed into the EHR and verified for accuracy.(Note method of verification)  [] Patient provided bottles of their medications for validation  [x] Medications reviewed and confirmed with HERBERT SANDOVAL- patient (Please list name and relationship to patient)  [] Contacted patient's pharmacy to confirm home medications (Please list the pharmacy)  [] Home medications reviewed using Dispense Report   [] Contacted physician office to confirm home medications  [] Medical Records received from another facility (list facility and place copy placed in chart)  []  was notified regarding changes to home med list via on 3/8/2025 at the following time:        [x] There are one or more home medications that need clarification before Medication Reconciliation can be marked completed. The Med List Status has been marked as In Progress.   To assist with Home Medication Reconciliation the following actions have been taken:    [] Family requested to bring medications in their original bottles to the hospital for verification  [] Family requested to call hospital with list of medications  [] Call to physicians off and left message (list physician and who they spoke to, date and time)  [] Request for medical records made to   [] MAR from facility requested to be faxed over  [] Unable to complete due to patient condition  [] Oncoming nurse Edna notified of incomplete med list for follow up  [x] Other: medications verified and updated in home med list, unable to provide second verification - pt pharmacy closed.      *Effective communication is essential throughout this process. It is important for the nurse to document the progress of the med rec to keep team members informed. 
dextrose bolus **OR** dextrose bolus, glucagon (rDNA), dextrose, morphine **OR** morphine, ondansetron **OR** ondansetron, polyethylene glycol, acetaminophen **OR** acetaminophen    Data:     Past Medical History:   has a past medical history of Hyperlipidemia, Hypertension, and PSA elevation.    Social History:   reports that he has never smoked. He has never used smokeless tobacco. He reports that he does not drink alcohol and does not use drugs.     Family History:   Family History   Problem Relation Age of Onset    Diabetes Mother     Diabetes Father        Vitals:  BP (!) 160/87   Pulse 80   Temp 98.4 °F (36.9 °C) (Oral)   Resp 18   Ht 1.829 m (6')   Wt 127 kg (280 lb)   SpO2 96%   BMI 37.97 kg/m²   Temp (24hrs), Av.4 °F (36.9 °C), Min:97.9 °F (36.6 °C), Max:99 °F (37.2 °C)    Recent Labs     25  1146 25  1644 25  2338 03/10/25  0425   POCGLU 126* 105 111* 138*       I/O (24Hr):    Intake/Output Summary (Last 24 hours) at 3/10/2025 1014  Last data filed at 3/10/2025 0544  Gross per 24 hour   Intake 3188.34 ml   Output 1375 ml   Net 1813.34 ml       Labs:  Hematology:  Recent Labs     25  1516 25  1808 25  0712 25  1327 03/10/25  0618   WBC 13.1*  --  11.8*  --  8.5   RBC 6.22*  --  5.29  --  5.06   HGB 18.8*   < > 15.9 15.8 15.3   HCT 55.3*   < > 48.0 47.2 46.2   MCV 88.9  --  90.7  --  91.3   MCH 30.2  --  30.1  --  30.2   MCHC 34.0  --  33.1  --  33.1   RDW 12.4  --  12.7  --  12.7     --  195  --  168   MPV 10.6  --  10.7  --  10.6    < > = values in this interval not displayed.     Chemistry:  Recent Labs     25  1516 25  0712 03/10/25  0618    142 142   K 4.4 3.9 3.7    111* 111*   CO2 19* 19* 22   GLUCOSE 149* 136* 118*   BUN 19 19 18   CREATININE 1.2 1.2 1.0   ANIONGAP 17* 12 10   LABGLOM 65 68 80   CALCIUM 9.8 8.5* 8.3*     Recent Labs     25  1516 25  0059 25  0807 25  1146 25  1644 
\"PCO2\", \"POCPO2\", \"PO2ART\", \"PO2\", \"POCHCO3\", \"XSE0OAP\", \"HCO3\", \"NBEA\", \"PBEA\", \"BEART\", \"BE\", \"THGBART\", \"THB\", \"ENV7MNE\", \"EPPL5TKU\", \"J7JOYREK\", \"O2SAT\", \"FIO2\"  Lab Results   Component Value Date/Time    SPECIAL Site: Urine 03/08/2025 07:25 PM     Lab Results   Component Value Date/Time    CULTURE NO GROWTH 3 DAYS 03/08/2025 07:25 PM    CULTURE NO GROWTH 03/08/2025 07:25 PM       Radiology:  CTA ABDOMEN PELVIS W WO CONTRAST  Result Date: 3/8/2025  No evidence of active GI bleed. Obstructing calculus versus calculi in the proximal right ureter with associated hydroureter and hydronephrosis. Nonobstructing left nephrolithiasis. Left bladder calculus. Diverticulosis without evidence of diverticulitis. Findings concerning for small bowel ileus versus partial small bowel obstruction. Cholelithiasis. Prostatomegaly. Additional findings noted above.       Physical Examination:        General appearance:  alert, cooperative and no distress  Mental Status:  oriented to person, place and time and normal affect  Lungs:  clear to auscultation bilaterally, normal effort  Heart:  regular rate and rhythm, no murmur  Abdomen:  soft, nontender, mild distended, normal bowel sounds, no masses, hepatomegaly, splenomegaly  Extremities:  no edema, redness, tenderness in the calves  Skin:  no gross lesions, rashes, induration    Assessment:        Hospital Problems           Last Modified POA    * (Principal) Enteritis 3/9/2025 Yes    Primary hypertension 3/9/2025 Yes    Hydronephrosis with renal and ureteral calculus obstruction 3/8/2025 Yes    GI bleed 3/8/2025 Yes       Plan:        Lithotripsy today per urology  Advance diet as tolerated  Antihypertensives as ordered  Activity as tolerated  Trend H&H, transfuse as needed  Activity as tolerated  Possible discharge later today pending lithotripsy findings    Michael Pink DO  3/12/2025  9:11 AM

## 2025-03-13 NOTE — PLAN OF CARE
Problem: Discharge Planning  Goal: Discharge to home or other facility with appropriate resources  Outcome: Progressing  Flowsheets (Taken 3/12/2025 2014)  Discharge to home or other facility with appropriate resources:   Identify barriers to discharge with patient and caregiver   Arrange for needed discharge resources and transportation as appropriate   Identify discharge learning needs (meds, wound care, etc)   Arrange for interpreters to assist at discharge as needed   Refer to discharge planning if patient needs post-hospital services based on physician order or complex needs related to functional status, cognitive ability or social support system     Problem: Pain  Goal: Verbalizes/displays adequate comfort level or baseline comfort level  Outcome: Progressing  Flowsheets (Taken 3/12/2025 1945)  Verbalizes/displays adequate comfort level or baseline comfort level:   Encourage patient to monitor pain and request assistance   Assess pain using appropriate pain scale   Administer analgesics based on type and severity of pain and evaluate response   Implement non-pharmacological measures as appropriate and evaluate response   Consider cultural and social influences on pain and pain management   Notify Licensed Independent Practitioner if interventions unsuccessful or patient reports new pain     Problem: Skin/Tissue Integrity  Goal: Skin integrity remains intact  Description: 1.  Monitor for areas of redness and/or skin breakdown  2.  Assess vascular access sites hourly  3.  Every 4-6 hours minimum:  Change oxygen saturation probe site  4.  Every 4-6 hours:  If on nasal continuous positive airway pressure, respiratory therapy assess nares and determine need for appliance change or resting period  Outcome: Progressing  Flowsheets (Taken 3/12/2025 2014)  Skin Integrity Remains Intact:   Monitor for areas of redness and/or skin breakdown   Assess vascular access sites hourly   Every 4-6 hours minimum: Change oxygen

## 2025-03-14 ENCOUNTER — CARE COORDINATION (OUTPATIENT)
Dept: CARE COORDINATION | Age: 70
End: 2025-03-14

## 2025-03-14 LAB
MICROORGANISM SPEC CULT: NORMAL
MICROORGANISM SPEC CULT: NORMAL
SERVICE CMNT-IMP: NORMAL
SERVICE CMNT-IMP: NORMAL
SPECIMEN DESCRIPTION: NORMAL
SPECIMEN DESCRIPTION: NORMAL

## 2025-03-14 NOTE — CARE COORDINATION
Care Transitions Note    Initial Call - Call within 2 business days of discharge: Yes    Attempted to reach patient for transitions of care follow up. Unable to reach patient.    Outreach Attempts:   HIPAA compliant voicemail left for patient.     Patient: Bandar Zapien    Patient : 1955   MRN: [id number]    Reason for Admission: Generalized abdominal pain   Discharge Date: 3/13/25  RURS: Readmission Risk Score: 7.6    Last Discharge Facility       Date Complaint Diagnosis Description Type Department Provider    3/8/25 Vomiting; Abdominal Pain; Diarrhea; Cough; Rectal Bleeding Generalized abdominal pain ... ED to Hosp-Admission (Discharged) (ADMITTED) Michael Garza, DO; [place],...          # 1 attempt- unable to reach patient, left vm message with name and call back information, will follow//JU  Was this an external facility discharge? No    Follow Up Appointment:   Patient does not have a follow up appointment scheduled at time of call.     Future Appointments         Provider Specialty Dept Phone    2025 11:30 AM Dariel Walton PA-C Dermatology [phone]            Plan for follow-up on next business day.  2nd attempt 24 hr HFU call    Ayanna Salazar RN

## 2025-03-16 LAB
STONE COMPOSITION: NORMAL
STONE DESCRIPTION: NORMAL
STONE MASS: 2736 MG

## 2025-03-17 ENCOUNTER — CARE COORDINATION (OUTPATIENT)
Dept: CARE COORDINATION | Age: 70
End: 2025-03-17

## 2025-03-17 DIAGNOSIS — K52.9 ENTERITIS: Primary | ICD-10-CM

## 2025-03-17 PROCEDURE — 1111F DSCHRG MED/CURRENT MED MERGE: CPT

## 2025-03-17 NOTE — CARE COORDINATION
infection, may kill some good germs along with the bad. This  allows bacteria like Clostridium difficile (C. difficile) to take over and cause diarrhea. Should you experience  diarrhea or painful stomach cramps, you need to let your physician know immediately as this is serious and  may require treatment.  FUTURE RESISTANT INFECTION  Another serious side effects the risk of getting an antibiotic-resistant infection later. This type of infection is  often more difficult to treat and, in some cases, can lead to serious disability or even death.  Review of patient management of conditions/medications:     Care Summary Note: Writer spoke to patient, he is doing ok, reviewed discharge instructions and medications, denied any c/o fever, chills, n/v/d sob or chest pain, still has some bleeding/ still has string from stent, not to pull out at this time, has f/u  with Dr. Solomon today, no vns or rpm, needs to get a new pcp , is going to call Dr. Reynoso's office about getting a new patient appt, explained role of CTN provided contact information, reviewed s/s of infection, will follow//JU    Care Transition Nurse reviewed discharge instructions, medical action plan, and red flags with patient. The patient was given an opportunity to ask questions; all questions answered at this time.. The patient verbalized understanding.   Were discharge instructions available to patient? Yes.   Reviewed appropriate site of care based on symptoms and resources available to patient including: PCP  Specialist  Urgent care clinics  When to call 911. The patient agrees to contact the primary care provider and/or specialist office for questions related to their healthcare.      Advance Care Planning:   Does patient have an Advance Directive: reviewed and current.    Medication Reconciliation:  Medication reconciliation was performed with patient,1111F entered: N/A.     Remote Patient Monitoring:  Offered patient enrollment in the Remote Patient

## 2025-03-18 ENCOUNTER — TRANSCRIBE ORDERS (OUTPATIENT)
Dept: ADMINISTRATIVE | Age: 70
End: 2025-03-18

## 2025-03-18 DIAGNOSIS — N20.0 KIDNEY STONE: Primary | ICD-10-CM

## 2025-03-25 ENCOUNTER — CARE COORDINATION (OUTPATIENT)
Dept: CARE COORDINATION | Age: 70
End: 2025-03-25

## 2025-03-25 NOTE — CARE COORDINATION
Care Transitions Note    Follow Up Call     Patient Current Location:  Home: [address]  [place] [address]    [facility] Care Coordinator contacted the patient by telephone. Verified name and  as identifiers.    Additional needs identified to be addressed with provider   No needs identified                 Method of communication with provider: none.    Care Summary Note: Writer spoke with Bandar for a follow up care transitions call. He states he is doing \"terrible\". Per patient he is still having flank pain with hematuria. He still has his stent in place and is seeing Dr Solomon on 25 for a follow up.He denies fever or chills and is eating and drinking without difficulty. He has many complaints regarding his wife's declining health and her home health company. 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 He reports he is not able to leave her to to go to appointments. She has [facility] currently but he has many c/o regarding her [facility]-he reports the have been through many different companies over the past several years. He wanted info on how to report his complaints. Advised he contact a supervisor at her [employer] company to voice his concerns. Per patient the have told him to F off and don;t take him seriously. Writer also suggested the [employer] he reports they have not been helpful either and he reports he is going to call the governor. He would like a social work referral to see if he qualifies for Medicaid he states he is unable to pay his increasing medical bills.     Plan of care updates since last contact:  Review of patient management of conditions/medications:         Advance Care Planning:   Does patient have an Advance Directive:  not on file .    Medication Review:  No changes since last call.     Remote Patient Monitoring:  Offered patient enrollment in the Remote Patient Monitoring (RPM) program for in-home monitoring: Patient is not eligible for RPM program because:

## 2025-03-26 ENCOUNTER — CARE COORDINATION (OUTPATIENT)
Dept: CARE COORDINATION | Age: 70
End: 2025-03-26

## 2025-03-26 NOTE — CARE COORDINATION
Patient was referred to  by Lorie Adair/CARMELA,  who stated that this patient is requesting assistance with applying  for Medicaid.  HC contacted the patient and discussed the situation  and shared how he has so many medical cost and bills.  HC engaged  in conversation with the patient and inquired if he has applied for   Medicaid in previous times.  He mentioned that one of his providers  had given him paperwork, and that JFS might have it.  HC also   suggested that the patient contact [employer] HELP program to apply  for financial assistance.  Patient stated that he's unsure if he has  been approved.  HC and patient will follow up with the HELP   department .        Plan of Care  HC will contact patient on 03/27/25   @7:45a and call JFS to complete a   Medicaid application and/or get the status  of the patient's eligibility.  HC will also make contact with the [employer]  HELP program and status for patient.

## 2025-03-27 ENCOUNTER — CARE COORDINATION (OUTPATIENT)
Dept: CARE COORDINATION | Age: 70
End: 2025-03-27

## 2025-03-27 NOTE — CARE COORDINATION
HC attempted to contact the patient this morning,  to make contact JFS to get his status on Medicaid.  There was no answer, HC left a message and contact  information for a return call.    Plan of Care  HC will await a return call from patient,  or HC will reach out to patient again...

## 2025-04-01 ENCOUNTER — APPOINTMENT (OUTPATIENT)
Dept: GENERAL RADIOLOGY | Age: 70
End: 2025-04-01
Attending: UROLOGY
Payer: MEDICARE

## 2025-04-01 ENCOUNTER — ANESTHESIA (OUTPATIENT)
Dept: OPERATING ROOM | Age: 70
End: 2025-04-01
Payer: MEDICARE

## 2025-04-01 ENCOUNTER — HOSPITAL ENCOUNTER (OUTPATIENT)
Age: 70
Setting detail: OUTPATIENT SURGERY
Discharge: HOME | End: 2025-04-01
Attending: UROLOGY | Admitting: UROLOGY
Payer: MEDICARE

## 2025-04-01 ENCOUNTER — CARE COORDINATION (OUTPATIENT)
Dept: CARE COORDINATION | Age: 70
End: 2025-04-01

## 2025-04-01 ENCOUNTER — ANESTHESIA EVENT (OUTPATIENT)
Dept: OPERATING ROOM | Age: 70
End: 2025-04-01
Payer: MEDICARE

## 2025-04-01 VITALS
SYSTOLIC BLOOD PRESSURE: 138 MMHG | RESPIRATION RATE: 12 BRPM | HEART RATE: 74 BPM | DIASTOLIC BLOOD PRESSURE: 71 MMHG | OXYGEN SATURATION: 98 % | TEMPERATURE: 98.2 F

## 2025-04-01 DIAGNOSIS — N20.1 RIGHT URETERAL CALCULUS: Primary | ICD-10-CM

## 2025-04-01 LAB
BILIRUB UR QL STRIP: NEGATIVE
CLARITY UR: ABNORMAL
COLOR UR: YELLOW
EPI CELLS #/AREA URNS HPF: NORMAL /HPF (ref 0–5)
GLUCOSE UR STRIP-MCNC: NEGATIVE MG/DL
HGB UR QL STRIP.AUTO: ABNORMAL
KETONES UR STRIP-MCNC: NEGATIVE MG/DL
LEUKOCYTE ESTERASE UR QL STRIP: ABNORMAL
NITRITE UR QL STRIP: NEGATIVE
PH UR STRIP: 7 [PH] (ref 5–8)
PROT UR STRIP-MCNC: ABNORMAL MG/DL
RBC #/AREA URNS HPF: NORMAL /HPF (ref 0–2)
SP GR UR STRIP: 1.02 (ref 1–1.03)
UROBILINOGEN UR STRIP-ACNC: NORMAL EU/DL (ref 0–1)
WBC #/AREA URNS HPF: NORMAL /HPF (ref 0–5)

## 2025-04-01 PROCEDURE — 74018 RADEX ABDOMEN 1 VIEW: CPT

## 2025-04-01 PROCEDURE — 6360000002 HC RX W HCPCS: Performed by: SPECIALIST

## 2025-04-01 PROCEDURE — 7100000011 HC PHASE II RECOVERY - ADDTL 15 MIN: Performed by: UROLOGY

## 2025-04-01 PROCEDURE — 87086 URINE CULTURE/COLONY COUNT: CPT

## 2025-04-01 PROCEDURE — 2580000003 HC RX 258: Performed by: SPECIALIST

## 2025-04-01 PROCEDURE — 81001 URINALYSIS AUTO W/SCOPE: CPT

## 2025-04-01 PROCEDURE — 2580000003 HC RX 258: Performed by: ANESTHESIOLOGY

## 2025-04-01 PROCEDURE — 3600000012 HC SURGERY LEVEL 2 ADDTL 15MIN: Performed by: UROLOGY

## 2025-04-01 PROCEDURE — 3600000002 HC SURGERY LEVEL 2 BASE: Performed by: UROLOGY

## 2025-04-01 PROCEDURE — 3700000001 HC ADD 15 MINUTES (ANESTHESIA): Performed by: UROLOGY

## 2025-04-01 PROCEDURE — 2709999900 HC NON-CHARGEABLE SUPPLY: Performed by: UROLOGY

## 2025-04-01 PROCEDURE — C2617 STENT, NON-COR, TEM W/O DEL: HCPCS | Performed by: UROLOGY

## 2025-04-01 PROCEDURE — 7100000000 HC PACU RECOVERY - FIRST 15 MIN: Performed by: UROLOGY

## 2025-04-01 PROCEDURE — 7100000001 HC PACU RECOVERY - ADDTL 15 MIN: Performed by: UROLOGY

## 2025-04-01 PROCEDURE — 6360000002 HC RX W HCPCS: Performed by: UROLOGY

## 2025-04-01 PROCEDURE — C1747 HC ENDOSCOPE, SINGLE, URINARY TRACT: HCPCS | Performed by: UROLOGY

## 2025-04-01 PROCEDURE — 2720000010 HC SURG SUPPLY STERILE: Performed by: UROLOGY

## 2025-04-01 PROCEDURE — 3700000000 HC ANESTHESIA ATTENDED CARE: Performed by: UROLOGY

## 2025-04-01 PROCEDURE — C1769 GUIDE WIRE: HCPCS | Performed by: UROLOGY

## 2025-04-01 PROCEDURE — C1758 CATHETER, URETERAL: HCPCS | Performed by: UROLOGY

## 2025-04-01 PROCEDURE — 7100000010 HC PHASE II RECOVERY - FIRST 15 MIN: Performed by: UROLOGY

## 2025-04-01 DEVICE — URETERAL STENT
Type: IMPLANTABLE DEVICE | Site: URETER | Status: FUNCTIONAL
Brand: POLARIS™ ULTRA

## 2025-04-01 RX ORDER — SUCCINYLCHOLINE/SOD CL,ISO/PF 100 MG/5ML
SYRINGE (ML) INTRAVENOUS
Status: DISCONTINUED | OUTPATIENT
Start: 2025-04-01 | End: 2025-04-01 | Stop reason: SDUPTHER

## 2025-04-01 RX ORDER — SODIUM CHLORIDE 0.9 % (FLUSH) 0.9 %
5-40 SYRINGE (ML) INJECTION EVERY 12 HOURS SCHEDULED
Status: DISCONTINUED | OUTPATIENT
Start: 2025-04-01 | End: 2025-04-01 | Stop reason: HOSPADM

## 2025-04-01 RX ORDER — PROPOFOL 10 MG/ML
INJECTION, EMULSION INTRAVENOUS
Status: DISCONTINUED | OUTPATIENT
Start: 2025-04-01 | End: 2025-04-01 | Stop reason: SDUPTHER

## 2025-04-01 RX ORDER — CIPROFLOXACIN 500 MG/1
500 TABLET, FILM COATED ORAL EVERY 12 HOURS
COMMUNITY
Start: 2025-03-27

## 2025-04-01 RX ORDER — SODIUM CHLORIDE 0.9 % (FLUSH) 0.9 %
5-40 SYRINGE (ML) INJECTION PRN
Status: DISCONTINUED | OUTPATIENT
Start: 2025-04-01 | End: 2025-04-01 | Stop reason: HOSPADM

## 2025-04-01 RX ORDER — OXYCODONE HYDROCHLORIDE 5 MG/1
5 TABLET ORAL
Status: DISCONTINUED | OUTPATIENT
Start: 2025-04-01 | End: 2025-04-01 | Stop reason: HOSPADM

## 2025-04-01 RX ORDER — DEXAMETHASONE SODIUM PHOSPHATE 10 MG/ML
INJECTION, SOLUTION INTRAMUSCULAR; INTRAVENOUS
Status: DISCONTINUED | OUTPATIENT
Start: 2025-04-01 | End: 2025-04-01 | Stop reason: SDUPTHER

## 2025-04-01 RX ORDER — FENTANYL CITRATE 50 UG/ML
INJECTION, SOLUTION INTRAMUSCULAR; INTRAVENOUS
Status: DISCONTINUED | OUTPATIENT
Start: 2025-04-01 | End: 2025-04-01 | Stop reason: SDUPTHER

## 2025-04-01 RX ORDER — NALOXONE HYDROCHLORIDE 0.4 MG/ML
INJECTION, SOLUTION INTRAMUSCULAR; INTRAVENOUS; SUBCUTANEOUS PRN
Status: DISCONTINUED | OUTPATIENT
Start: 2025-04-01 | End: 2025-04-01 | Stop reason: HOSPADM

## 2025-04-01 RX ORDER — FENTANYL CITRATE 50 UG/ML
25 INJECTION, SOLUTION INTRAMUSCULAR; INTRAVENOUS EVERY 5 MIN PRN
Status: DISCONTINUED | OUTPATIENT
Start: 2025-04-01 | End: 2025-04-01 | Stop reason: HOSPADM

## 2025-04-01 RX ORDER — SODIUM CHLORIDE, SODIUM LACTATE, POTASSIUM CHLORIDE, CALCIUM CHLORIDE 600; 310; 30; 20 MG/100ML; MG/100ML; MG/100ML; MG/100ML
INJECTION, SOLUTION INTRAVENOUS
Status: DISCONTINUED | OUTPATIENT
Start: 2025-04-01 | End: 2025-04-01 | Stop reason: SDUPTHER

## 2025-04-01 RX ORDER — METOCLOPRAMIDE HYDROCHLORIDE 5 MG/ML
10 INJECTION INTRAMUSCULAR; INTRAVENOUS
Status: DISCONTINUED | OUTPATIENT
Start: 2025-04-01 | End: 2025-04-01 | Stop reason: HOSPADM

## 2025-04-01 RX ORDER — SODIUM CHLORIDE 9 MG/ML
INJECTION, SOLUTION INTRAVENOUS CONTINUOUS
Status: DISCONTINUED | OUTPATIENT
Start: 2025-04-01 | End: 2025-04-01 | Stop reason: HOSPADM

## 2025-04-01 RX ORDER — ONDANSETRON 2 MG/ML
INJECTION INTRAMUSCULAR; INTRAVENOUS
Status: DISCONTINUED | OUTPATIENT
Start: 2025-04-01 | End: 2025-04-01 | Stop reason: SDUPTHER

## 2025-04-01 RX ORDER — HALOPERIDOL 5 MG/ML
1 INJECTION INTRAMUSCULAR
Status: DISCONTINUED | OUTPATIENT
Start: 2025-04-01 | End: 2025-04-01 | Stop reason: HOSPADM

## 2025-04-01 RX ORDER — HYDROCODONE BITARTRATE AND ACETAMINOPHEN 5; 325 MG/1; MG/1
1 TABLET ORAL EVERY 4 HOURS PRN
Qty: 18 TABLET | Refills: 0 | Status: SHIPPED | OUTPATIENT
Start: 2025-04-01 | End: 2025-04-04

## 2025-04-01 RX ORDER — CIPROFLOXACIN 2 MG/ML
400 INJECTION, SOLUTION INTRAVENOUS ONCE
Status: COMPLETED | OUTPATIENT
Start: 2025-04-01 | End: 2025-04-01

## 2025-04-01 RX ORDER — LIDOCAINE HYDROCHLORIDE 20 MG/ML
INJECTION, SOLUTION EPIDURAL; INFILTRATION; INTRACAUDAL; PERINEURAL
Status: DISCONTINUED | OUTPATIENT
Start: 2025-04-01 | End: 2025-04-01 | Stop reason: SDUPTHER

## 2025-04-01 RX ORDER — SODIUM CHLORIDE 9 MG/ML
INJECTION, SOLUTION INTRAVENOUS PRN
Status: DISCONTINUED | OUTPATIENT
Start: 2025-04-01 | End: 2025-04-01 | Stop reason: HOSPADM

## 2025-04-01 RX ORDER — HYDROMORPHONE HYDROCHLORIDE 1 MG/ML
0.5 INJECTION, SOLUTION INTRAMUSCULAR; INTRAVENOUS; SUBCUTANEOUS EVERY 5 MIN PRN
Status: DISCONTINUED | OUTPATIENT
Start: 2025-04-01 | End: 2025-04-01 | Stop reason: HOSPADM

## 2025-04-01 RX ORDER — LIDOCAINE HYDROCHLORIDE 10 MG/ML
1 INJECTION, SOLUTION EPIDURAL; INFILTRATION; INTRACAUDAL; PERINEURAL
Status: DISCONTINUED | OUTPATIENT
Start: 2025-04-02 | End: 2025-04-01 | Stop reason: HOSPADM

## 2025-04-01 RX ORDER — SODIUM CHLORIDE, SODIUM LACTATE, POTASSIUM CHLORIDE, CALCIUM CHLORIDE 600; 310; 30; 20 MG/100ML; MG/100ML; MG/100ML; MG/100ML
INJECTION, SOLUTION INTRAVENOUS CONTINUOUS
Status: DISCONTINUED | OUTPATIENT
Start: 2025-04-01 | End: 2025-04-01 | Stop reason: HOSPADM

## 2025-04-01 RX ORDER — CIPROFLOXACIN 500 MG/1
500 TABLET, FILM COATED ORAL 2 TIMES DAILY
Qty: 14 TABLET | Refills: 0 | Status: SHIPPED | OUTPATIENT
Start: 2025-04-01 | End: 2025-04-08

## 2025-04-01 RX ADMIN — LIDOCAINE HYDROCHLORIDE 100 MG: 20 INJECTION, SOLUTION EPIDURAL; INFILTRATION; INTRACAUDAL; PERINEURAL at 13:07

## 2025-04-01 RX ADMIN — ONDANSETRON 4 MG: 2 INJECTION, SOLUTION INTRAMUSCULAR; INTRAVENOUS at 13:52

## 2025-04-01 RX ADMIN — DEXAMETHASONE SODIUM PHOSPHATE 4 MG: 10 INJECTION, SOLUTION INTRAMUSCULAR; INTRAVENOUS at 13:15

## 2025-04-01 RX ADMIN — FENTANYL CITRATE 50 MCG: 50 INJECTION INTRAMUSCULAR; INTRAVENOUS at 13:07

## 2025-04-01 RX ADMIN — SODIUM CHLORIDE, SODIUM LACTATE, POTASSIUM CHLORIDE, AND CALCIUM CHLORIDE: .6; .31; .03; .02 INJECTION, SOLUTION INTRAVENOUS at 11:30

## 2025-04-01 RX ADMIN — SODIUM CHLORIDE, POTASSIUM CHLORIDE, SODIUM LACTATE AND CALCIUM CHLORIDE: 600; 310; 30; 20 INJECTION, SOLUTION INTRAVENOUS at 11:25

## 2025-04-01 RX ADMIN — CIPROFLOXACIN 400 MG: 2 INJECTION, SOLUTION INTRAVENOUS at 13:15

## 2025-04-01 RX ADMIN — PROPOFOL 200 MG: 10 INJECTION, EMULSION INTRAVENOUS at 13:07

## 2025-04-01 RX ADMIN — Medication 100 MG: at 13:07

## 2025-04-01 RX ADMIN — FENTANYL CITRATE 50 MCG: 50 INJECTION INTRAMUSCULAR; INTRAVENOUS at 13:51

## 2025-04-01 ASSESSMENT — PAIN SCALES - GENERAL
PAINLEVEL_OUTOF10: 0
PAINLEVEL_OUTOF10: 0

## 2025-04-01 ASSESSMENT — PAIN - FUNCTIONAL ASSESSMENT: PAIN_FUNCTIONAL_ASSESSMENT: 0-10

## 2025-04-01 NOTE — OP NOTE
full 03/11/25 0600   Status Draining 03/11/25 0600   Output (mL) 400 mL 03/11/25 0103       [REMOVED] Urinary Catheter 03/12/25 3 Way (Removed)   Catheter Indications Perioperative use for selected surgical procedures 03/13/25 0800   Site Assessment Dane 03/13/25 0800   Urine Color Diluted 03/13/25 0800   Urine Appearance Clear 03/13/25 0800   Collection Container Standard 03/13/25 0800   Securement Method Leg strap 03/13/25 0800   Catheter Care  Soap and water 03/12/25 2014   Catheter Best Practices  Drainage tube clipped to bed;Catheter secured to thigh;Tamper seal intact;Bag below bladder;Bag not on floor;Lack of dependent loop in tubing;Drainage bag less than half full 03/13/25 0800   Status Continuous bladder irrigation 03/13/25 0800   Rate Slow 03/13/25 0341   Irrigant Normal saline 03/13/25 0341   CBI Irrigation Intake (mL) 3000 mL 03/13/25 0338   CBI De La Cruz Output (mL) 3725 mL 03/13/25 0338   CBI Net Output (mL) 725 mL 03/13/25 0338   Output (mL) 800 mL 03/13/25 0917       Findings:  Infection Present At Time Of Surgery (PATOS) (choose all levels that have infection present):  No infection present  Other Findings: Indications: 69-year-old male, with large proximal right ureteral calculus impacted, Calculus was difficult to dislodge requiring stent placement, patient scheduled at this time for cystoscopy laser lithotripsy and stent exchange    Detailed Description of Procedure:   Patient was brought to the operating room, positioned in dorsolithotomy, proper patient identification procedure identification prepping and draping.    We entered the bladder with the cystoscope, examination of the bladder revealed no tumors ulcers or stones.    This patient has a large prostate, the median lobe is protruding inside the bladder and affecting the right ureteral vesicle junction.    The stent was identified and gently grasped and retrieved, a Glidewire was inserted in the right ureter, we then proceeded with insertion

## 2025-04-01 NOTE — ANESTHESIA POSTPROCEDURE EVALUATION
Department of Anesthesiology  Postprocedure Note    Patient: Bandar Zapien  MRN: [id number]  YOB: 1955  Date of evaluation: 4/1/2025    Procedure Summary       Date: 04/01/25 Room / Location: [facility]    Anesthesia Start: 1301 Anesthesia Stop: 1410    Procedure: CYSTOSCOPY, RIGHT STENT EXCHANGE, URETEROSCOPY, LASER LITHOTRIPSY (Right) Diagnosis:       Right ureteral calculus      (Right ureteral calculus [N20.1])    Surgeons: Sg Solomon MD Responsible Provider: Мария Landeros MD    Anesthesia Type: general ASA Status: 2            Anesthesia Type: No value filed.    Evy Phase I: Evy Score: 8    Evy Phase II:      Anesthesia Post Evaluation    Patient location during evaluation: PACU  Patient participation: complete - patient participated  Level of consciousness: awake  Airway patency: patent  Nausea & Vomiting: no nausea  Cardiovascular status: blood pressure returned to baseline  Respiratory status: acceptable  Hydration status: euvolemic  Comments: Multimodal analgesia pain management as indicated by procedure  Multimodal analgesia pain management approach  Pain management: adequate    No notable events documented.

## 2025-04-01 NOTE — ANESTHESIA PRE PROCEDURE
MD Angelica   lubiprostone (AMITIZA) 8 MCG CAPS capsule Take 1 capsule by mouth daily  Patient not taking: Reported on 7/17/2022 8/20/21 7/17/22  Maria Dolores Em MD       Current medications:    Current Facility-Administered Medications   Medication Dose Route Frequency Provider Last Rate Last Admin    [START ON 4/2/2025] lidocaine PF 1 % injection 1 mL  1 mL IntraDERmal Once PRN Carito Davis MD        0.9 % sodium chloride infusion   IntraVENous Continuous Carito Davis MD        lactated ringers infusion   IntraVENous Continuous Carito Davis MD           Allergies:    Allergies   Allergen Reactions    Latex     Amitiza [Lubiprostone] Angioedema    Armoracia Rusticana Ext (Horseradish) Hives    Coconut (Cocos Nucifera)     Esomeprazole Magnesium       Also a product that is in Nexium, patient unsure of name    Pcn [Penicillins]        Problem List:    Patient Active Problem List   Diagnosis Code    Angio-edema T78.3XXA    Hyperlipidemia E78.5    Primary hypertension I10    PSA elevation R97.20    Irritable bowel syndrome with constipation K58.1    Urinary tract infection in male N39.0    Hydronephrosis with renal and ureteral calculus obstruction N13.2    Enteritis K52.9    GI bleed K92.2       Past Medical History:        Diagnosis Date    Hyperlipidemia     Hypertension     PSA elevation        Past Surgical History:        Procedure Laterality Date    CYSTOSCOPY N/A 3/10/2025    CYSTOSCOPY ,YEUNG PLACEMENT, PYLOGRAM BILATERAL performed by Sg Solomon MD at [facility]    LITHOTRIPSY N/A 3/12/2025    CYSTOSCOPY,   URETEROSCOPY , HOLMIUM LASER LITHOTRIPSY, BLADDER STONE REMOVAL, RETROGRADE PYELOGRAM , RIGHT STENT PLACEMENT performed by Sg Solomon MD at [facility]    PROSTATE BIOPSY      PROSTATE BIOPSY N/A 6/2/2021    PROSTATE BIOPSY WITH ULTRASOUND, OFFICE NOTIFYING ULTRASOUND performed by Trung Boles MD at [facility]       Social History:    Social History     Tobacco Use    Smoking status: Never

## 2025-04-01 NOTE — CARE COORDINATION
It appears that the patient is inpatient presently.      Plan of Care  HC will watch patients chart for discharge

## 2025-04-01 NOTE — H&P
69-year-old very pleasant male with history of stone disease, obstructing calculus in the mid ureter wedged with difficulty and bypassing with stent presently patient scheduled for laser lithotripsy I concur with the findings of history and physical on record  
  ketoconazole (NIZORAL) 2 % cream Apply to affected regions of face twice daily, as needed, for scaling/itching.  Combine with triamcinolone 0.025% cream, 1:1, when using.  Patient not taking: Reported on 3/8/2025 3/4/25   Dariel Walton PA-C   triamcinolone (KENALOG) 0.025 % cream Apply to affected regions of face twice daily, as needed, for scaling/itching.  Combine with ketoconazole cream, 1:1, when using.  Patient not taking: Reported on 3/8/2025 3/4/25   Dariel Walton PA-C   ezetimibe (ZETIA) 10 MG tablet Take 1 tablet by mouth daily  Patient not taking: Reported on 3/8/2025 7/31/24   Maria Dolores Em MD   pantoprazole (PROTONIX) 40 MG tablet Take 1 tablet by mouth daily  Patient not taking: Reported on 3/8/2025 6/7/23   ProviderAngelica MD   lubiprostone (AMITIZA) 8 MCG CAPS capsule Take 1 capsule by mouth daily  Patient not taking: Reported on 7/17/2022 8/20/21 7/17/22  Maria Dolores Em MD         This is a 69 y.o. male who is pleasant, cooperative, alert and oriented x3, in no acute distress.    Heart: Heart sounds are normal. HR 74 regular rate and rhythm without murmur, gallop or rub.   Lungs: Normal respiratory effort with equal expansion, good air exchange, unlabored and clear to auscultation without wheezes or rales bilaterally   Abdomen: Obese. R flank tenderness, soft, nondistended with bowel sounds active.       Labs:  Recent Labs     03/11/25  1309 03/10/25  0618 03/08/25  1808 03/08/25  1516   HGB 15.1 15.3   < > 18.8*   HCT 44.7 46.2   < > 55.3*   WBC  --  8.5   < > 13.1*   MCV  --  91.3   < > 88.9   PLT  --  168   < > 250   NA  --  142   < > 140   K  --  3.7   < > 4.4   CL  --  111*   < > 104   CO2  --  22   < > 19*   BUN  --  18   < > 19   CREATININE  --  1.0   < > 1.2   GLUCOSE  --  118*   < > 149*   AST  --   --   --  27   ALT  --   --   --  30    < > = values in this interval not displayed.       No results for input(s): \"COVID19\" in the last 720 hours.    Courtney Ruiz, APRN

## 2025-04-02 LAB
MICROORGANISM SPEC CULT: NO GROWTH
SPECIMEN DESCRIPTION: NORMAL

## 2025-04-03 ENCOUNTER — CARE COORDINATION (OUTPATIENT)
Dept: CARE COORDINATION | Age: 70
End: 2025-04-03

## 2025-04-03 NOTE — CARE COORDINATION
Care Transitions Note    Follow Up Call     Attempted to reach patient for transitions of care follow up.  Unable to reach patient.      Outreach Attempts:   HIPAA compliant voicemail left for patient.     Care Summary Note:     Follow Up Appointment:   Future Appointments         Provider Specialty Dept Phone    7/16/2025 11:00 AM Dariel Walton PA-C Dermatology [phone]            Plan for follow-up on next business day.  based on severity of symptoms and risk factors. Plan for next call:  2nd attempt sub call    Ayanna Salazar RN

## 2025-04-04 ENCOUNTER — CARE COORDINATION (OUTPATIENT)
Dept: CARE COORDINATION | Age: 70
End: 2025-04-04

## 2025-04-04 NOTE — CARE COORDINATION
Care Transitions Note    Follow Up Call     Attempted to reach patient for transitions of care follow up.  Unable to reach patient.      Outreach Attempts:   Multiple attempts to contact patient at phone numbers on file.   HIPAA compliant voicemail left for patient.     Care Summary Note: 2nd attempt-will end care transitions if no return call received.     Follow Up Appointment:   Future Appointments         Provider Specialty Dept Phone    7/16/2025 11:00 AM Dariel Walton PA-C Dermatology [phone]            No further follow-up call indicated based on severity of symptoms and risk factors.     Lorie Adair LPN

## 2025-04-18 ENCOUNTER — CARE COORDINATION (OUTPATIENT)
Dept: CARE COORDINATION | Age: 70
End: 2025-04-18

## 2025-04-18 NOTE — CARE COORDINATION
HC phoned patient to follow up on his contact with S/Medicaid.  He stated that he was in the hospital and unable to follow through   with a few things.  Patient did mention that he found out through  the [employer] that he's not eligible for any benefits.  Patient didn't seem   to have all of the information but willing to revisit the situation, as  well as check into Medicaid, and [facility]'s HELP program.      Plan of Care  HC will follow up with patient on 04/23/25 around 11a

## 2025-04-23 ENCOUNTER — CARE COORDINATION (OUTPATIENT)
Dept: CARE COORDINATION | Age: 70
End: 2025-04-23

## 2025-04-23 NOTE — CARE COORDINATION
HC contacted the patient to make plans to speak with  the [facility] on a conference call.  HC inquired if the patient   Would prefer 04/25/25 or on 04/28/25.  Patient agreed  That we could make the call on 04/25/25 @ 8:30a.    Patient then stated that they're not going to do anything  And it will be a waste of time.  HC then attempted to   Encourage the patient and he said that it's no use calling.      Plan of Care  HC informed the patient that   she will follow up with him in a few weeks

## 2025-05-09 ENCOUNTER — CARE COORDINATION (OUTPATIENT)
Dept: CARE COORDINATION | Age: 70
End: 2025-05-09

## 2025-05-09 NOTE — CARE COORDINATION
HC phoned the patient to see if he had a change of heart   regarding making a call to the V A,.  Patient stated that   the V A had contacted him and everything is cleared up,  and he had spoken to the State General and he can now  get his benefits.  HC congratulated the patient and told him  if he has any additional social needs to call.      Plan of Care  HC will today sign off of this patient

## 2025-05-12 ENCOUNTER — TELEPHONE (OUTPATIENT)
Dept: FAMILY MEDICINE CLINIC | Age: 70
End: 2025-05-12

## 2025-06-10 RX ORDER — EZETIMIBE 10 MG/1
10 TABLET ORAL DAILY
Qty: 90 TABLET | Refills: 0 | OUTPATIENT
Start: 2025-06-10

## 2025-06-17 ENCOUNTER — APPOINTMENT (OUTPATIENT)
Dept: VASCULAR LAB | Age: 70
DRG: 322 | End: 2025-06-17
Attending: EMERGENCY MEDICINE
Payer: OTHER GOVERNMENT

## 2025-06-17 ENCOUNTER — APPOINTMENT (OUTPATIENT)
Dept: GENERAL RADIOLOGY | Age: 70
DRG: 322 | End: 2025-06-17
Payer: OTHER GOVERNMENT

## 2025-06-17 ENCOUNTER — APPOINTMENT (OUTPATIENT)
Dept: CT IMAGING | Age: 70
DRG: 322 | End: 2025-06-17
Payer: OTHER GOVERNMENT

## 2025-06-17 ENCOUNTER — HOSPITAL ENCOUNTER (INPATIENT)
Age: 70
LOS: 2 days | Discharge: HOME | DRG: 322 | End: 2025-06-19
Attending: EMERGENCY MEDICINE | Admitting: INTERNAL MEDICINE
Payer: OTHER GOVERNMENT

## 2025-06-17 DIAGNOSIS — R60.9 EDEMA: ICD-10-CM

## 2025-06-17 DIAGNOSIS — I21.4 NSTEMI (NON-ST ELEVATED MYOCARDIAL INFARCTION) (HCC): ICD-10-CM

## 2025-06-17 DIAGNOSIS — R07.9 CHEST PAIN, UNSPECIFIED TYPE: ICD-10-CM

## 2025-06-17 DIAGNOSIS — Z98.61 POST PTCA: Primary | ICD-10-CM

## 2025-06-17 PROBLEM — R60.0 BILATERAL LOWER EXTREMITY EDEMA: Status: ACTIVE | Noted: 2025-06-17

## 2025-06-17 LAB
ANION GAP SERPL CALCULATED.3IONS-SCNC: 12 MMOL/L (ref 9–16)
ANTI-XA UNFRAC HEPARIN: 0.14 IU/L (ref 0.3–0.7)
ANTI-XA UNFRAC HEPARIN: <0.1 IU/L (ref 0.3–0.7)
BASOPHILS # BLD: 0.06 K/UL (ref 0–0.2)
BASOPHILS NFR BLD: 1 % (ref 0–2)
BNP SERPL-MCNC: 69 PG/ML (ref 0–125)
BUN SERPL-MCNC: 16 MG/DL (ref 8–23)
CALCIUM SERPL-MCNC: 9.2 MG/DL (ref 8.8–10.2)
CHLORIDE SERPL-SCNC: 107 MMOL/L (ref 98–107)
CO2 SERPL-SCNC: 23 MMOL/L (ref 20–31)
CREAT SERPL-MCNC: 1.1 MG/DL (ref 0.7–1.2)
EKG ATRIAL RATE: 87 BPM
EKG P AXIS: 53 DEGREES
EKG P-R INTERVAL: 166 MS
EKG Q-T INTERVAL: 374 MS
EKG QRS DURATION: 92 MS
EKG QTC CALCULATION (BAZETT): 450 MS
EKG R AXIS: -5 DEGREES
EKG T AXIS: 23 DEGREES
EKG VENTRICULAR RATE: 87 BPM
EOSINOPHIL # BLD: 0.18 K/UL (ref 0–0.44)
EOSINOPHILS RELATIVE PERCENT: 3 % (ref 1–4)
ERYTHROCYTE [DISTWIDTH] IN BLOOD BY AUTOMATED COUNT: 12.8 % (ref 11.8–14.4)
GFR, ESTIMATED: 72 ML/MIN/1.73M2
GLUCOSE SERPL-MCNC: 133 MG/DL (ref 82–115)
HCT VFR BLD AUTO: 44.5 % (ref 40.7–50.3)
HGB BLD-MCNC: 15 G/DL (ref 13–17)
IMM GRANULOCYTES # BLD AUTO: 0.02 K/UL (ref 0–0.3)
IMM GRANULOCYTES NFR BLD: 0 %
INR PPP: 1
LYMPHOCYTES NFR BLD: 1.41 K/UL (ref 1.1–3.7)
LYMPHOCYTES RELATIVE PERCENT: 24 % (ref 24–43)
MCH RBC QN AUTO: 30.4 PG (ref 25.2–33.5)
MCHC RBC AUTO-ENTMCNC: 33.7 G/DL (ref 28.4–34.8)
MCV RBC AUTO: 90.1 FL (ref 82.6–102.9)
MONOCYTES NFR BLD: 0.48 K/UL (ref 0.1–1.2)
MONOCYTES NFR BLD: 8 % (ref 3–12)
NEUTROPHILS NFR BLD: 64 % (ref 36–65)
NEUTS SEG NFR BLD: 3.76 K/UL (ref 1.5–8.1)
NRBC BLD-RTO: 0 PER 100 WBC
PARTIAL THROMBOPLASTIN TIME: 26.8 SEC (ref 23.9–33.8)
PLATELET # BLD AUTO: 174 K/UL (ref 138–453)
PMV BLD AUTO: 10.2 FL (ref 8.1–13.5)
POTASSIUM SERPL-SCNC: 4 MMOL/L (ref 3.7–5.3)
PROTHROMBIN TIME: 13 SEC (ref 11.5–14.2)
RBC # BLD AUTO: 4.94 M/UL (ref 4.21–5.77)
SODIUM SERPL-SCNC: 142 MMOL/L (ref 136–145)
TROPONIN I SERPL HS-MCNC: 130 NG/L (ref 0–22)
TROPONIN I SERPL HS-MCNC: 136 NG/L (ref 0–22)
TROPONIN I SERPL HS-MCNC: 136 NG/L (ref 0–22)
TROPONIN I SERPL HS-MCNC: 138 NG/L (ref 0–22)
WBC OTHER # BLD: 5.9 K/UL (ref 3.5–11.3)

## 2025-06-17 PROCEDURE — 71046 X-RAY EXAM CHEST 2 VIEWS: CPT

## 2025-06-17 PROCEDURE — 6370000000 HC RX 637 (ALT 250 FOR IP)

## 2025-06-17 PROCEDURE — 93970 EXTREMITY STUDY: CPT

## 2025-06-17 PROCEDURE — 83880 ASSAY OF NATRIURETIC PEPTIDE: CPT

## 2025-06-17 PROCEDURE — 2000000000 HC ICU R&B

## 2025-06-17 PROCEDURE — 85520 HEPARIN ASSAY: CPT

## 2025-06-17 PROCEDURE — 36415 COLL VENOUS BLD VENIPUNCTURE: CPT

## 2025-06-17 PROCEDURE — 6360000002 HC RX W HCPCS: Performed by: EMERGENCY MEDICINE

## 2025-06-17 PROCEDURE — 2500000003 HC RX 250 WO HCPCS

## 2025-06-17 PROCEDURE — 80048 BASIC METABOLIC PNL TOTAL CA: CPT

## 2025-06-17 PROCEDURE — 2580000003 HC RX 258: Performed by: EMERGENCY MEDICINE

## 2025-06-17 PROCEDURE — 93010 ELECTROCARDIOGRAM REPORT: CPT | Performed by: INTERNAL MEDICINE

## 2025-06-17 PROCEDURE — 96374 THER/PROPH/DIAG INJ IV PUSH: CPT

## 2025-06-17 PROCEDURE — 99222 1ST HOSP IP/OBS MODERATE 55: CPT

## 2025-06-17 PROCEDURE — 99285 EMERGENCY DEPT VISIT HI MDM: CPT

## 2025-06-17 PROCEDURE — 93005 ELECTROCARDIOGRAM TRACING: CPT | Performed by: EMERGENCY MEDICINE

## 2025-06-17 PROCEDURE — 6360000002 HC RX W HCPCS

## 2025-06-17 PROCEDURE — 6360000004 HC RX CONTRAST MEDICATION: Performed by: EMERGENCY MEDICINE

## 2025-06-17 PROCEDURE — 85610 PROTHROMBIN TIME: CPT

## 2025-06-17 PROCEDURE — 85025 COMPLETE CBC W/AUTO DIFF WBC: CPT

## 2025-06-17 PROCEDURE — 71260 CT THORAX DX C+: CPT

## 2025-06-17 PROCEDURE — 85730 THROMBOPLASTIN TIME PARTIAL: CPT

## 2025-06-17 PROCEDURE — 84484 ASSAY OF TROPONIN QUANT: CPT

## 2025-06-17 PROCEDURE — 96375 TX/PRO/DX INJ NEW DRUG ADDON: CPT

## 2025-06-17 PROCEDURE — 2500000003 HC RX 250 WO HCPCS: Performed by: EMERGENCY MEDICINE

## 2025-06-17 RX ORDER — NITROGLYCERIN 20 MG/100ML
5-200 INJECTION INTRAVENOUS CONTINUOUS
Status: DISCONTINUED | OUTPATIENT
Start: 2025-06-17 | End: 2025-06-18

## 2025-06-17 RX ORDER — ONDANSETRON 4 MG/1
4 TABLET, ORALLY DISINTEGRATING ORAL EVERY 8 HOURS PRN
Status: DISCONTINUED | OUTPATIENT
Start: 2025-06-17 | End: 2025-06-19 | Stop reason: HOSPADM

## 2025-06-17 RX ORDER — POTASSIUM CHLORIDE 7.45 MG/ML
10 INJECTION INTRAVENOUS PRN
Status: DISCONTINUED | OUTPATIENT
Start: 2025-06-17 | End: 2025-06-19 | Stop reason: HOSPADM

## 2025-06-17 RX ORDER — HEPARIN SODIUM 10000 [USP'U]/100ML
5-30 INJECTION, SOLUTION INTRAVENOUS CONTINUOUS
Status: DISCONTINUED | OUTPATIENT
Start: 2025-06-17 | End: 2025-06-18

## 2025-06-17 RX ORDER — SODIUM CHLORIDE 0.9 % (FLUSH) 0.9 %
10 SYRINGE (ML) INJECTION ONCE
Status: COMPLETED | OUTPATIENT
Start: 2025-06-17 | End: 2025-06-17

## 2025-06-17 RX ORDER — ATORVASTATIN CALCIUM 80 MG/1
80 TABLET, FILM COATED ORAL NIGHTLY
Status: DISCONTINUED | OUTPATIENT
Start: 2025-06-17 | End: 2025-06-19 | Stop reason: HOSPADM

## 2025-06-17 RX ORDER — TAMSULOSIN HYDROCHLORIDE 0.4 MG/1
0.4 CAPSULE ORAL DAILY
Status: DISCONTINUED | OUTPATIENT
Start: 2025-06-17 | End: 2025-06-19 | Stop reason: HOSPADM

## 2025-06-17 RX ORDER — FUROSEMIDE 10 MG/ML
20 INJECTION INTRAMUSCULAR; INTRAVENOUS ONCE
Status: COMPLETED | OUTPATIENT
Start: 2025-06-17 | End: 2025-06-17

## 2025-06-17 RX ORDER — IOPAMIDOL 755 MG/ML
75 INJECTION, SOLUTION INTRAVASCULAR
Status: COMPLETED | OUTPATIENT
Start: 2025-06-17 | End: 2025-06-17

## 2025-06-17 RX ORDER — ACETAMINOPHEN 650 MG/1
650 SUPPOSITORY RECTAL EVERY 6 HOURS PRN
Status: DISCONTINUED | OUTPATIENT
Start: 2025-06-17 | End: 2025-06-19 | Stop reason: SDUPTHER

## 2025-06-17 RX ORDER — HEPARIN SODIUM 1000 [USP'U]/ML
4000 INJECTION, SOLUTION INTRAVENOUS; SUBCUTANEOUS PRN
Status: DISCONTINUED | OUTPATIENT
Start: 2025-06-17 | End: 2025-06-18

## 2025-06-17 RX ORDER — ACETAMINOPHEN 325 MG/1
650 TABLET ORAL EVERY 6 HOURS PRN
Status: DISCONTINUED | OUTPATIENT
Start: 2025-06-17 | End: 2025-06-19 | Stop reason: SDUPTHER

## 2025-06-17 RX ORDER — SODIUM CHLORIDE 0.9 % (FLUSH) 0.9 %
10 SYRINGE (ML) INJECTION PRN
Status: DISCONTINUED | OUTPATIENT
Start: 2025-06-17 | End: 2025-06-19 | Stop reason: HOSPADM

## 2025-06-17 RX ORDER — AMLODIPINE BESYLATE 10 MG/1
10 TABLET ORAL DAILY
Status: DISCONTINUED | OUTPATIENT
Start: 2025-06-17 | End: 2025-06-19 | Stop reason: HOSPADM

## 2025-06-17 RX ORDER — ALFUZOSIN HYDROCHLORIDE 10 MG/1
10 TABLET, EXTENDED RELEASE ORAL DAILY
Status: ON HOLD | COMMUNITY
End: 2025-06-19 | Stop reason: HOSPADM

## 2025-06-17 RX ORDER — METOPROLOL TARTRATE 25 MG/1
25 TABLET, FILM COATED ORAL 2 TIMES DAILY
Status: DISCONTINUED | OUTPATIENT
Start: 2025-06-17 | End: 2025-06-19 | Stop reason: HOSPADM

## 2025-06-17 RX ORDER — MORPHINE SULFATE 4 MG/ML
4 INJECTION, SOLUTION INTRAMUSCULAR; INTRAVENOUS
Status: COMPLETED | OUTPATIENT
Start: 2025-06-17 | End: 2025-06-17

## 2025-06-17 RX ORDER — HEPARIN SODIUM 1000 [USP'U]/ML
2000 INJECTION, SOLUTION INTRAVENOUS; SUBCUTANEOUS PRN
Status: DISCONTINUED | OUTPATIENT
Start: 2025-06-17 | End: 2025-06-18

## 2025-06-17 RX ORDER — MAGNESIUM SULFATE 1 G/100ML
1000 INJECTION INTRAVENOUS PRN
Status: DISCONTINUED | OUTPATIENT
Start: 2025-06-17 | End: 2025-06-19 | Stop reason: HOSPADM

## 2025-06-17 RX ORDER — 0.9 % SODIUM CHLORIDE 0.9 %
80 INTRAVENOUS SOLUTION INTRAVENOUS ONCE
Status: COMPLETED | OUTPATIENT
Start: 2025-06-17 | End: 2025-06-17

## 2025-06-17 RX ORDER — DUTASTERIDE 0.5 MG/1
0.5 CAPSULE, LIQUID FILLED ORAL DAILY
COMMUNITY

## 2025-06-17 RX ORDER — ONDANSETRON 2 MG/ML
4 INJECTION INTRAMUSCULAR; INTRAVENOUS EVERY 6 HOURS PRN
Status: DISCONTINUED | OUTPATIENT
Start: 2025-06-17 | End: 2025-06-19 | Stop reason: HOSPADM

## 2025-06-17 RX ORDER — NITROGLYCERIN 0.4 MG/1
0.4 TABLET SUBLINGUAL EVERY 5 MIN PRN
Status: DISCONTINUED | OUTPATIENT
Start: 2025-06-17 | End: 2025-06-19 | Stop reason: HOSPADM

## 2025-06-17 RX ORDER — POTASSIUM CHLORIDE 1500 MG/1
40 TABLET, EXTENDED RELEASE ORAL PRN
Status: DISCONTINUED | OUTPATIENT
Start: 2025-06-17 | End: 2025-06-19 | Stop reason: HOSPADM

## 2025-06-17 RX ORDER — SODIUM CHLORIDE 0.9 % (FLUSH) 0.9 %
5-40 SYRINGE (ML) INJECTION EVERY 12 HOURS SCHEDULED
Status: DISCONTINUED | OUTPATIENT
Start: 2025-06-17 | End: 2025-06-19 | Stop reason: HOSPADM

## 2025-06-17 RX ORDER — SODIUM CHLORIDE 9 MG/ML
INJECTION, SOLUTION INTRAVENOUS CONTINUOUS
Status: DISCONTINUED | OUTPATIENT
Start: 2025-06-17 | End: 2025-06-19 | Stop reason: HOSPADM

## 2025-06-17 RX ORDER — SODIUM CHLORIDE 9 MG/ML
INJECTION, SOLUTION INTRAVENOUS PRN
Status: DISCONTINUED | OUTPATIENT
Start: 2025-06-17 | End: 2025-06-19 | Stop reason: HOSPADM

## 2025-06-17 RX ORDER — ASPIRIN 81 MG/1
81 TABLET, CHEWABLE ORAL DAILY
Status: DISCONTINUED | OUTPATIENT
Start: 2025-06-18 | End: 2025-06-19 | Stop reason: HOSPADM

## 2025-06-17 RX ORDER — HEPARIN SODIUM 1000 [USP'U]/ML
4000 INJECTION, SOLUTION INTRAVENOUS; SUBCUTANEOUS ONCE
Status: COMPLETED | OUTPATIENT
Start: 2025-06-17 | End: 2025-06-17

## 2025-06-17 RX ADMIN — HEPARIN SODIUM 7 UNITS/KG/HR: 10000 INJECTION, SOLUTION INTRAVENOUS at 13:54

## 2025-06-17 RX ADMIN — NITROGLYCERIN 20 MCG/MIN: 20 INJECTION INTRAVENOUS at 14:14

## 2025-06-17 RX ADMIN — SODIUM CHLORIDE 80 ML: 9 INJECTION, SOLUTION INTRAVENOUS at 13:59

## 2025-06-17 RX ADMIN — MORPHINE SULFATE 4 MG: 4 INJECTION, SOLUTION INTRAMUSCULAR; INTRAVENOUS at 17:04

## 2025-06-17 RX ADMIN — HEPARIN SODIUM 4000 UNITS: 1000 INJECTION INTRAVENOUS; SUBCUTANEOUS at 13:52

## 2025-06-17 RX ADMIN — ATORVASTATIN CALCIUM 80 MG: 80 TABLET, FILM COATED ORAL at 21:10

## 2025-06-17 RX ADMIN — HEPARIN SODIUM 2000 UNITS: 1000 INJECTION INTRAVENOUS; SUBCUTANEOUS at 21:27

## 2025-06-17 RX ADMIN — IOPAMIDOL 75 ML: 755 INJECTION, SOLUTION INTRAVENOUS at 13:59

## 2025-06-17 RX ADMIN — METOPROLOL TARTRATE 25 MG: 25 TABLET, FILM COATED ORAL at 21:10

## 2025-06-17 RX ADMIN — SODIUM CHLORIDE, PRESERVATIVE FREE 10 ML: 5 INJECTION INTRAVENOUS at 21:10

## 2025-06-17 RX ADMIN — FUROSEMIDE 20 MG: 10 INJECTION, SOLUTION INTRAMUSCULAR; INTRAVENOUS at 11:48

## 2025-06-17 RX ADMIN — SODIUM CHLORIDE: 0.9 INJECTION, SOLUTION INTRAVENOUS at 13:52

## 2025-06-17 RX ADMIN — SODIUM CHLORIDE, PRESERVATIVE FREE 10 ML: 5 INJECTION INTRAVENOUS at 13:59

## 2025-06-17 ASSESSMENT — ENCOUNTER SYMPTOMS
WHEEZING: 0
COUGH: 0
DIARRHEA: 0
SHORTNESS OF BREATH: 1
ABDOMINAL PAIN: 0
ALLERGIC/IMMUNOLOGIC NEGATIVE: 1
VOMITING: 0
CONSTIPATION: 0
EYES NEGATIVE: 1
NAUSEA: 0

## 2025-06-17 ASSESSMENT — PAIN SCALES - GENERAL
PAINLEVEL_OUTOF10: 7
PAINLEVEL_OUTOF10: 6
PAINLEVEL_OUTOF10: 3

## 2025-06-17 ASSESSMENT — PAIN - FUNCTIONAL ASSESSMENT: PAIN_FUNCTIONAL_ASSESSMENT: 0-10

## 2025-06-17 NOTE — H&P
[facility]  Office: [phone]  Parker Glover DO, Sriram Barber, DO, Michael Pink DO, Phillip Ricardo DO, Hailey Fan MD, Sherrill Pablo MD, Kaykay Hobbs MD, Radha Kraft MD,  Ben Trejo MD, Rebecca Bucio MD, Leatha Dominique MD,  Bruce Douglas DO, Tresa Oneal MD, Ian Hanson MD, Bandar Glover DO, Misti Pedraza MD,  Delbert Melissa DO, Rachel Aguilera MD, Haley Coe MD, Edi Parker MD,  Daniel Kang MD, Tony Ferguson MD, Kristy Blackwell MD, Marixa Luo MD, Tariq Malik MD, Prudence Urbano MD, Slick Little, DO, Coco Thomas MD, Olive Mendoza DO, Ten Lopez MD, Bruce Peters MD, Mohsin Reza, MD, Carina Dyer MD, Shirley Waterhouse, CNP,  Vanesa Pelayo, CNP, Slick Chan, CNP,  Oneyda Mayberry, LUIS, Nemo Hernández CNP, Maxine Hughes, CNP, Falguni Bains, CNP, Rosa Cole, CNP, Judith Kim, PA-C, Beatriz Teixeira, CNP, Radha Garcia, CNP,  Charlotte Lawrence, CNP, Estelle Horta, CNP, Eric Hector, PA-C, Sonia Tejada, CNP,  Cristiana Simpson, CNS, Wen Diana, CNP, Yani Byrne, CNP,   Ro Dunn, CNP         [facility]   IN-PATIENT SERVICE   [facility]    HISTORY AND PHYSICAL EXAMINATION            Date:   6/17/2025  Patient name:  Bandar Zapien  Date of admission:  6/17/2025 11:33 AM  MRN:   [id number]  Account:  [id number]  YOB: 1955  PCP:    Yareli, Pcp  Room:   [facility]/  Code Status:    Prior    Chief Complaint:     Chief Complaint   Patient presents with    Leg Swelling    Chest Pain     Bilateral leg swelling. Started Friday. Pt states chest pain started yesterday. Pt was told per [facility] to come to ed for evaluation.        History Obtained From:     patient, electronic medical record    History of Present Illness:     Bandar Zapien is a 69 y.o. Non- / non  male who presents with Leg Swelling and Chest Pain (Bilateral leg swelling. Started Friday. Pt states chest pain started yesterday. Pt

## 2025-06-17 NOTE — ED PROVIDER NOTES
[facility] EMERGENCY DEPARTMENT  eMERGENCY dEPARTMENT eNCOUnter    Pt Name: Bandar Zapien  MRN: [id number]  Birthdate 1955  Date of evaluation: 6/17/25  CHIEF COMPLAINT       Chief Complaint   Patient presents with    Leg Swelling    Chest Pain     Bilateral leg swelling. Started Friday. Pt states chest pain started yesterday. Pt was told per [facility] to come to ed for evaluation.      HISTORY OF PRESENT ILLNESS   HPI  69-year-old male presents emergency room with bilateral lower extremity swelling that has had since last Wednesday.  Patient admits to shortness of breath worse with exertion.  Patient states he has been feeling weak.  Patient has also had left anterior chest pain episodically over the past 1-1/2 months.  Patient admits to orthopnea.  REVIEW OF SYSTEMS     Constitutional: No fever  Eye: No visual changes  Ear/Nose/Mouth/Throat: No sore throat  Respiratory: Shortness of breath  Cardiovascular: Chest pain  Gastrointestinal: No nausea, no vomiting, no diarrhea  Genitourinary: No dysuria  Musculoskeletal: As above  Integumentary: No rash  Neurologic: No dizziness  Psychiatric: No anxiety, no depression  All systems otherwise negative.          PAST MEDICAL HISTORY     Past Medical History:   Diagnosis Date    Enlarged prostate     Hyperlipidemia     Hypertension     PSA elevation      SURGICAL HISTORY       Past Surgical History:   Procedure Laterality Date    CYSTOSCOPY N/A 3/10/2025    CYSTOSCOPY ,YEUNG PLACEMENT, PYLOGRAM BILATERAL performed by Sg Solomon MD at [facility] OR    LITHOTRIPSY N/A 3/12/2025    CYSTOSCOPY,   URETEROSCOPY , HOLMIUM LASER LITHOTRIPSY, BLADDER STONE REMOVAL, RETROGRADE PYELOGRAM , RIGHT STENT PLACEMENT [facility]    LITHOTRIPSY Right 4/1/2025    CYSTOSCOPY, RIGHT STENT EXCHANGE, URETEROSCOPY, LASER LITHOTRIPSY performed by Sg Solomon MD at [facility] OR    PROSTATE BIOPSY      PROSTATE BIOPSY N/A 6/2/2021    PROSTATE BIOPSY WITH ULTRASOUND,

## 2025-06-17 NOTE — PLAN OF CARE
Problem: Discharge Planning  Goal: Discharge to home or other facility with appropriate resources  6/17/2025 1846 by Paulette Young  Outcome: Progressing  6/17/2025 1846 by Paulette Young  Outcome: Progressing     Problem: Safety - Adult  Goal: Free from fall injury  6/17/2025 1846 by Paulette Young  Outcome: Progressing  6/17/2025 1846 by Paulette Young  Outcome: Progressing     Problem: ABCDS Injury Assessment  Goal: Absence of physical injury  6/17/2025 1846 by Paulette Young  Outcome: Progressing  6/17/2025 1846 by Paulette Young  Outcome: Progressing     Problem: Respiratory - Adult  Goal: Achieves optimal ventilation and oxygenation  Outcome: Progressing     Problem: Cardiovascular - Adult  Goal: Maintains optimal cardiac output and hemodynamic stability  Outcome: Progressing  Goal: Absence of cardiac dysrhythmias or at baseline  Outcome: Progressing     Problem: Skin/Tissue Integrity - Adult  Goal: Skin integrity remains intact  Outcome: Progressing     Problem: Musculoskeletal - Adult  Goal: Return mobility to safest level of function  Outcome: Progressing     Problem: Gastrointestinal - Adult  Goal: Maintains or returns to baseline bowel function  Outcome: Progressing  Goal: Maintains adequate nutritional intake  Outcome: Progressing     Problem: Metabolic/Fluid and Electrolytes - Adult  Goal: Electrolytes maintained within normal limits  Outcome: Progressing  Goal: Hemodynamic stability and optimal renal function maintained  Outcome: Progressing

## 2025-06-18 ENCOUNTER — APPOINTMENT (OUTPATIENT)
Age: 70
DRG: 322 | End: 2025-06-18
Payer: OTHER GOVERNMENT

## 2025-06-18 PROBLEM — R07.9 CHEST PAIN: Status: ACTIVE | Noted: 2025-06-18

## 2025-06-18 LAB
ALBUMIN SERPL-MCNC: 3.6 G/DL (ref 3.5–5.2)
ALBUMIN/GLOB SERPL: 1.4 {RATIO} (ref 1–2.5)
ALP SERPL-CCNC: 68 U/L (ref 40–129)
ALT SERPL-CCNC: 32 U/L (ref 10–50)
ANION GAP SERPL CALCULATED.3IONS-SCNC: 10 MMOL/L (ref 9–16)
ANTI-XA UNFRAC HEPARIN: 0.2 IU/L (ref 0.3–0.7)
ANTI-XA UNFRAC HEPARIN: 0.39 IU/L (ref 0.3–0.7)
AST SERPL-CCNC: 23 U/L (ref 10–50)
BILIRUB SERPL-MCNC: 1.1 MG/DL (ref 0–1.2)
BUN SERPL-MCNC: 15 MG/DL (ref 8–23)
CALCIUM SERPL-MCNC: 8.5 MG/DL (ref 8.8–10.2)
CHLORIDE SERPL-SCNC: 105 MMOL/L (ref 98–107)
CHOLEST SERPL-MCNC: 114 MG/DL (ref 0–199)
CHOLESTEROL/HDL RATIO: 3.3
CO2 SERPL-SCNC: 25 MMOL/L (ref 20–31)
CREAT SERPL-MCNC: 1 MG/DL (ref 0.7–1.2)
ECHO AO ROOT DIAM: 3.1 CM
ECHO AO ROOT INDEX: 1.26 CM/M2
ECHO AV MEAN GRADIENT: 3 MMHG
ECHO AV MEAN VELOCITY: 0.9 M/S
ECHO AV PEAK GRADIENT: 6 MMHG
ECHO AV PEAK VELOCITY: 1.2 M/S
ECHO AV VELOCITY RATIO: 0.83
ECHO AV VTI: 26.1 CM
ECHO BSA: 2.57 M2
ECHO BSA: 2.57 M2
ECHO EST RA PRESSURE: 3 MMHG
ECHO LA DIAMETER INDEX: 1.26 CM/M2
ECHO LA DIAMETER: 3.1 CM
ECHO LA TO AORTIC ROOT RATIO: 1
ECHO LV E' LATERAL VELOCITY: 8.16 CM/S
ECHO LV E' SEPTAL VELOCITY: 6.31 CM/S
ECHO LV EF PHYSICIAN: 60 %
ECHO LV FRACTIONAL SHORTENING: 35 % (ref 28–44)
ECHO LV INTERNAL DIMENSION DIASTOLE INDEX: 2.06 CM/M2
ECHO LV INTERNAL DIMENSION DIASTOLIC: 5.1 CM (ref 4.2–5.9)
ECHO LV INTERNAL DIMENSION SYSTOLIC INDEX: 1.34 CM/M2
ECHO LV INTERNAL DIMENSION SYSTOLIC: 3.3 CM
ECHO LV IVSD: 1 CM (ref 0.6–1)
ECHO LV MASS 2D: 188 G (ref 88–224)
ECHO LV MASS INDEX 2D: 76.1 G/M2 (ref 49–115)
ECHO LV POSTERIOR WALL DIASTOLIC: 1 CM (ref 0.6–1)
ECHO LV RELATIVE WALL THICKNESS RATIO: 0.39
ECHO LVOT PEAK GRADIENT: 4 MMHG
ECHO LVOT PEAK VELOCITY: 1 M/S
ECHO MV A VELOCITY: 1.02 M/S
ECHO MV E DECELERATION TIME (DT): 313 MS
ECHO MV E VELOCITY: 0.83 M/S
ECHO MV E/A RATIO: 0.81
ECHO MV E/E' LATERAL: 10.17
ECHO MV E/E' RATIO (AVERAGED): 11.66
ECHO MV E/E' SEPTAL: 13.15
ERYTHROCYTE [DISTWIDTH] IN BLOOD BY AUTOMATED COUNT: 12.7 % (ref 11.8–14.4)
GFR, ESTIMATED: 83 ML/MIN/1.73M2
GLUCOSE SERPL-MCNC: 121 MG/DL (ref 82–115)
HCT VFR BLD AUTO: 38.6 % (ref 40.7–50.3)
HDLC SERPL-MCNC: 35 MG/DL
HGB BLD-MCNC: 13.1 G/DL (ref 13–17)
INR PPP: 1
LDLC SERPL CALC-MCNC: 46 MG/DL (ref 0–100)
MCH RBC QN AUTO: 30.3 PG (ref 25.2–33.5)
MCHC RBC AUTO-ENTMCNC: 33.9 G/DL (ref 28.4–34.8)
MCV RBC AUTO: 89.4 FL (ref 82.6–102.9)
MYOGLOBIN SERPL-MCNC: 51 NG/ML (ref 28–72)
NRBC BLD-RTO: 0 PER 100 WBC
PLATELET # BLD AUTO: 165 K/UL (ref 138–453)
PMV BLD AUTO: 10 FL (ref 8.1–13.5)
POTASSIUM SERPL-SCNC: 3.8 MMOL/L (ref 3.7–5.3)
PROT SERPL-MCNC: 6.2 G/DL (ref 6.6–8.7)
PROTHROMBIN TIME: 13.8 SEC (ref 11.5–14.2)
RBC # BLD AUTO: 4.32 M/UL (ref 4.21–5.77)
SODIUM SERPL-SCNC: 140 MMOL/L (ref 136–145)
TRIGL SERPL-MCNC: 165 MG/DL
TROPONIN I SERPL HS-MCNC: 87 NG/L (ref 0–22)
VLDLC SERPL CALC-MCNC: 33 MG/DL (ref 1–30)
WBC OTHER # BLD: 7.6 K/UL (ref 3.5–11.3)

## 2025-06-18 PROCEDURE — 6360000002 HC RX W HCPCS

## 2025-06-18 PROCEDURE — 83874 ASSAY OF MYOGLOBIN: CPT

## 2025-06-18 PROCEDURE — 87506 IADNA-DNA/RNA PROBE TQ 6-11: CPT

## 2025-06-18 PROCEDURE — C9600 PERC DRUG-EL COR STENT SING: HCPCS | Performed by: INTERNAL MEDICINE

## 2025-06-18 PROCEDURE — 94761 N-INVAS EAR/PLS OXIMETRY MLT: CPT

## 2025-06-18 PROCEDURE — 99232 SBSQ HOSP IP/OBS MODERATE 35: CPT | Performed by: INTERNAL MEDICINE

## 2025-06-18 PROCEDURE — 92920 PRQ TRLUML C ANGIOP 1ART&/BR: CPT | Performed by: INTERNAL MEDICINE

## 2025-06-18 PROCEDURE — 92928 PRQ TCAT PLMT NTRAC ST 1 LES: CPT | Performed by: INTERNAL MEDICINE

## 2025-06-18 PROCEDURE — C8929 TTE W OR WO FOL WCON,DOPPLER: HCPCS

## 2025-06-18 PROCEDURE — 93306 TTE W/DOPPLER COMPLETE: CPT | Performed by: INTERNAL MEDICINE

## 2025-06-18 PROCEDURE — B2151ZZ FLUOROSCOPY OF LEFT HEART USING LOW OSMOLAR CONTRAST: ICD-10-PCS | Performed by: INTERNAL MEDICINE

## 2025-06-18 PROCEDURE — C1887 CATHETER, GUIDING: HCPCS | Performed by: INTERNAL MEDICINE

## 2025-06-18 PROCEDURE — 2580000003 HC RX 258: Performed by: EMERGENCY MEDICINE

## 2025-06-18 PROCEDURE — C1725 CATH, TRANSLUMIN NON-LASER: HCPCS | Performed by: INTERNAL MEDICINE

## 2025-06-18 PROCEDURE — 93458 L HRT ARTERY/VENTRICLE ANGIO: CPT | Performed by: INTERNAL MEDICINE

## 2025-06-18 PROCEDURE — 2000000000 HC ICU R&B

## 2025-06-18 PROCEDURE — 99223 1ST HOSP IP/OBS HIGH 75: CPT | Performed by: SURGERY

## 2025-06-18 PROCEDURE — 2500000003 HC RX 250 WO HCPCS: Performed by: INTERNAL MEDICINE

## 2025-06-18 PROCEDURE — 80061 LIPID PANEL: CPT

## 2025-06-18 PROCEDURE — 36415 COLL VENOUS BLD VENIPUNCTURE: CPT

## 2025-06-18 PROCEDURE — 2580000003 HC RX 258: Performed by: INTERNAL MEDICINE

## 2025-06-18 PROCEDURE — C1894 INTRO/SHEATH, NON-LASER: HCPCS | Performed by: INTERNAL MEDICINE

## 2025-06-18 PROCEDURE — 6370000000 HC RX 637 (ALT 250 FOR IP)

## 2025-06-18 PROCEDURE — 6360000004 HC RX CONTRAST MEDICATION

## 2025-06-18 PROCEDURE — 6370000000 HC RX 637 (ALT 250 FOR IP): Performed by: INTERNAL MEDICINE

## 2025-06-18 PROCEDURE — 027034Z DILATION OF CORONARY ARTERY, ONE ARTERY WITH DRUG-ELUTING INTRALUMINAL DEVICE, PERCUTANEOUS APPROACH: ICD-10-PCS | Performed by: INTERNAL MEDICINE

## 2025-06-18 PROCEDURE — 2709999900 HC NON-CHARGEABLE SUPPLY: Performed by: INTERNAL MEDICINE

## 2025-06-18 PROCEDURE — 6360000002 HC RX W HCPCS: Performed by: INTERNAL MEDICINE

## 2025-06-18 PROCEDURE — 85610 PROTHROMBIN TIME: CPT

## 2025-06-18 PROCEDURE — 4A023N7 MEASUREMENT OF CARDIAC SAMPLING AND PRESSURE, LEFT HEART, PERCUTANEOUS APPROACH: ICD-10-PCS | Performed by: INTERNAL MEDICINE

## 2025-06-18 PROCEDURE — 99152 MOD SED SAME PHYS/QHP 5/>YRS: CPT | Performed by: INTERNAL MEDICINE

## 2025-06-18 PROCEDURE — 93463 DRUG ADMIN & HEMODYNMIC MEAS: CPT | Performed by: INTERNAL MEDICINE

## 2025-06-18 PROCEDURE — C1874 STENT, COATED/COV W/DEL SYS: HCPCS | Performed by: INTERNAL MEDICINE

## 2025-06-18 PROCEDURE — 92921 HC PRQ CARDIAC ANGIO ADDL ART: CPT | Performed by: INTERNAL MEDICINE

## 2025-06-18 PROCEDURE — B2111ZZ FLUOROSCOPY OF MULTIPLE CORONARY ARTERIES USING LOW OSMOLAR CONTRAST: ICD-10-PCS | Performed by: INTERNAL MEDICINE

## 2025-06-18 PROCEDURE — 80053 COMPREHEN METABOLIC PANEL: CPT

## 2025-06-18 PROCEDURE — 84484 ASSAY OF TROPONIN QUANT: CPT

## 2025-06-18 PROCEDURE — 2500000003 HC RX 250 WO HCPCS

## 2025-06-18 PROCEDURE — 85520 HEPARIN ASSAY: CPT

## 2025-06-18 PROCEDURE — 93005 ELECTROCARDIOGRAM TRACING: CPT | Performed by: INTERNAL MEDICINE

## 2025-06-18 PROCEDURE — 99153 MOD SED SAME PHYS/QHP EA: CPT | Performed by: INTERNAL MEDICINE

## 2025-06-18 PROCEDURE — 6360000004 HC RX CONTRAST MEDICATION: Performed by: INTERNAL MEDICINE

## 2025-06-18 PROCEDURE — 85027 COMPLETE CBC AUTOMATED: CPT

## 2025-06-18 PROCEDURE — C1769 GUIDE WIRE: HCPCS | Performed by: INTERNAL MEDICINE

## 2025-06-18 DEVICE — STENT CORONARY ONYX FRONTIER RX 3.5X18 MM ZOTAROLIMUS ELUT: Type: IMPLANTABLE DEVICE | Status: FUNCTIONAL

## 2025-06-18 RX ORDER — TICAGRELOR 90 MG/1
90 TABLET, FILM COATED ORAL 2 TIMES DAILY
Status: DISCONTINUED | OUTPATIENT
Start: 2025-06-18 | End: 2025-06-19 | Stop reason: HOSPADM

## 2025-06-18 RX ORDER — ACETAMINOPHEN 325 MG/1
650 TABLET ORAL EVERY 4 HOURS PRN
Status: DISCONTINUED | OUTPATIENT
Start: 2025-06-18 | End: 2025-06-19 | Stop reason: HOSPADM

## 2025-06-18 RX ORDER — MIDAZOLAM HYDROCHLORIDE 1 MG/ML
INJECTION, SOLUTION INTRAMUSCULAR; INTRAVENOUS PRN
Status: DISCONTINUED | OUTPATIENT
Start: 2025-06-18 | End: 2025-06-18 | Stop reason: HOSPADM

## 2025-06-18 RX ORDER — FENTANYL CITRATE 50 UG/ML
INJECTION, SOLUTION INTRAMUSCULAR; INTRAVENOUS PRN
Status: DISCONTINUED | OUTPATIENT
Start: 2025-06-18 | End: 2025-06-18 | Stop reason: HOSPADM

## 2025-06-18 RX ORDER — PANTOPRAZOLE SODIUM 40 MG/1
40 TABLET, DELAYED RELEASE ORAL
Status: DISCONTINUED | OUTPATIENT
Start: 2025-06-18 | End: 2025-06-19 | Stop reason: HOSPADM

## 2025-06-18 RX ORDER — SODIUM CHLORIDE 0.9 % (FLUSH) 0.9 %
5-40 SYRINGE (ML) INJECTION PRN
Status: DISCONTINUED | OUTPATIENT
Start: 2025-06-18 | End: 2025-06-19 | Stop reason: HOSPADM

## 2025-06-18 RX ORDER — BIVALIRUDIN 250 MG/5ML
INJECTION, POWDER, LYOPHILIZED, FOR SOLUTION INTRAVENOUS PRN
Status: DISCONTINUED | OUTPATIENT
Start: 2025-06-18 | End: 2025-06-18 | Stop reason: HOSPADM

## 2025-06-18 RX ORDER — SODIUM CHLORIDE 0.9 % (FLUSH) 0.9 %
5-40 SYRINGE (ML) INJECTION EVERY 12 HOURS SCHEDULED
Status: DISCONTINUED | OUTPATIENT
Start: 2025-06-18 | End: 2025-06-19 | Stop reason: HOSPADM

## 2025-06-18 RX ORDER — IOPAMIDOL 755 MG/ML
INJECTION, SOLUTION INTRAVASCULAR PRN
Status: DISCONTINUED | OUTPATIENT
Start: 2025-06-18 | End: 2025-06-18 | Stop reason: HOSPADM

## 2025-06-18 RX ORDER — SODIUM CHLORIDE 9 MG/ML
INJECTION, SOLUTION INTRAVENOUS CONTINUOUS
Status: DISCONTINUED | OUTPATIENT
Start: 2025-06-18 | End: 2025-06-19 | Stop reason: HOSPADM

## 2025-06-18 RX ORDER — NITROGLYCERIN 20 MG/100ML
INJECTION INTRAVENOUS PRN
Status: DISCONTINUED | OUTPATIENT
Start: 2025-06-18 | End: 2025-06-18 | Stop reason: HOSPADM

## 2025-06-18 RX ORDER — SODIUM CHLORIDE 9 MG/ML
INJECTION, SOLUTION INTRAVENOUS PRN
Status: DISCONTINUED | OUTPATIENT
Start: 2025-06-18 | End: 2025-06-19 | Stop reason: HOSPADM

## 2025-06-18 RX ADMIN — TICAGRELOR 90 MG: 90 TABLET ORAL at 20:37

## 2025-06-18 RX ADMIN — HEPARIN SODIUM 11 UNITS/KG/HR: 10000 INJECTION, SOLUTION INTRAVENOUS at 08:58

## 2025-06-18 RX ADMIN — SULFUR HEXAFLUORIDE 2 ML: KIT at 10:47

## 2025-06-18 RX ADMIN — SODIUM CHLORIDE: 0.9 INJECTION, SOLUTION INTRAVENOUS at 09:00

## 2025-06-18 RX ADMIN — SODIUM CHLORIDE, PRESERVATIVE FREE 10 ML: 5 INJECTION INTRAVENOUS at 20:38

## 2025-06-18 RX ADMIN — AMLODIPINE BESYLATE 10 MG: 10 TABLET ORAL at 08:54

## 2025-06-18 RX ADMIN — ASPIRIN 81 MG CHEWABLE TABLET 81 MG: 81 TABLET CHEWABLE at 08:54

## 2025-06-18 RX ADMIN — METOPROLOL TARTRATE 25 MG: 25 TABLET, FILM COATED ORAL at 08:54

## 2025-06-18 RX ADMIN — SODIUM CHLORIDE, PRESERVATIVE FREE 10 ML: 5 INJECTION INTRAVENOUS at 08:55

## 2025-06-18 RX ADMIN — HEPARIN SODIUM 2000 UNITS: 1000 INJECTION INTRAVENOUS; SUBCUTANEOUS at 02:16

## 2025-06-18 RX ADMIN — METOPROLOL TARTRATE 25 MG: 25 TABLET, FILM COATED ORAL at 20:37

## 2025-06-18 RX ADMIN — ATORVASTATIN CALCIUM 80 MG: 80 TABLET, FILM COATED ORAL at 20:37

## 2025-06-18 RX ADMIN — PANTOPRAZOLE SODIUM 40 MG: 40 TABLET, DELAYED RELEASE ORAL at 09:53

## 2025-06-18 RX ADMIN — TAMSULOSIN HYDROCHLORIDE 0.4 MG: 0.4 CAPSULE ORAL at 08:54

## 2025-06-18 RX ADMIN — SODIUM CHLORIDE: 0.9 INJECTION, SOLUTION INTRAVENOUS at 15:57

## 2025-06-18 NOTE — PROCEDURES
[facility]        Date:   6/18/2025  Patient name: Bandar Zapien  Date of admission:  6/17/2025 11:33 AM  MRN:   [id number]  YOB: 1955    CARDIAC CATHETERIZATION    Operators:  Primary: Miriam Couch MD.  Assistant:     Indications for cath: NSTEMI    Procedure performed: Cardiac cath.    Access: Right Radial artery      Procedure: After informed consent was obtained with explanation of the risks and benefits, patient was brought to the cath lab. The right wrist was prepped and draped in sterile fashion. 1% lidocaine was used for local block. The Radial artery was cannulated with 6  Fr sheath with brisk arterial blood return. The side port was frequently flushed and aspirated with normal saline.    EBL is 15 mL    Dominance is right    Findings:    Left main: Normal with 0% stenosis.    LAD: 90% mid stenosis first diagonal bifurcation, length is 14 mm, DAVIS-3 flow, underwent PCI with SUMEET using 3.5 x 18 mm [place] stent with 0% residual stenosis and DAVIS-3 flow  1st diagonal: 90% ostial stenosis after stent deployment, length is 8 mm, DAVIS-3 flow, underwent PTCA only using 2.5 x 12 mm balloon with 10% residual stenosis and DAVIS-3 flow    LCX: Luminal irregularities of 20 to 30%    RCA: Luminal irregularities of 20 to 30%  R PLV: 50% proximal stenosis    The LV gram was performed in the ALMARAZ 30 position.   LVEF: 55%. LV Wall Motion: Normal    Conclusions:  Successful PCI with SUMEET to mid LAD  Successful PTCA only to ostial diagonal  Otherwise nonobstructive small branch vessel disease  Overall preserved LV function    Recommendation:  Routine post PCI orders  Medical treatments.  Risk factors modifications.        Electronically signed by Miriam Couch MD on 6/18/2025 at 3:19 PM      [facility]  [phone]

## 2025-06-18 NOTE — CONSULTS
[facility]    Consult                        Today's Date: 6/18/2025  Patient Name: Bandar Zapien  Date of admission: 6/17/2025 11:33 AM  Patient's age: 69 y.o., 1955  Admission Dx: Edema [R60.9]  NSTEMI (non-ST elevated myocardial infarction) (HCC) [I21.4]  Chest pain, unspecified type [R07.9]    Reason for Consult: NSTEMI    Requesting Physician: Hailey Fan MD    CHIEF COMPLAINT:  chest pain     History Obtained From:  patient, electronic medical record    HISTORY OF PRESENT ILLNESS:      Bandar Zapien is a 69 y.o. Non- / non  male who presents with Leg Swelling and Chest Pain (Bilateral leg swelling. Started Friday. Pt states chest pain started yesterday. [facility] to come to [facility] for evaluation. )   and is admitted to the hospital for the management of NSTEMI (non-ST elevated myocardial infarction) (HCC).     This is a very pleasant 69-year-old gentleman who presents to the emergency department with complaints of chest pain and bilateral lower leg swelling.  Patient states that his chest pain has been going on and off for a few days.  He also states that his bilateral lower leg swelling has been ongoing for a few days as well.  Patient states that he has a history of hypertension and hyperlipidemia.  Patient states that his chest pain is more of a dull continuous ache, and the pain does not radiate to the arm or the jaw.  Patient also does endorse having numbness and tingling in his bilateral lower extremities but he states that he has had this for a long time.  Patient currently denies any chills, fever, cough, palpitations, abdominal pain, constipation, diarrhea, nausea, vomiting, difficulties with urination, dizziness or headaches.    Past Medical History:   has a past medical history of Enlarged prostate, Hyperlipidemia, Hypertension, and PSA elevation.    Past Surgical History:   has a past surgical history that includes Prostate biopsy; Prostate

## 2025-06-18 NOTE — CARE COORDINATION
Case Management Assessment  Initial Evaluation    Date/Time of Evaluation: 6/18/2025 1:28 PM  Assessment Completed by: Ofelia Yee    If patient is discharged prior to next notation, then this note serves as note for discharge by case management.    Patient Name: Bandar Zapien                   YOB: 1955  Diagnosis: Edema [R60.9]  NSTEMI (non-ST elevated myocardial infarction) (HCC) [I21.4]  Chest pain, unspecified type [R07.9]                   Date / Time: 6/17/2025 11:33 AM    Patient Admission Status: Inpatient   Readmission Risk (Low < 19, Mod (19-27), High > 27): Readmission Risk Score: 10.5    Current PCP: Jaun Madrid APRN - NP  PCP verified by CM? Yes (PCP is Dr Luis Alberto august)    Chart Reviewed: Yes      History Provided by: Patient  Patient Orientation: Alert and Oriented    Patient Cognition: Alert    Hospitalization in the last 30 days (Readmission):  No    If yes, Readmission Assessment in CM Navigator will be completed.    Advance Directives:      Code Status: Full Code   Patient's Primary Decision Maker is: Legal Next of Kin    Primary Decision Maker: Jeremy Zapien - Child - [phone]    Discharge Planning:    Patient lives with: Spouse/Significant Other Type of Home: House  Primary Care Giver: Self  Patient Support Systems include: Children   Current Financial resources: Financial Counseling  Current community resources:    Current services prior to admission: C-pap            Current DME:              Type of Home Care services:  Aide Services (for his wife)    ADLS  Prior functional level: Independent in ADLs/IADLs  Current functional level: Independent in ADLs/IADLs    PT AM-PAC:   /24  OT AM-PAC:   /24    Family can provide assistance at DC: Yes  Would you like Case Management to discuss the discharge plan with any other family members/significant others, and if so, who? No  Plans to Return to Present Housing: Other (see comment) (Pt relates he can not return home due

## 2025-06-18 NOTE — PLAN OF CARE
Problem: Discharge Planning  Goal: Discharge to home or other facility with appropriate resources  Outcome: Progressing     Problem: Safety - Adult  Goal: Free from fall injury  Outcome: Progressing     Problem: ABCDS Injury Assessment  Goal: Absence of physical injury  Outcome: Progressing     Problem: Respiratory - Adult  Goal: Achieves optimal ventilation and oxygenation  Outcome: Progressing     Problem: Cardiovascular - Adult  Goal: Maintains optimal cardiac output and hemodynamic stability  Outcome: Progressing  Goal: Absence of cardiac dysrhythmias or at baseline  Outcome: Progressing     Problem: Skin/Tissue Integrity - Adult  Goal: Skin integrity remains intact  Description: 1.  Monitor for areas of redness and/or skin breakdown  2.  Assess vascular access sites hourly  3.  Every 4-6 hours minimum:  Change oxygen saturation probe site  4.  Every 4-6 hours:  If on nasal continuous positive airway pressure, respiratory therapy assess nares and determine need for appliance change or resting period  Outcome: Progressing     Problem: Musculoskeletal - Adult  Goal: Return mobility to safest level of function  Outcome: Progressing     Problem: Gastrointestinal - Adult  Goal: Maintains or returns to baseline bowel function  Outcome: Progressing  Goal: Maintains adequate nutritional intake  Outcome: Progressing     Problem: Metabolic/Fluid and Electrolytes - Adult  Goal: Electrolytes maintained within normal limits  Outcome: Progressing  Goal: Hemodynamic stability and optimal renal function maintained  Outcome: Progressing     Problem: Skin/Tissue Integrity  Goal: Skin integrity remains intact  Description: 1.  Monitor for areas of redness and/or skin breakdown  2.  Assess vascular access sites hourly  3.  Every 4-6 hours minimum:  Change oxygen saturation probe site  4.  Every 4-6 hours:  If on nasal continuous positive airway pressure, respiratory therapy assess nares and determine need for appliance change or

## 2025-06-18 NOTE — PLAN OF CARE
Problem: Discharge Planning  Goal: Discharge to home or other facility with appropriate resources  6/17/2025 2030 by Yonathan Sahu RN  Outcome: Progressing  6/17/2025 1846 by Paulette Young  Outcome: Progressing  6/17/2025 1846 by Paultete Young  Outcome: Progressing     Problem: Safety - Adult  Goal: Free from fall injury  6/17/2025 2030 by Yonathan Sahu RN  Outcome: Progressing  6/17/2025 1846 by Paulette Young  Outcome: Progressing  6/17/2025 1846 by Paulette Young  Outcome: Progressing     Problem: ABCDS Injury Assessment  Goal: Absence of physical injury  6/17/2025 2030 by Yonathan Sahu RN  Outcome: Progressing  6/17/2025 1846 by Paulette Young  Outcome: Progressing  6/17/2025 1846 by Paulette Young  Outcome: Progressing     Problem: Respiratory - Adult  Goal: Achieves optimal ventilation and oxygenation  6/17/2025 2030 by Yonathan Sahu RN  Outcome: Progressing  6/17/2025 1846 by Paulette Young  Outcome: Progressing     Problem: Cardiovascular - Adult  Goal: Maintains optimal cardiac output and hemodynamic stability  6/17/2025 2030 by Yonathan Sahu RN  Outcome: Progressing  6/17/2025 1846 by Paulette Young  Outcome: Progressing  Goal: Absence of cardiac dysrhythmias or at baseline  6/17/2025 2030 by Yonathan Sahu RN  Outcome: Progressing  6/17/2025 1846 by Paulette Young  Outcome: Progressing     Problem: Skin/Tissue Integrity - Adult  Goal: Skin integrity remains intact  6/17/2025 2030 by Yonathan Sahu RN  Outcome: Progressing  6/17/2025 1846 by Paulette Young  Outcome: Progressing     Problem: Musculoskeletal - Adult  Goal: Return mobility to safest level of function  6/17/2025 2030 by Yonathan Sahu RN  Outcome: Progressing  6/17/2025 1846 by Paulette Young  Outcome: Progressing     Problem: Gastrointestinal - Adult  Goal: Maintains or returns to baseline bowel function  6/17/2025 2030 by Yonathan Sahu RN  Outcome: Progressing  6/17/2025 1846 by Hector

## 2025-06-19 VITALS
DIASTOLIC BLOOD PRESSURE: 62 MMHG | OXYGEN SATURATION: 93 % | TEMPERATURE: 98.7 F | SYSTOLIC BLOOD PRESSURE: 125 MMHG | BODY MASS INDEX: 41.44 KG/M2 | WEIGHT: 296 LBS | HEART RATE: 75 BPM | RESPIRATION RATE: 19 BRPM | HEIGHT: 71 IN

## 2025-06-19 LAB
ALBUMIN SERPL-MCNC: 3.6 G/DL (ref 3.5–5.2)
ALBUMIN/GLOB SERPL: 1.3 {RATIO} (ref 1–2.5)
ALP SERPL-CCNC: 72 U/L (ref 40–129)
ALT SERPL-CCNC: 25 U/L (ref 10–50)
ANION GAP SERPL CALCULATED.3IONS-SCNC: 10 MMOL/L (ref 9–16)
AST SERPL-CCNC: 20 U/L (ref 10–50)
BILIRUB SERPL-MCNC: 1.4 MG/DL (ref 0–1.2)
BUN SERPL-MCNC: 12 MG/DL (ref 8–23)
CALCIUM SERPL-MCNC: 8.7 MG/DL (ref 8.8–10.2)
CAMPYLOBACTER DNA SPEC NAA+PROBE: NORMAL
CHLORIDE SERPL-SCNC: 106 MMOL/L (ref 98–107)
CO2 SERPL-SCNC: 23 MMOL/L (ref 20–31)
CREAT SERPL-MCNC: 1 MG/DL (ref 0.7–1.2)
ERYTHROCYTE [DISTWIDTH] IN BLOOD BY AUTOMATED COUNT: 12.5 % (ref 11.8–14.4)
ETEC ELTA+ESTB GENES STL QL NAA+PROBE: NORMAL
GFR, ESTIMATED: 84 ML/MIN/1.73M2
GLUCOSE SERPL-MCNC: 107 MG/DL (ref 82–115)
HCT VFR BLD AUTO: 39.1 % (ref 40.7–50.3)
HGB BLD-MCNC: 13.2 G/DL (ref 13–17)
MAGNESIUM SERPL-MCNC: 2 MG/DL (ref 1.6–2.4)
MCH RBC QN AUTO: 30.4 PG (ref 25.2–33.5)
MCHC RBC AUTO-ENTMCNC: 33.8 G/DL (ref 28.4–34.8)
MCV RBC AUTO: 90.1 FL (ref 82.6–102.9)
MYOGLOBIN SERPL-MCNC: 58 NG/ML (ref 28–72)
NRBC BLD-RTO: 0 PER 100 WBC
P SHIGELLOIDES DNA STL QL NAA+PROBE: NORMAL
PLATELET # BLD AUTO: 166 K/UL (ref 138–453)
PMV BLD AUTO: 10.5 FL (ref 8.1–13.5)
POTASSIUM SERPL-SCNC: 3.5 MMOL/L (ref 3.7–5.3)
PROT SERPL-MCNC: 6.5 G/DL (ref 6.6–8.7)
RBC # BLD AUTO: 4.34 M/UL (ref 4.21–5.77)
SALMONELLA DNA SPEC QL NAA+PROBE: NORMAL
SHIGA TOXIN STX GENE SPEC NAA+PROBE: NORMAL
SHIGELLA DNA SPEC QL NAA+PROBE: NORMAL
SODIUM SERPL-SCNC: 138 MMOL/L (ref 136–145)
SPECIMEN DESCRIPTION: NORMAL
TROPONIN I SERPL HS-MCNC: 100 NG/L (ref 0–22)
V CHOL+PARA RFBL+TRKH+TNAA STL QL NAA+PR: NORMAL
WBC OTHER # BLD: 7.1 K/UL (ref 3.5–11.3)
Y ENTERO RECN STL QL NAA+PROBE: NORMAL

## 2025-06-19 PROCEDURE — 2500000003 HC RX 250 WO HCPCS: Performed by: INTERNAL MEDICINE

## 2025-06-19 PROCEDURE — 80053 COMPREHEN METABOLIC PANEL: CPT

## 2025-06-19 PROCEDURE — 6370000000 HC RX 637 (ALT 250 FOR IP): Performed by: INTERNAL MEDICINE

## 2025-06-19 PROCEDURE — 36415 COLL VENOUS BLD VENIPUNCTURE: CPT

## 2025-06-19 PROCEDURE — 94761 N-INVAS EAR/PLS OXIMETRY MLT: CPT

## 2025-06-19 PROCEDURE — 99232 SBSQ HOSP IP/OBS MODERATE 35: CPT | Performed by: INTERNAL MEDICINE

## 2025-06-19 PROCEDURE — 83735 ASSAY OF MAGNESIUM: CPT

## 2025-06-19 PROCEDURE — 99233 SBSQ HOSP IP/OBS HIGH 50: CPT | Performed by: NURSE PRACTITIONER

## 2025-06-19 PROCEDURE — 85027 COMPLETE CBC AUTOMATED: CPT

## 2025-06-19 RX ORDER — TICAGRELOR 90 MG/1
90 TABLET, FILM COATED ORAL 2 TIMES DAILY
Qty: 60 TABLET | Refills: 12 | Status: SHIPPED | OUTPATIENT
Start: 2025-06-19 | End: 2025-06-19

## 2025-06-19 RX ORDER — TICAGRELOR 90 MG/1
90 TABLET, FILM COATED ORAL 2 TIMES DAILY
Qty: 60 TABLET | Refills: 1 | Status: SHIPPED | OUTPATIENT
Start: 2025-06-19

## 2025-06-19 RX ORDER — ASPIRIN 81 MG/1
81 TABLET, CHEWABLE ORAL DAILY
Qty: 30 TABLET | Refills: 3 | Status: SHIPPED | OUTPATIENT
Start: 2025-06-19 | End: 2025-06-19

## 2025-06-19 RX ORDER — NITROGLYCERIN 0.4 MG/1
0.4 TABLET SUBLINGUAL EVERY 5 MIN PRN
Qty: 25 TABLET | Refills: 1 | Status: SHIPPED | OUTPATIENT
Start: 2025-06-19

## 2025-06-19 RX ORDER — ATORVASTATIN CALCIUM 80 MG/1
80 TABLET, FILM COATED ORAL NIGHTLY
Qty: 30 TABLET | Refills: 1 | Status: SHIPPED | OUTPATIENT
Start: 2025-06-19

## 2025-06-19 RX ORDER — ASPIRIN 81 MG/1
81 TABLET, CHEWABLE ORAL DAILY
Qty: 30 TABLET | Refills: 1 | Status: SHIPPED | OUTPATIENT
Start: 2025-06-19

## 2025-06-19 RX ORDER — NITROGLYCERIN 0.4 MG/1
0.4 TABLET SUBLINGUAL EVERY 5 MIN PRN
Qty: 25 TABLET | Refills: 3 | Status: SHIPPED | OUTPATIENT
Start: 2025-06-19 | End: 2025-06-19

## 2025-06-19 RX ORDER — METOPROLOL TARTRATE 25 MG/1
25 TABLET, FILM COATED ORAL 2 TIMES DAILY
Qty: 60 TABLET | Refills: 1 | Status: SHIPPED | OUTPATIENT
Start: 2025-06-19

## 2025-06-19 RX ORDER — ATORVASTATIN CALCIUM 80 MG/1
80 TABLET, FILM COATED ORAL NIGHTLY
Qty: 30 TABLET | Refills: 3 | Status: SHIPPED | OUTPATIENT
Start: 2025-06-19 | End: 2025-06-19

## 2025-06-19 RX ADMIN — PANTOPRAZOLE SODIUM 40 MG: 40 TABLET, DELAYED RELEASE ORAL at 09:33

## 2025-06-19 RX ADMIN — AMLODIPINE BESYLATE 10 MG: 10 TABLET ORAL at 09:32

## 2025-06-19 RX ADMIN — SODIUM CHLORIDE, PRESERVATIVE FREE 10 ML: 5 INJECTION INTRAVENOUS at 09:31

## 2025-06-19 RX ADMIN — METOPROLOL TARTRATE 25 MG: 25 TABLET, FILM COATED ORAL at 09:29

## 2025-06-19 RX ADMIN — SODIUM CHLORIDE, PRESERVATIVE FREE 10 ML: 5 INJECTION INTRAVENOUS at 09:30

## 2025-06-19 RX ADMIN — TAMSULOSIN HYDROCHLORIDE 0.4 MG: 0.4 CAPSULE ORAL at 09:34

## 2025-06-19 RX ADMIN — ASPIRIN 81 MG CHEWABLE TABLET 81 MG: 81 TABLET CHEWABLE at 09:35

## 2025-06-19 RX ADMIN — POTASSIUM CHLORIDE 40 MEQ: 1500 TABLET, EXTENDED RELEASE ORAL at 09:32

## 2025-06-19 RX ADMIN — TICAGRELOR 90 MG: 90 TABLET ORAL at 09:35

## 2025-06-19 NOTE — CARE COORDINATION
Social work; received call from sister who states she has CP had has arranged aide care for herself for this Sunday and pt should not worry. She is working on getting her  to work on full time aides 2x daily due to pt not being able to lift at discharge. Oneyda delgado
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
## 2025-06-19 NOTE — DISCHARGE SUMMARY
Discharge Note:      All discharge instructions given at this time as well as all patient belongings returned to patient. Pt denies any further questions regarding discharge at this time. Pt given discharge packet with unit letter, discharge instructions/restrictions and medication handouts regarding all discharge medications and side effects. Pt denies any further issues at this time.    Pt wheeled out to front discharge doors at this time.     Pt left premises without any issues in private vehicle at this time.     
patient examination, evaluation, counseling as well as medication reconciliation, prescriptions for required medications, discharge plan and follow up.    Electronically signed by   Hailey Fan MD  6/19/2025  4:00 PM      Thank you Jaun Lara, APRN - NP for the opportunity to be involved in this patient's care.

## 2025-06-19 NOTE — PLAN OF CARE
Problem: Discharge Planning  Goal: Discharge to home or other facility with appropriate resources  6/18/2025 2023 by Yonathan Sahu RN  Outcome: Progressing  6/18/2025 1235 by Paulette Young  Outcome: Progressing     Problem: Safety - Adult  Goal: Free from fall injury  6/18/2025 2023 by Yonathan Sahu RN  Outcome: Progressing  6/18/2025 1235 by Paulette Young  Outcome: Progressing     Problem: ABCDS Injury Assessment  Goal: Absence of physical injury  6/18/2025 2023 by Yonathan Sahu RN  Outcome: Progressing  6/18/2025 1235 by Paulette Young  Outcome: Progressing     Problem: Respiratory - Adult  Goal: Achieves optimal ventilation and oxygenation  6/18/2025 2023 by Yonathan Sahu RN  Outcome: Progressing  6/18/2025 1235 by Paulette Young  Outcome: Progressing     Problem: Cardiovascular - Adult  Goal: Maintains optimal cardiac output and hemodynamic stability  6/18/2025 2023 by Yonathan Sahu RN  Outcome: Progressing  6/18/2025 1235 by Paulette Young  Outcome: Progressing  Goal: Absence of cardiac dysrhythmias or at baseline  6/18/2025 2023 by Yonathan Sahu RN  Outcome: Progressing  6/18/2025 1235 by Paulette Young  Outcome: Progressing     Problem: Skin/Tissue Integrity - Adult  Goal: Skin integrity remains intact  Description: 1.  Monitor for areas of redness and/or skin breakdown  2.  Assess vascular access sites hourly  3.  Every 4-6 hours minimum:  Change oxygen saturation probe site  4.  Every 4-6 hours:  If on nasal continuous positive airway pressure, respiratory therapy assess nares and determine need for appliance change or resting period  6/18/2025 2023 by Yonathan Sahu RN  Outcome: Progressing  6/18/2025 1235 by Paulette Young  Outcome: Progressing     Problem: Musculoskeletal - Adult  Goal: Return mobility to safest level of function  6/18/2025 2023 by Yonathan Sahu RN  Outcome: Progressing  6/18/2025 1235 by Paulette Young  Outcome: Progressing     Problem:

## 2025-06-20 ENCOUNTER — CARE COORDINATION (OUTPATIENT)
Dept: CARE COORDINATION | Age: 70
End: 2025-06-20

## 2025-06-20 LAB
EKG ATRIAL RATE: 75 BPM
EKG P AXIS: 51 DEGREES
EKG P-R INTERVAL: 184 MS
EKG Q-T INTERVAL: 396 MS
EKG QRS DURATION: 96 MS
EKG QTC CALCULATION (BAZETT): 442 MS
EKG R AXIS: -8 DEGREES
EKG T AXIS: 3 DEGREES
EKG VENTRICULAR RATE: 75 BPM

## 2025-06-20 NOTE — CARE COORDINATION
Care Transitions Note    Initial Call - Call within 2 business days of discharge: Yes    Patient Current Location:  Home: [address] [address]    Care Transition Nurse contacted the patient by telephone to perform post hospital discharge assessment, verified name and  as identifiers.  Provided introduction to self, and explanation of the Care Transition Nurse role.    Patient: Bandar Zapien    Patient : 1955   MRN: [id number]    Reason for Admission: NSTEMI, post PCTA  Discharge Date: 25  RURS: Readmission Risk Score: 7.3      Last Discharge Facility       Date Complaint Diagnosis Description Type Department Provider    25 Leg Swelling; Chest Pain Post PTCA ... ED to Hosp-Admission (Discharged) (ADMITTED) Hailey Hurley MD; Dangelo Bills,...            Was this an external facility discharge? No    Additional needs identified to be addressed with provider   No needs identified             Method of communication with provider: none.    Patients top risk factors for readmission: medical condition-NSTEMI, PTCA    Interventions to address risk factors:   Education: post cath  Review of patient management of conditions/medications: discharge    Care Summary Note: Patient reached for ELINA, home to self care after inpatient admit for NSTEMI, PTCA. He notes earlier home BP 90/44, later reading 144/80.Confirms AVS, [facility]  [facility], discussed need for cardiology follow up. He states [facility], he plans to speak with provider and inquire if he is able to schedule with [facility] for follow up. States has all other regular medications however unable verify individually, advised to take all current medications to all MD appts. [facility] radial cath site is scabbed over, no concerns. Reviewed per AVS:   anytime you think you may need emergency care. For example, call if:  Call 911 if:  You

## 2025-06-24 ENCOUNTER — CARE COORDINATION (OUTPATIENT)
Dept: CARE COORDINATION | Age: 70
End: 2025-06-24
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
## 2025-07-17 ENCOUNTER — TELEPHONE (OUTPATIENT)
Dept: FAMILY MEDICINE CLINIC | Age: 70
End: 2025-07-17

## 2025-07-19 ENCOUNTER — HOSPITAL ENCOUNTER (EMERGENCY)
Age: 70
Discharge: HOME | End: 2025-07-19
Attending: EMERGENCY MEDICINE
Payer: OTHER GOVERNMENT

## 2025-07-19 ENCOUNTER — APPOINTMENT (OUTPATIENT)
Dept: GENERAL RADIOLOGY | Age: 70
End: 2025-07-19
Payer: OTHER GOVERNMENT

## 2025-07-19 VITALS
TEMPERATURE: 97.5 F | RESPIRATION RATE: 11 BRPM | HEIGHT: 71 IN | HEART RATE: 67 BPM | DIASTOLIC BLOOD PRESSURE: 79 MMHG | SYSTOLIC BLOOD PRESSURE: 149 MMHG | OXYGEN SATURATION: 94 % | BODY MASS INDEX: 39.34 KG/M2 | WEIGHT: 281 LBS

## 2025-07-19 DIAGNOSIS — R22.43 LOCALIZED SWELLING OF BOTH LOWER LEGS: ICD-10-CM

## 2025-07-19 DIAGNOSIS — N30.00 ACUTE CYSTITIS WITHOUT HEMATURIA: Primary | ICD-10-CM

## 2025-07-19 DIAGNOSIS — R07.89 CHEST TIGHTNESS: ICD-10-CM

## 2025-07-19 LAB
ALBUMIN SERPL-MCNC: 4 G/DL (ref 3.5–5.2)
ALBUMIN/GLOB SERPL: 1.4 {RATIO} (ref 1–2.5)
ALP SERPL-CCNC: 91 U/L (ref 40–129)
ALT SERPL-CCNC: 32 U/L (ref 10–50)
ANION GAP SERPL CALCULATED.3IONS-SCNC: 13 MMOL/L (ref 9–16)
AST SERPL-CCNC: 21 U/L (ref 10–50)
BASOPHILS # BLD: 0.06 K/UL (ref 0–0.2)
BASOPHILS NFR BLD: 1 % (ref 0–2)
BILIRUB SERPL-MCNC: 0.7 MG/DL (ref 0–1.2)
BILIRUB UR QL STRIP: NEGATIVE
BNP SERPL-MCNC: 56 PG/ML (ref 0–125)
BUN SERPL-MCNC: 12 MG/DL (ref 8–23)
CALCIUM SERPL-MCNC: 9 MG/DL (ref 8.8–10.2)
CHLORIDE SERPL-SCNC: 109 MMOL/L (ref 98–107)
CLARITY UR: ABNORMAL
CO2 SERPL-SCNC: 24 MMOL/L (ref 20–31)
COLOR UR: YELLOW
CREAT SERPL-MCNC: 0.9 MG/DL (ref 0.7–1.2)
EKG Q-T INTERVAL: 382 MS
EKG QRS DURATION: 88 MS
EKG QTC CALCULATION (BAZETT): 418 MS
EKG R AXIS: 41 DEGREES
EKG T AXIS: -19 DEGREES
EKG VENTRICULAR RATE: 72 BPM
EOSINOPHIL # BLD: 0.21 K/UL (ref 0–0.44)
EOSINOPHILS RELATIVE PERCENT: 4 % (ref 1–4)
EPI CELLS #/AREA URNS HPF: ABNORMAL /HPF (ref 0–5)
ERYTHROCYTE [DISTWIDTH] IN BLOOD BY AUTOMATED COUNT: 12.3 % (ref 11.8–14.4)
GFR, ESTIMATED: 88 ML/MIN/1.73M2
GLUCOSE SERPL-MCNC: 109 MG/DL (ref 82–115)
GLUCOSE UR STRIP-MCNC: NEGATIVE MG/DL
HCT VFR BLD AUTO: 43.3 % (ref 40.7–50.3)
HGB BLD-MCNC: 14.9 G/DL (ref 13–17)
HGB UR QL STRIP.AUTO: NEGATIVE
IMM GRANULOCYTES # BLD AUTO: 0.01 K/UL (ref 0–0.3)
IMM GRANULOCYTES NFR BLD: 0 %
KETONES UR STRIP-MCNC: NEGATIVE MG/DL
LEUKOCYTE ESTERASE UR QL STRIP: ABNORMAL
LYMPHOCYTES NFR BLD: 1.31 K/UL (ref 1.1–3.7)
LYMPHOCYTES RELATIVE PERCENT: 22 % (ref 24–43)
MAGNESIUM SERPL-MCNC: 2.1 MG/DL (ref 1.6–2.4)
MCH RBC QN AUTO: 30.8 PG (ref 25.2–33.5)
MCHC RBC AUTO-ENTMCNC: 34.4 G/DL (ref 28.4–34.8)
MCV RBC AUTO: 89.5 FL (ref 82.6–102.9)
MONOCYTES NFR BLD: 0.52 K/UL (ref 0.1–1.2)
MONOCYTES NFR BLD: 9 % (ref 3–12)
NEUTROPHILS NFR BLD: 64 % (ref 36–65)
NEUTS SEG NFR BLD: 3.85 K/UL (ref 1.5–8.1)
NITRITE UR QL STRIP: NEGATIVE
NRBC BLD-RTO: 0 PER 100 WBC
PH UR STRIP: 6.5 [PH] (ref 5–8)
PLATELET # BLD AUTO: 171 K/UL (ref 138–453)
PMV BLD AUTO: 10.1 FL (ref 8.1–13.5)
POTASSIUM SERPL-SCNC: 3.9 MMOL/L (ref 3.7–5.3)
PROT SERPL-MCNC: 7 G/DL (ref 6.6–8.7)
PROT UR STRIP-MCNC: NEGATIVE MG/DL
RBC # BLD AUTO: 4.84 M/UL (ref 4.21–5.77)
RBC #/AREA URNS HPF: ABNORMAL /HPF (ref 0–2)
SODIUM SERPL-SCNC: 145 MMOL/L (ref 136–145)
SP GR UR STRIP: 1.01 (ref 1–1.03)
TROPONIN I SERPL HS-MCNC: 10 NG/L (ref 0–22)
TROPONIN I SERPL HS-MCNC: 8 NG/L (ref 0–22)
UROBILINOGEN UR STRIP-ACNC: NORMAL EU/DL (ref 0–1)
WBC #/AREA URNS HPF: ABNORMAL /HPF (ref 0–5)
WBC OTHER # BLD: 6 K/UL (ref 3.5–11.3)

## 2025-07-19 PROCEDURE — 93005 ELECTROCARDIOGRAM TRACING: CPT | Performed by: EMERGENCY MEDICINE

## 2025-07-19 PROCEDURE — 83735 ASSAY OF MAGNESIUM: CPT

## 2025-07-19 PROCEDURE — 71046 X-RAY EXAM CHEST 2 VIEWS: CPT

## 2025-07-19 PROCEDURE — 84484 ASSAY OF TROPONIN QUANT: CPT

## 2025-07-19 PROCEDURE — 87086 URINE CULTURE/COLONY COUNT: CPT

## 2025-07-19 PROCEDURE — 83880 ASSAY OF NATRIURETIC PEPTIDE: CPT

## 2025-07-19 PROCEDURE — 85025 COMPLETE CBC W/AUTO DIFF WBC: CPT

## 2025-07-19 PROCEDURE — 81001 URINALYSIS AUTO W/SCOPE: CPT

## 2025-07-19 PROCEDURE — 99285 EMERGENCY DEPT VISIT HI MDM: CPT

## 2025-07-19 PROCEDURE — 80053 COMPREHEN METABOLIC PANEL: CPT

## 2025-07-19 RX ORDER — CEPHALEXIN 500 MG/1
500 CAPSULE ORAL 2 TIMES DAILY
Qty: 14 CAPSULE | Refills: 0 | Status: SHIPPED | OUTPATIENT
Start: 2025-07-19 | End: 2025-07-26

## 2025-07-19 ASSESSMENT — ENCOUNTER SYMPTOMS
VOMITING: 0
COLOR CHANGE: 0
COUGH: 0
CONSTIPATION: 0
CHEST TIGHTNESS: 1
NAUSEA: 0
ABDOMINAL PAIN: 0
BLOOD IN STOOL: 0
DIARRHEA: 0
BACK PAIN: 0
SHORTNESS OF BREATH: 0
STRIDOR: 0
WHEEZING: 0

## 2025-07-19 ASSESSMENT — PAIN - FUNCTIONAL ASSESSMENT: PAIN_FUNCTIONAL_ASSESSMENT: 0-10

## 2025-07-19 ASSESSMENT — PAIN SCALES - GENERAL
PAINLEVEL_OUTOF10: 0
PAINLEVEL_OUTOF10: 8

## 2025-07-19 ASSESSMENT — PAIN DESCRIPTION - LOCATION: LOCATION: FLANK

## 2025-07-19 ASSESSMENT — HEART SCORE: ECG: NON-SPECIFC REPOLARIZATION DISTURBANCE/LBTB/PM

## 2025-07-19 NOTE — DISCHARGE INSTRUCTIONS
Follow-up with cardiology for further evaluation and management of lower extremity swelling and chest tightness.    Take Keflex twice daily for treatment of UTI.    Recommended patient follow up with PCP for further evaluation and management. Return to ED if patient develops chest pain, shortness of breath, palpitations fever, chills, hematuria.

## 2025-07-19 NOTE — ED NOTES
Patient states he is dizzy and that he fell twice today trying to get into the shower because of the dizziness.

## 2025-07-19 NOTE — ED PROVIDER NOTES
[facility] EMERGENCY DEPARTMENT  eMERGENCY dEPARTMENT eNCOUnter      Pt Name: Bandar Zapien  MRN: [id number]  Birthdate 1955  Date of evaluation: 7/19/2025  Provider: Pascale Lopez PA-C    CHIEF COMPLAINT       Chief Complaint   Patient presents with    Leg Swelling     Legs feel like they're 300 lbs, fell twice today    Dysuria     Reports burning with urination    Shortness of Breath     Concerned R flank pain causing SOB also reports sharp pain under shoulder blade         HISTORY OF PRESENT ILLNESS  (Location/Symptom, Timing/Onset, Context/Setting, Quality, Duration, Modifying Factors, Severity.)   Bandar Zapien is a 69 y.o. male with a PMH of hypertension, hyperlipidemia, CAD, kidney stones, NSTEMI who presents to the emergency department with bilateral leg swelling and chest tightness.  Patient states that he fell twice at home this morning due to to his legs feeling \"heavy.\"  He did not hit his head and does not take any blood thinners.  He has noticed bilateral leg swelling over the last couple weeks.  He reports having an NSTEMI 3 months ago with cardiac stent placement.  Today he denies shortness of breath or palpitations.  He was also recently seen for right sided kidney stones.  He reports dysuria starting 3 days ago.  He denies fever, chills, nausea, vomiting, hematuria, urinary urgency or frequency.    Nursing Notes were reviewed.    ALLERGIES     Latex, Amitiza [lubiprostone], Armoracia rusticana ext (horseradish), Coconut (cocos nucifera), Esomeprazole magnesium, and Pcn [penicillins]    CURRENT MEDICATIONS       Previous Medications    AMLODIPINE (NORVASC) 10 MG TABLET    Take 1 tablet by mouth daily    ASPIRIN 81 MG CHEWABLE TABLET    Take 1 tablet by mouth daily    ATORVASTATIN (LIPITOR) 80 MG TABLET    Take 1 tablet by mouth nightly    CLOBETASOL (TEMOVATE) 0.05 % EXTERNAL SOLUTION    Apply to affected regions of scalp twice daily, as needed, for itching.  Do NOT use  reevaluation patient reports improvement of his symptoms.  At this time patient is stable and appropriate for discharge.  Strict return precautions were discussed.  Patient voiced understanding.  I recommended patient follow-up with cardiology and primary care for further evaluation.    The patient was involved in his/her plan of care through shared decision making. The testing that was ordered was discussed with the patient. Any medications that may have been ordered were discussed with the patient. I have reviewed the patient's previous medical records using the electronic health record that we have available that were pertinent to today's visit.      Recommended patient follow up with PCP for further evaluation and management. Return to ED if patient develops chest pain, shortness of breath, palpitations fever, chills, hematuria.  Evaluation and treatment course in the ED, and plan of care upon discharge was discussed in length with the patient. Patient had no further questions prior to being discharged and was instructed to return to the ED for new or worsening symptoms.              CONSULTS:  None    SEPSIS  Is this patient to be included in the SEP-1 core measure due to severe sepsis or septic shock? No Exclusion criteria - the patient is NOT to be included for SEP-1 Core Measure due to: Infection is not suspected    FINAL IMPRESSION      1. Acute cystitis without hematuria    2. Localized swelling of both lower legs    3. Chest tightness            Problem List  Patient Active Problem List   Diagnosis Code    Angio-edema T78.3XXA    Hyperlipidemia E78.5    Primary hypertension I10    PSA elevation R97.20    Irritable bowel syndrome with constipation K58.1    Urinary tract infection in male N39.0    Hydronephrosis with renal and ureteral calculus obstruction N13.2    Enteritis K52.9    GI bleed K92.2    NSTEMI (non-ST elevated myocardial infarction) ([facility]) I21.4    Bilateral lower extremity edema R60.0

## 2025-07-20 LAB
MICROORGANISM SPEC CULT: NORMAL
SERVICE CMNT-IMP: NORMAL
SPECIMEN DESCRIPTION: NORMAL

## 2025-07-21 LAB
EKG Q-T INTERVAL: 382 MS
EKG QRS DURATION: 88 MS
EKG QTC CALCULATION (BAZETT): 418 MS
EKG R AXIS: 41 DEGREES
EKG T AXIS: -19 DEGREES
EKG VENTRICULAR RATE: 72 BPM

## 2025-07-21 PROCEDURE — 93010 ELECTROCARDIOGRAM REPORT: CPT | Performed by: INTERNAL MEDICINE

## (undated) DEVICE — STAZ CYSTO: Brand: MEDLINE INDUSTRIES, INC.

## (undated) DEVICE — STRIP SKIN CLSR W1XL5IN NYLON REINF CURAD STERIL

## (undated) DEVICE — MARKER,SKIN,WI/RULER AND LABELS: Brand: MEDLINE

## (undated) DEVICE — RADIFOCUS GLIDEWIRE: Brand: GLIDEWIRE

## (undated) DEVICE — BAND COMPR L29CM XLN CLR PLAS HEMSTAT EXT HK AND LOOP RETEN

## (undated) DEVICE — CATHETER DIAG AD 6FR L100CM 0.038IN STD COR POLYUR JUDKINS

## (undated) DEVICE — GLOVE ORTHO 7 1/2   MSG9475

## (undated) DEVICE — SINGLE ACTION PUMPING SYSTEM

## (undated) DEVICE — SYRINGE MED 20ML STD CLR PLAS LUERLOCK TIP N CTRL DISP

## (undated) DEVICE — CATHETER DIAG SM AD 6FR L100CM 0.038IN COR POLYUR JUDKINS L

## (undated) DEVICE — SYRINGE,TOOMEY,IRRIGATION,70CC,STERILE: Brand: MEDLINE

## (undated) DEVICE — CATHETER URET 8FR L65CM PLAS OPN CONE TIP RADPQ DISP

## (undated) DEVICE — DUAL LUMEN URETERAL CATHETER

## (undated) DEVICE — CHECK-FLO ADAPTER: Brand: CHECK-FLO

## (undated) DEVICE — GLOVE SURG SZ 7 CRM LTX FREE POLYISOPRENE POLYMER BEAD ANTI

## (undated) DEVICE — MONOPTY® DISPOSABLE CORE BIOPSY INSTRUMENT, 22MM PENETRATION DEPTH, 18G X 20CM: Brand: MONOPTY

## (undated) DEVICE — URETERAL STENT
Type: IMPLANTABLE DEVICE | Site: URETHRA | Status: NON-FUNCTIONAL
Brand: POLARIS™ ULTRA
Removed: 2025-03-12

## (undated) DEVICE — SINGLE-USE DIGITAL FLEXIBLE URETEROSCOPE: Brand: LITHOVUE

## (undated) DEVICE — FIBER ENT HOLM LSR 200 MIC STRL LTX FRE

## (undated) DEVICE — SOLUTION IRRIG 3000ML STRL H2O USP UROMATIC PLAS CONT

## (undated) DEVICE — HI-TORQUE WHISPER MS GUIDE WIRE .014 J TIP 3.0 CM X 190 CM: Brand: HI-TORQUE WHISPER

## (undated) DEVICE — CHECK-FLO HEMOSTASIS ASSEMBLY: Brand: CHECK-FLO

## (undated) DEVICE — STRAP,CATHETER,ELASTIC,HOOK&LOOP: Brand: MEDLINE

## (undated) DEVICE — GLIDESHEATH SLENDER STAINLESS STEEL KIT: Brand: GLIDESHEATH SLENDER

## (undated) DEVICE — CATHETER,FOLEY,3-WAY,22FR,30ML,100%SILI: Brand: MEDLINE

## (undated) DEVICE — MASTISOL ADHESIVE LIQ 2/3ML

## (undated) DEVICE — INFLATION KIT CUST REV

## (undated) DEVICE — STRIP,CLOSURE,WOUND,MEDI-STRIP,1/2X4: Brand: MEDLINE

## (undated) DEVICE — CATHETER GUIDE AD 6FR L100CM COR PERIPH GRN NYL PTFE XB L 3

## (undated) DEVICE — FIBER LSR HOLM 1000 DISP

## (undated) DEVICE — CATHETER,FOLEY,SILI-ELAST,LTX,22FR,10ML: Brand: MEDLINE

## (undated) DEVICE — GUIDEWIRE WITH ICE™ HYDROPHILIC COATING: Brand: LUGE™

## (undated) DEVICE — CONE TIP URETERAL CATHETER: Brand: URETERAL CATHETER

## (undated) DEVICE — WHISTLE TIP URETERAL CATHETER (RIGHT): Brand: COOK

## (undated) DEVICE — CONE TIP URETERAL CATHETER WITH OPEN-END: Brand: CONE TIP

## (undated) DEVICE — DRAINBAG,ANTI-REFLUX TOWER,L/F,2000ML,LL: Brand: MEDLINE

## (undated) DEVICE — NEEDLE BX ASPIR SPNL TIPCM MRK AND NDL STP 22GAX20CM

## (undated) DEVICE — SYRINGE MED 30ML STD CLR PLAS LUERLOCK TIP N CTRL DISP

## (undated) DEVICE — MAX-CORE® DISPOSABLE CORE BIOPSY INSTRUMENT, 18G X 25CM: Brand: MAX-CORE

## (undated) DEVICE — CATH BLLN ANGIO 2.50X12MM SC EUPHORA RX

## (undated) DEVICE — SOL IRR SOD CHL 0.9% TITAN XL CNTNR 3000ML

## (undated) DEVICE — CONTAINER,SPECIMEN,OR STERILE,4OZ: Brand: MEDLINE

## (undated) DEVICE — BAND COMPR L24CM REG CLR PLAS HEMSTAT EXT HK AND LOOP RETEN